# Patient Record
Sex: FEMALE | Race: WHITE | Employment: OTHER | URBAN - METROPOLITAN AREA
[De-identification: names, ages, dates, MRNs, and addresses within clinical notes are randomized per-mention and may not be internally consistent; named-entity substitution may affect disease eponyms.]

---

## 2017-03-25 ENCOUNTER — HOSPITAL ENCOUNTER (EMERGENCY)
Age: 77
Discharge: HOME | End: 2017-03-25
Attending: EMERGENCY MEDICINE
Payer: MEDICARE

## 2017-03-25 VITALS
HEART RATE: 101 BPM | RESPIRATION RATE: 19 BRPM | OXYGEN SATURATION: 95 % | DIASTOLIC BLOOD PRESSURE: 53 MMHG | TEMPERATURE: 98.1 F | HEIGHT: 57 IN | BODY MASS INDEX: 38.43 KG/M2 | WEIGHT: 178.13 LBS | SYSTOLIC BLOOD PRESSURE: 118 MMHG

## 2017-03-25 DIAGNOSIS — R53.83 FATIGUE, UNSPECIFIED TYPE: ICD-10-CM

## 2017-03-25 DIAGNOSIS — K64.8 INTERNAL HEMORRHOID: Primary | ICD-10-CM

## 2017-03-25 LAB
ABO + RH BLD: NORMAL
ALBUMIN SERPL BCP-MCNC: 3.5 G/DL (ref 3.5–5)
ALBUMIN/GLOB SERPL: 0.9 {RATIO} (ref 1.1–2.2)
ALP SERPL-CCNC: 93 U/L (ref 45–117)
ALT SERPL-CCNC: 51 U/L (ref 12–78)
ANION GAP BLD CALC-SCNC: 10 MMOL/L (ref 5–15)
ANTIGENS PRESENT BLD: NORMAL
APTT PPP: 22.8 SEC (ref 22.1–32.5)
AST SERPL W P-5'-P-CCNC: 37 U/L (ref 15–37)
BASOPHILS # BLD AUTO: 0.1 K/UL (ref 0–0.1)
BASOPHILS # BLD: 1 % (ref 0–1)
BILIRUB SERPL-MCNC: 0.2 MG/DL (ref 0.2–1)
BLOOD BANK CMNT PATIENT-IMP: NORMAL
BLOOD GROUP ANTIBODIES SERPL: NORMAL
BLOOD GROUP ANTIBODIES SERPL: NORMAL
BUN SERPL-MCNC: 20 MG/DL (ref 6–20)
BUN/CREAT SERPL: 24 (ref 12–20)
CALCIUM SERPL-MCNC: 9.6 MG/DL (ref 8.5–10.1)
CHLORIDE SERPL-SCNC: 107 MMOL/L (ref 97–108)
CO2 SERPL-SCNC: 26 MMOL/L (ref 21–32)
CREAT SERPL-MCNC: 0.85 MG/DL (ref 0.55–1.02)
EOSINOPHIL # BLD: 0.2 K/UL (ref 0–0.4)
EOSINOPHIL NFR BLD: 2 % (ref 0–7)
ERYTHROCYTE [DISTWIDTH] IN BLOOD BY AUTOMATED COUNT: 14.3 % (ref 11.5–14.5)
GLOBULIN SER CALC-MCNC: 3.7 G/DL (ref 2–4)
GLUCOSE SERPL-MCNC: 131 MG/DL (ref 65–100)
HCT VFR BLD AUTO: 36.7 % (ref 35–47)
HEMOCCULT STL QL: NEGATIVE
HGB BLD-MCNC: 11.6 G/DL (ref 11.5–16)
INR PPP: 1 (ref 0.9–1.1)
LYMPHOCYTES # BLD AUTO: 24 % (ref 12–49)
LYMPHOCYTES # BLD: 2.6 K/UL (ref 0.8–3.5)
MCH RBC QN AUTO: 28.9 PG (ref 26–34)
MCHC RBC AUTO-ENTMCNC: 31.6 G/DL (ref 30–36.5)
MCV RBC AUTO: 91.5 FL (ref 80–99)
MONOCYTES # BLD: 0.9 K/UL (ref 0–1)
MONOCYTES NFR BLD AUTO: 9 % (ref 5–13)
NEUTS SEG # BLD: 6.9 K/UL (ref 1.8–8)
NEUTS SEG NFR BLD AUTO: 64 % (ref 32–75)
PLATELET # BLD AUTO: 312 K/UL (ref 150–400)
POTASSIUM SERPL-SCNC: 4.4 MMOL/L (ref 3.5–5.1)
PROT SERPL-MCNC: 7.2 G/DL (ref 6.4–8.2)
PROTHROMBIN TIME: 10 SEC (ref 9–11.1)
RBC # BLD AUTO: 4.01 M/UL (ref 3.8–5.2)
SODIUM SERPL-SCNC: 143 MMOL/L (ref 136–145)
SPECIMEN EXP DATE BLD: NORMAL
THERAPEUTIC RANGE,PTTT: NORMAL SECS (ref 58–77)
WBC # BLD AUTO: 10.6 K/UL (ref 3.6–11)

## 2017-03-25 PROCEDURE — 82272 OCCULT BLD FECES 1-3 TESTS: CPT | Performed by: EMERGENCY MEDICINE

## 2017-03-25 PROCEDURE — 99284 EMERGENCY DEPT VISIT MOD MDM: CPT

## 2017-03-25 PROCEDURE — 80053 COMPREHEN METABOLIC PANEL: CPT | Performed by: PHYSICIAN ASSISTANT

## 2017-03-25 PROCEDURE — 85730 THROMBOPLASTIN TIME PARTIAL: CPT | Performed by: PHYSICIAN ASSISTANT

## 2017-03-25 PROCEDURE — 86900 BLOOD TYPING SEROLOGIC ABO: CPT | Performed by: PHYSICIAN ASSISTANT

## 2017-03-25 PROCEDURE — 36415 COLL VENOUS BLD VENIPUNCTURE: CPT | Performed by: PHYSICIAN ASSISTANT

## 2017-03-25 PROCEDURE — 85025 COMPLETE CBC W/AUTO DIFF WBC: CPT | Performed by: PHYSICIAN ASSISTANT

## 2017-03-25 PROCEDURE — 85610 PROTHROMBIN TIME: CPT | Performed by: PHYSICIAN ASSISTANT

## 2017-03-25 PROCEDURE — 86905 BLOOD TYPING RBC ANTIGENS: CPT | Performed by: PHYSICIAN ASSISTANT

## 2017-03-25 PROCEDURE — 86870 RBC ANTIBODY IDENTIFICATION: CPT | Performed by: PHYSICIAN ASSISTANT

## 2017-03-25 NOTE — DISCHARGE INSTRUCTIONS
We hope that we have addressed all of your medical concerns. The examination and treatment you received in the Emergency Department were for an emergent problem and were not intended as complete care. It is important that you follow up with your healthcare provider(s) for ongoing care. If your symptoms worsen or do not improve as expected, and you are unable to reach your usual health care provider(s), you should return to the Emergency Department. Today's healthcare is undergoing tremendous change, and patient satisfaction surveys are one of the many tools to assess the quality of medical care. You may receive a survey from the [employer] regarding your experience in the Emergency Department. I hope that your experience has been completely positive, particularly the medical care that I provided. As such, please participate in the survey; anything less than excellent does not meet my expectations or intentions. [facility] and [address] [place] [address] participate in nationally recognized quality of care measures. If your blood pressure is greater than 120/80, as reported below, we urge that you seek medical care to address the potential of high blood pressure, commonly known as hypertension. Hypertension can be hereditary or can be caused by certain medical conditions, pain, stress, or \"white coat syndrome. \"       Please make an appointment with your health care provider(s) for follow up of your Emergency Department visit. VITALS:   Patient Vitals for the past 8 hrs:   Temp Pulse Resp BP SpO2   03/25/17 1839 - - - 118/53 95 %   03/25/17 1810 98.1 °F (36.7 °C) (!) 101 19 136/65 95 %          Thank you for allowing us to provide you with medical care today. We realize that you have many choices for your emergency care needs. Please choose us in the future for any continued health care needs. Inna Roman, POOJA Mastersyecenia [facility]: [phone]            Recent Results (from the past 24 hour(s))   OCCULT BLOOD, STOOL    Collection Time: 03/25/17  6:46 PM   Result Value Ref Range    Occult blood, stool NEGATIVE  NEG     CBC WITH AUTOMATED DIFF    Collection Time: 03/25/17  6:59 PM   Result Value Ref Range    WBC 10.6 3.6 - 11.0 K/uL    RBC 4.01 3.80 - 5.20 M/uL    HGB 11.6 11.5 - 16.0 g/dL    HCT 36.7 35.0 - 47.0 %    MCV 91.5 80.0 - 99.0 FL    MCH 28.9 26.0 - 34.0 PG    MCHC 31.6 30.0 - 36.5 g/dL    RDW 14.3 11.5 - 14.5 %    PLATELET 885 358 - 678 K/uL    NEUTROPHILS 64 32 - 75 %    LYMPHOCYTES 24 12 - 49 %    MONOCYTES 9 5 - 13 %    EOSINOPHILS 2 0 - 7 %    BASOPHILS 1 0 - 1 %    ABS. NEUTROPHILS 6.9 1.8 - 8.0 K/UL    ABS. LYMPHOCYTES 2.6 0.8 - 3.5 K/UL    ABS. MONOCYTES 0.9 0.0 - 1.0 K/UL    ABS. EOSINOPHILS 0.2 0.0 - 0.4 K/UL    ABS. BASOPHILS 0.1 0.0 - 0.1 K/UL       No results found. Hemorrhoids: Care Instructions  Your Care Instructions    Hemorrhoids are enlarged veins that develop in the anal canal. Bleeding during bowel movements, itching, swelling, and rectal pain are the most common symptoms. They can be uncomfortable at times, but hemorrhoids rarely are a serious problem. You can treat most hemorrhoids with simple changes to your diet and bowel habits. These changes include eating more fiber and not straining to pass stools. Most hemorrhoids do not need surgery or other treatment unless they are very large and painful or bleed a lot. Follow-up care is a key part of your treatment and safety. Be sure to make and go to all appointments, and call your doctor if you are having problems. Its also a good idea to know your test results and keep a list of the medicines you take. How can you care for yourself at home? · Sit in a few inches of warm water (sitz bath) 3 times a day and after bowel movements. The warm water helps with pain and itching.   · Put ice on your anal area several times a day for 10 minutes at a time. Put a thin cloth between the ice and your skin. Follow this by placing a warm, wet towel on the area for another 10 to 20 minutes. · Take pain medicines exactly as directed. ¨ If the doctor gave you a prescription medicine for pain, take it as prescribed. ¨ If you are not taking a prescription pain medicine, ask your doctor if you can take an over-the-counter medicine. · Keep the anal area clean, but be gentle. Use water and a fragrance-free soap, such as [place], or use baby wipes or medicated pads, such as Tucks. · Wear cotton underwear and loose clothing to decrease moisture in the anal area. · Eat more fiber. Include foods such as whole-grain breads and cereals, raw vegetables, raw and dried fruits, and beans. · Drink plenty of fluids, enough so that your urine is light yellow or clear like water. If you have kidney, heart, or liver disease and have to limit fluids, talk with your doctor before you increase the amount of fluids you drink. · Use a stool softener that contains bran or psyllium. You can save money by buying bran or psyllium (available in bulk at most health food stores) and sprinkling it on foods or stirring it into fruit juice. Or you can use a product such as Metamucil or Hydrocil. · Practice healthy bowel habits. ¨ Go to the bathroom as soon as you have the urge. ¨ Avoid straining to pass stools. Relax and give yourself time to let things happen naturally. ¨ Do not hold your breath while passing stools. ¨ Do not read while sitting on the toilet. Get off the toilet as soon as you have finished. · Take your medicines exactly as prescribed. Call your doctor if you think you are having a problem with your medicine. When should you call for help? Call 911 anytime you think you may need emergency care. For example, call if:  · You pass maroon or very bloody stools.   Call your doctor now or seek immediate medical care if:  · You have increased pain. · You have increased bleeding. Watch closely for changes in your health, and be sure to contact your doctor if:  · Your symptoms have not improved after 3 or 4 days. Where can you learn more? Go to http://anabell-blake.info/. Enter F228 in the search box to learn more about \"Hemorrhoids: Care Instructions. \"  Current as of: August 9, 2016  Content Version: 11.1  © 0855-1556 [employer]. Care instructions adapted under license by [employer] (which disclaims liability or warranty for this information). If you have questions about a medical condition or this instruction, always ask your healthcare professional. [address] any warranty or liability for your use of this information. Fatigue: Care Instructions  Your Care Instructions  Fatigue is a feeling of tiredness, exhaustion, or lack of energy. You may feel fatigue because of too much or not enough activity. It can also come from stress, lack of sleep, boredom, and poor diet. Many medical problems, such as viral infections, can cause fatigue. Emotional problems, especially depression, are often the cause of fatigue. Fatigue is most often a symptom of another problem. Treatment for fatigue depends on the cause. For example, if you have fatigue because you have a certain health problem, treating this problem also treats your fatigue. If depression or anxiety is the cause, treatment may help. Follow-up care is a key part of your treatment and safety. Be sure to make and go to all appointments, and call your doctor if you are having problems. It's also a good idea to know your test results and keep a list of the medicines you take. How can you care for yourself at home? · Get regular exercise. But don't overdo it. Go back and forth between rest and exercise. · Get plenty of rest.  · Eat a healthy diet.  Do not skip meals, especially breakfast.  · Reduce your use of caffeine, tobacco, and alcohol. Caffeine is most often found in coffee, tea, cola drinks, and chocolate. · Limit medicines that can cause fatigue. This includes tranquilizers and cold and allergy medicines. When should you call for help? Watch closely for changes in your health, and be sure to contact your doctor if:  · You have new symptoms such as fever or a rash. · Your fatigue gets worse. · You have been feeling down, depressed, or hopeless. Or you may have lost interest in things that you usually enjoy. · You are not getting better as expected. Where can you learn more? Go to http://anabell-blake.info/. Enter R752 in the search box to learn more about \"Fatigue: Care Instructions. \"  Current as of: May 27, 2016  Content Version: 11.1  © 2429-4182 [employer]. Care instructions adapted under license by [employer] (which disclaims liability or warranty for this information). If you have questions about a medical condition or this instruction, always ask your healthcare professional. [place] [address] any warranty or liability for your use of this information.

## 2017-03-25 NOTE — ED NOTES
Bedside and Verbal shift change report given to april (oncoming nurse) by Ameena Kraft (offgoing nurse). Report included the following information SBAR.

## 2017-03-25 NOTE — ED PROVIDER NOTES
HPI Comments: Estrada Williamson is a 77 y.o. female with hx significant for internal hemorrhoids who presents ambulatory to ER with c/o general fatigue/malaise and rectal bleeding x one week. Pt states she has been having intermittent rectal bleeding x admitted in August for bleeding internal hemorrhoids and anemia/need for blood transfusion. States followed up with GI and \"they keep saying to use this cream which I am but it still bleeds\". States increased rectal bleeding x one week and states since increased bleeding started also having generalized fatigue and malaise johana thas been progressivel worsening. No SOB, syncope, abd pain, CP, and any other complaints. States \"I feel like I need a transfusion\". No other complaints. She specifically denies any fevers, chills, nausea, vomiting, chest pain, shortness of breath, headache, rash, diarrhea, abdominal pain, urinary changes, sweating or weight loss. PCP: Sascha Estes MD   PMHx significant for: Past Medical History:  No date: Hypercholesterolemia  2016: Hyperglycemia due to type 2 diabetes mellitus *  No date: Ill-defined condition      Comment: cellulitis  No date: Major depression      Comment: depression  No date: Pneumonia      Comment:  or so  No date: Transient ischemic attack    PSHx significant for: Past Surgical History:  2016: COLONOSCOPY      Comment:    2016: COLONOSCOPY N/A      Comment: COLONOSCOPY performed by Nicky Garcia MD                at [address] [place] [address] 10  2016: FLEXIBLE SIGMOIDOSCOPY N/A      Comment: SIGMOIDOSCOPY FLEXIBLE performed by Bradley Cisneros MD at [address] [place] [address] 10  No date: HX APPENDECTOMY  2016: UPPER GI ENDOSCOPY,DIAGNOSIS      Comment:       No Known Allergies    There are no other complaints, changes or physical findings at this time. The history is provided by the patient and a relative.         Past Medical History:   Diagnosis Date    Hypercholesterolemia     Hyperglycemia due to type 2 diabetes mellitus ([facility] [address] 8/4/2016    Ill-defined condition     cellulitis    Major depression     depression    Pneumonia     2011 or so    Transient ischemic attack        Past Surgical History:   Procedure Laterality Date    COLONOSCOPY  6/24/2016         COLONOSCOPY N/A 6/24/2016    COLONOSCOPY performed by Xioamra Koroma MD at [address] N/A 8/26/2016    SIGMOIDOSCOPY FLEXIBLE performed by Xiomara Koroma MD at [facility] HX APPENDECTOMY      UPPER GI ENDOSCOPY,DIAGNOSIS  8/26/2016              History reviewed. No pertinent family history. Social History     Social History    Marital status:      Spouse name: N/A    Number of children: N/A    Years of education: N/A     Occupational History    Not on file. Social History Main Topics    Smoking status: Former Smoker    Smokeless tobacco: Not on file    Alcohol use No    Drug use: No    Sexual activity: Not on file     Other Topics Concern    Not on file     Social History Narrative         ALLERGIES: Review of patient's allergies indicates no known allergies. Review of Systems   Constitutional: Positive for fatigue. Negative for appetite change, chills and fever. HENT: Negative. Negative for congestion and sore throat. Eyes: Negative. Negative for visual disturbance. Respiratory: Negative. Negative for cough and shortness of breath. Cardiovascular: Negative. Negative for chest pain, palpitations and leg swelling. Gastrointestinal: Positive for anal bleeding. Negative for abdominal pain, constipation, diarrhea, nausea and vomiting. Genitourinary: Negative. Negative for dysuria, flank pain and hematuria. Musculoskeletal: Negative. Negative for back pain and neck pain. Skin: Negative. Negative for rash. Neurological: Negative. Negative for dizziness, syncope, weakness, numbness and headaches. Hematological: Negative. Psychiatric/Behavioral: Negative. All other systems reviewed and are negative. Vitals:    03/25/17 1810 03/25/17 1839   BP: 136/65 118/53   Pulse: (!) 101    Resp: 19    Temp: 98.1 °F (36.7 °C)    SpO2: 95% 95%   Weight: 80.8 kg (178 lb 2.1 oz)    Height: 4' 9\" (1.448 m)             Physical Exam   Constitutional: She is oriented to person, place, and time. She appears well-developed and well-nourished. No distress. HENT:   Head: Normocephalic and atraumatic. Mouth/Throat: Oropharynx is clear and moist.   Eyes: Conjunctivae and EOM are normal. Pupils are equal, round, and reactive to light. Neck: Normal range of motion. Neck supple. Cardiovascular: Normal rate, S1 normal, S2 normal, normal heart sounds, intact distal pulses and normal pulses. Exam reveals no gallop and no friction rub. No murmur heard. Pulses:       Dorsalis pedis pulses are 2+ on the right side, and 2+ on the left side. Pulmonary/Chest: Effort normal and breath sounds normal. No accessory muscle usage. No respiratory distress. She has no decreased breath sounds. She has no wheezes. She has no rhonchi. She has no rales. Abdominal: Soft. Normal appearance and bowel sounds are normal. She exhibits no distension. There is no hepatosplenomegaly. There is no tenderness. There is no rebound, no guarding and no CVA tenderness. Genitourinary: Rectal exam shows guaiac negative stool (light brown stool; guaiac negative, contorl appropriate). Musculoskeletal: Normal range of motion. She exhibits no edema or tenderness. Neurological: She is alert and oriented to person, place, and time. She has normal strength. No sensory deficit. Skin: Skin is warm and dry. No rash noted. She is not diaphoretic. No erythema. No pallor. Psychiatric: She has a normal mood and affect. Her behavior is normal.   Nursing note and vitals reviewed. MDM  Number of Diagnoses or Management Options  Fatigue, unspecified type:    Internal hemorrhoid:   Diagnosis management comments: DDx: rectal bleeding, GI bleed, anemia       Amount and/or Complexity of Data Reviewed  Clinical lab tests: ordered and reviewed  Obtain history from someone other than the patient: yes (Daughter )  Review and summarize past medical records: yes  Discuss the patient with other providers: yes (Dr. Clyde Bach)    Patient Progress  Patient progress: stable    ED Course       Procedures        Procedure Note - Rectal Exam:   6:49 PM  Performed by: Cleo Cruz PA-C   Chaperoned by: Gokul Benitez RN  Rectal exam performed. Light brown stool was collected. Stool was Hemoccult tested, and found to be heme Negative. The procedure took 1-15 minutes, and pt tolerated well. 6:49 PM   Cleo Cruz PA-C discussed patient with Dannielle Childress MD who is in agreement with care plan as outlined. Will be in to see the patient. No further recommendations. Cleo Cruz PA-C          7:30 PM  Pt has been reevaluated. There are no new complaints, changes, or physical findings at this time. Medications have been reviewed w/ pt and/or family. Pt and/or family's questions have been answered. Pt and/or family expressed good understanding of the dx/tx/rx and is in agreement with plan of care. Pt instructed and agreed to f/u w/ PCP, GI and to return to ED upon further deterioration. Pt is ready for discharge.     LABORATORY TESTS:  Recent Results (from the past 12 hour(s))   OCCULT BLOOD, STOOL    Collection Time: 03/25/17  6:46 PM   Result Value Ref Range    Occult blood, stool NEGATIVE  NEG     CBC WITH AUTOMATED DIFF    Collection Time: 03/25/17  6:59 PM   Result Value Ref Range    WBC 10.6 3.6 - 11.0 K/uL    RBC 4.01 3.80 - 5.20 M/uL    HGB 11.6 11.5 - 16.0 g/dL    HCT 36.7 35.0 - 47.0 %    MCV 91.5 80.0 - 99.0 FL    MCH 28.9 26.0 - 34.0 PG    MCHC 31.6 30.0 - 36.5 g/dL    RDW 14.3 11.5 - 14.5 %    PLATELET 789 387 - 503 K/uL    NEUTROPHILS 64 32 - 75 %    LYMPHOCYTES 24 12 - 49 %    MONOCYTES 9 5 - 13 %    EOSINOPHILS 2 0 - 7 %    BASOPHILS 1 0 - 1 %    ABS. NEUTROPHILS 6.9 1.8 - 8.0 K/UL    ABS. LYMPHOCYTES 2.6 0.8 - 3.5 K/UL    ABS. MONOCYTES 0.9 0.0 - 1.0 K/UL    ABS. EOSINOPHILS 0.2 0.0 - 0.4 K/UL    ABS. BASOPHILS 0.1 0.0 - 0.1 K/UL       IMAGING RESULTS:  No orders to display     No results found. MEDICATIONS GIVEN:  Medications - No data to display    IMPRESSION:  1. Internal hemorrhoid    2. Fatigue, unspecified type        PLAN:  1. Current Discharge Medication List      CONTINUE these medications which have NOT CHANGED    Details   hydrocortisone (ANUSOL-HC) 2.5 % rectal cream Insert  into rectum four (4) times daily. Qty: 30 g, Refills: 0      fluticasone-vilanterol (BREO ELLIPTA) 100-25 mcg/dose inhaler Take 1 Puff by inhalation daily. benzonatate (TESSALON) 100 mg capsule Take 100 mg by mouth two (2) times daily as needed for Cough. ferrous sulfate 325 mg (65 mg iron) tablet Take 1 Tab by mouth daily (with breakfast). Qty: 30 Tab, Refills: 1      omega-3 acid ethyl esters (LOVAZA) 1 gram capsule Take 4 g by mouth daily (with breakfast). aspirin delayed-release 81 mg tablet Take 81 mg by mouth daily. atorvastatin (LIPITOR) 40 mg tablet Take 40 mg by mouth daily. escitalopram oxalate (LEXAPRO) 20 mg tablet Take 20 mg by mouth daily. metFORMIN (GLUCOPHAGE) 500 mg tablet Take 500 mg by mouth two (2) times daily (with meals). losartan (COZAAR) 25 mg tablet Take 25 mg by mouth daily. cholecalciferol (VITAMIN D3) 400 unit tab tablet Take 400 Units by mouth two (2) times a day. calcium carbonate (OS-DEMARCUS) 500 mg calcium (1,250 mg) tablet Take 2 Tabs by mouth daily.            2.   Follow-up Information     Follow up With Details Comments Contact Info    Lilia Nova MD Schedule an appointment as soon as possible for a visit in 3 days  [address]      Chaparro Streeter MD Schedule an appointment as soon as possible for a visit in 3 days  [facility] [id number]  [phone]      [address] [facility] DEPT  If symptoms worsen [facility]  [phone]            Return to ED if worse

## 2017-11-27 ENCOUNTER — APPOINTMENT (OUTPATIENT)
Dept: GENERAL RADIOLOGY | Age: 77
DRG: 549 | End: 2017-11-27
Attending: EMERGENCY MEDICINE
Payer: MEDICARE

## 2017-11-27 ENCOUNTER — APPOINTMENT (OUTPATIENT)
Dept: CT IMAGING | Age: 77
DRG: 549 | End: 2017-11-27
Attending: EMERGENCY MEDICINE
Payer: MEDICARE

## 2017-11-27 ENCOUNTER — ANESTHESIA (OUTPATIENT)
Dept: SURGERY | Age: 77
DRG: 549 | End: 2017-11-27
Payer: MEDICARE

## 2017-11-27 ENCOUNTER — HOSPITAL ENCOUNTER (INPATIENT)
Age: 77
LOS: 4 days | Discharge: HOME HEALTH | DRG: 549 | End: 2017-12-01
Attending: EMERGENCY MEDICINE | Admitting: INTERNAL MEDICINE
Payer: MEDICARE

## 2017-11-27 ENCOUNTER — ANESTHESIA EVENT (OUTPATIENT)
Dept: SURGERY | Age: 77
DRG: 549 | End: 2017-11-27
Payer: MEDICARE

## 2017-11-27 DIAGNOSIS — A41.9 SEPSIS, DUE TO UNSPECIFIED ORGANISM: Primary | ICD-10-CM

## 2017-11-27 DIAGNOSIS — D72.9 NEUTROPHILIA: ICD-10-CM

## 2017-11-27 PROBLEM — E66.01 OBESITY, MORBID (HCC): Status: ACTIVE | Noted: 2017-11-27

## 2017-11-27 PROBLEM — E87.20 LACTIC ACIDOSIS: Status: ACTIVE | Noted: 2017-11-27

## 2017-11-27 LAB
ALBUMIN SERPL-MCNC: 3 G/DL (ref 3.5–5)
ALBUMIN/GLOB SERPL: 0.7 {RATIO} (ref 1.1–2.2)
ALP SERPL-CCNC: 103 U/L (ref 45–117)
ALT SERPL-CCNC: 39 U/L (ref 12–78)
ANION GAP SERPL CALC-SCNC: 11 MMOL/L (ref 5–15)
APPEARANCE FLD: ABNORMAL
APPEARANCE UR: CLEAR
AST SERPL-CCNC: 26 U/L (ref 15–37)
ATRIAL RATE: 97 BPM
BACTERIA URNS QL MICRO: NEGATIVE /HPF
BASOPHILS # BLD: 0 K/UL (ref 0–0.1)
BASOPHILS NFR BLD: 0 % (ref 0–1)
BILIRUB SERPL-MCNC: 0.5 MG/DL (ref 0.2–1)
BILIRUB UR QL: NEGATIVE
BNP SERPL-MCNC: 257 PG/ML (ref 0–450)
BODY FLD TYPE: NORMAL
BUN SERPL-MCNC: 13 MG/DL (ref 6–20)
BUN/CREAT SERPL: 15 (ref 12–20)
CALCIUM SERPL-MCNC: 9 MG/DL (ref 8.5–10.1)
CALCULATED R AXIS, ECG10: -158 DEGREES
CALCULATED T AXIS, ECG11: 134 DEGREES
CHLORIDE SERPL-SCNC: 101 MMOL/L (ref 97–108)
CO2 SERPL-SCNC: 26 MMOL/L (ref 21–32)
COLOR FLD: ABNORMAL
COLOR UR: ABNORMAL
CREAT SERPL-MCNC: 0.87 MG/DL (ref 0.55–1.02)
CRP SERPL-MCNC: 15.05 MG/DL
CRYSTALS FLD MICRO: NORMAL
DIAGNOSIS, 93000: NORMAL
DIFFERENTIAL METHOD BLD: ABNORMAL
EOSINOPHIL # BLD: 0 K/UL (ref 0–0.4)
EOSINOPHIL NFR BLD: 0 % (ref 0–7)
EPITH CASTS URNS QL MICRO: ABNORMAL /LPF
ERYTHROCYTE [DISTWIDTH] IN BLOOD BY AUTOMATED COUNT: 16.6 % (ref 11.5–14.5)
ERYTHROCYTE [SEDIMENTATION RATE] IN BLOOD: 68 MM/HR (ref 0–30)
EST. AVERAGE GLUCOSE BLD GHB EST-MCNC: 180 MG/DL
GLOBULIN SER CALC-MCNC: 4.4 G/DL (ref 2–4)
GLUCOSE BLD STRIP.AUTO-MCNC: 126 MG/DL (ref 65–100)
GLUCOSE BLD STRIP.AUTO-MCNC: 129 MG/DL (ref 65–100)
GLUCOSE BLD STRIP.AUTO-MCNC: 193 MG/DL (ref 65–100)
GLUCOSE BLD STRIP.AUTO-MCNC: 225 MG/DL (ref 65–100)
GLUCOSE SERPL-MCNC: 221 MG/DL (ref 65–100)
GLUCOSE UR STRIP.AUTO-MCNC: NEGATIVE MG/DL
GRAN CASTS URNS QL MICRO: ABNORMAL /LPF
HBA1C MFR BLD: 7.9 % (ref 4.2–6.3)
HCT VFR BLD AUTO: 34.6 % (ref 35–47)
HGB BLD-MCNC: 10.8 G/DL (ref 11.5–16)
HGB UR QL STRIP: NEGATIVE
KETONES UR QL STRIP.AUTO: NEGATIVE MG/DL
LACTATE SERPL-SCNC: 1.7 MMOL/L (ref 0.4–2)
LACTATE SERPL-SCNC: 2.7 MMOL/L (ref 0.4–2)
LEUKOCYTE ESTERASE UR QL STRIP.AUTO: NEGATIVE
LYMPHOCYTES # BLD: 1.6 K/UL (ref 0.8–3.5)
LYMPHOCYTES NFR BLD: 6 % (ref 12–49)
LYMPHOCYTES NFR FLD: 2 %
MCH RBC QN AUTO: 28.1 PG (ref 26–34)
MCHC RBC AUTO-ENTMCNC: 31.2 G/DL (ref 30–36.5)
MCV RBC AUTO: 90.1 FL (ref 80–99)
MONOCYTES # BLD: 1.9 K/UL (ref 0–1)
MONOCYTES NFR BLD: 7 % (ref 5–13)
MONOS+MACROS NFR FLD: 4 %
NEUTROPHILS NFR FLD: 94 %
NEUTS SEG # BLD: 23.4 K/UL (ref 1.8–8)
NEUTS SEG NFR BLD: 87 % (ref 32–75)
NITRITE UR QL STRIP.AUTO: NEGATIVE
NUC CELL # FLD: ABNORMAL /CU MM (ref 0–5)
P-R INTERVAL, ECG05: 166 MS
PERIPHERAL SMEAR,PSM: NORMAL
PH UR STRIP: 5.5 [PH] (ref 5–8)
PLATELET # BLD AUTO: 333 K/UL (ref 150–400)
POTASSIUM SERPL-SCNC: 3.9 MMOL/L (ref 3.5–5.1)
PROT SERPL-MCNC: 7.4 G/DL (ref 6.4–8.2)
PROT UR STRIP-MCNC: NEGATIVE MG/DL
Q-T INTERVAL, ECG07: 350 MS
QRS DURATION, ECG06: 76 MS
QTC CALCULATION (BEZET), ECG08: 444 MS
RBC # BLD AUTO: 3.84 M/UL (ref 3.8–5.2)
RBC # FLD: >100 /CU MM
RBC #/AREA URNS HPF: ABNORMAL /HPF (ref 0–5)
RBC MORPH BLD: ABNORMAL
SERVICE CMNT-IMP: ABNORMAL
SODIUM SERPL-SCNC: 138 MMOL/L (ref 136–145)
SP GR UR REFRACTOMETRY: 1.01 (ref 1–1.03)
SPECIMEN SOURCE FLD: ABNORMAL
TROPONIN I SERPL-MCNC: <0.04 NG/ML
UA: UC IF INDICATED,UAUC: ABNORMAL
UROBILINOGEN UR QL STRIP.AUTO: 1 EU/DL (ref 0.2–1)
VENTRICULAR RATE, ECG03: 97 BPM
WBC # BLD AUTO: 26.9 K/UL (ref 3.6–11)
WBC URNS QL MICRO: ABNORMAL /HPF (ref 0–4)

## 2017-11-27 PROCEDURE — 70491 CT SOFT TISSUE NECK W/DYE: CPT

## 2017-11-27 PROCEDURE — 89060 EXAM SYNOVIAL FLUID CRYSTALS: CPT | Performed by: NURSE PRACTITIONER

## 2017-11-27 PROCEDURE — 74011250636 HC RX REV CODE- 250/636

## 2017-11-27 PROCEDURE — 77030010816: Performed by: ORTHOPAEDIC SURGERY

## 2017-11-27 PROCEDURE — 74011250636 HC RX REV CODE- 250/636: Performed by: EMERGENCY MEDICINE

## 2017-11-27 PROCEDURE — 74011000250 HC RX REV CODE- 250: Performed by: ANESTHESIOLOGY

## 2017-11-27 PROCEDURE — 74011250637 HC RX REV CODE- 250/637: Performed by: INTERNAL MEDICINE

## 2017-11-27 PROCEDURE — 76010000153 HC OR TIME 1.5 TO 2 HR: Performed by: ORTHOPAEDIC SURGERY

## 2017-11-27 PROCEDURE — 96375 TX/PRO/DX INJ NEW DRUG ADDON: CPT

## 2017-11-27 PROCEDURE — 77030026438 HC STYL ET INTUB CARD -A: Performed by: ANESTHESIOLOGY

## 2017-11-27 PROCEDURE — 83605 ASSAY OF LACTIC ACID: CPT | Performed by: EMERGENCY MEDICINE

## 2017-11-27 PROCEDURE — 74011000250 HC RX REV CODE- 250

## 2017-11-27 PROCEDURE — 74011000258 HC RX REV CODE- 258: Performed by: EMERGENCY MEDICINE

## 2017-11-27 PROCEDURE — 71260 CT THORAX DX C+: CPT

## 2017-11-27 PROCEDURE — 83036 HEMOGLOBIN GLYCOSYLATED A1C: CPT | Performed by: INTERNAL MEDICINE

## 2017-11-27 PROCEDURE — 65270000029 HC RM PRIVATE

## 2017-11-27 PROCEDURE — 77030013234 HC TRACT SHLDR KT BIOM -B: Performed by: ORTHOPAEDIC SURGERY

## 2017-11-27 PROCEDURE — 99285 EMERGENCY DEPT VISIT HI MDM: CPT

## 2017-11-27 PROCEDURE — 81001 URINALYSIS AUTO W/SCOPE: CPT | Performed by: EMERGENCY MEDICINE

## 2017-11-27 PROCEDURE — 77030011930 HC WND ARTHRO ABLT S&N -C: Performed by: ORTHOPAEDIC SURGERY

## 2017-11-27 PROCEDURE — 74011250637 HC RX REV CODE- 250/637

## 2017-11-27 PROCEDURE — 93005 ELECTROCARDIOGRAM TRACING: CPT

## 2017-11-27 PROCEDURE — 74011636320 HC RX REV CODE- 636/320: Performed by: RADIOLOGY

## 2017-11-27 PROCEDURE — 73030 X-RAY EXAM OF SHOULDER: CPT

## 2017-11-27 PROCEDURE — 76060000034 HC ANESTHESIA 1.5 TO 2 HR: Performed by: ORTHOPAEDIC SURGERY

## 2017-11-27 PROCEDURE — 77030018835 HC SOL IRR LR ICUM -A: Performed by: ORTHOPAEDIC SURGERY

## 2017-11-27 PROCEDURE — 77030008496 HC TBNG ARTHSC IRR S&N -B: Performed by: ORTHOPAEDIC SURGERY

## 2017-11-27 PROCEDURE — 96365 THER/PROPH/DIAG IV INF INIT: CPT

## 2017-11-27 PROCEDURE — 77030002916 HC SUT ETHLN J&J -A: Performed by: ORTHOPAEDIC SURGERY

## 2017-11-27 PROCEDURE — 87040 BLOOD CULTURE FOR BACTERIA: CPT | Performed by: EMERGENCY MEDICINE

## 2017-11-27 PROCEDURE — 36415 COLL VENOUS BLD VENIPUNCTURE: CPT | Performed by: EMERGENCY MEDICINE

## 2017-11-27 PROCEDURE — 74011250636 HC RX REV CODE- 250/636: Performed by: INTERNAL MEDICINE

## 2017-11-27 PROCEDURE — 86140 C-REACTIVE PROTEIN: CPT | Performed by: INTERNAL MEDICINE

## 2017-11-27 PROCEDURE — 89050 BODY FLUID CELL COUNT: CPT | Performed by: NURSE PRACTITIONER

## 2017-11-27 PROCEDURE — 80053 COMPREHEN METABOLIC PANEL: CPT | Performed by: EMERGENCY MEDICINE

## 2017-11-27 PROCEDURE — 82962 GLUCOSE BLOOD TEST: CPT

## 2017-11-27 PROCEDURE — 74011000272 HC RX REV CODE- 272: Performed by: ORTHOPAEDIC SURGERY

## 2017-11-27 PROCEDURE — 74011000250 HC RX REV CODE- 250: Performed by: ORTHOPAEDIC SURGERY

## 2017-11-27 PROCEDURE — 83880 ASSAY OF NATRIURETIC PEPTIDE: CPT | Performed by: INTERNAL MEDICINE

## 2017-11-27 PROCEDURE — 74011636637 HC RX REV CODE- 636/637: Performed by: INTERNAL MEDICINE

## 2017-11-27 PROCEDURE — 76210000017 HC OR PH I REC 1.5 TO 2 HR: Performed by: ORTHOPAEDIC SURGERY

## 2017-11-27 PROCEDURE — 84484 ASSAY OF TROPONIN QUANT: CPT | Performed by: EMERGENCY MEDICINE

## 2017-11-27 PROCEDURE — 85025 COMPLETE CBC W/AUTO DIFF WBC: CPT | Performed by: EMERGENCY MEDICINE

## 2017-11-27 PROCEDURE — 77030020346 HC PRB ARTHSC CHSL S&N -C: Performed by: ORTHOPAEDIC SURGERY

## 2017-11-27 PROCEDURE — 0R9K4ZZ DRAINAGE OF LEFT SHOULDER JOINT, PERCUTANEOUS ENDOSCOPIC APPROACH: ICD-10-PCS | Performed by: ORTHOPAEDIC SURGERY

## 2017-11-27 PROCEDURE — 77030008684 HC TU ET CUF COVD -B: Performed by: ANESTHESIOLOGY

## 2017-11-27 PROCEDURE — 85652 RBC SED RATE AUTOMATED: CPT | Performed by: INTERNAL MEDICINE

## 2017-11-27 PROCEDURE — 77030019974 HC FRCP GRSP SUT J&J -C: Performed by: ORTHOPAEDIC SURGERY

## 2017-11-27 PROCEDURE — 87070 CULTURE OTHR SPECIMN AEROBIC: CPT | Performed by: NURSE PRACTITIONER

## 2017-11-27 RX ORDER — KETOROLAC TROMETHAMINE 30 MG/ML
15 INJECTION, SOLUTION INTRAMUSCULAR; INTRAVENOUS
Status: COMPLETED | OUTPATIENT
Start: 2017-11-27 | End: 2017-11-27

## 2017-11-27 RX ORDER — GUAIFENESIN 600 MG/1
600 TABLET, EXTENDED RELEASE ORAL EVERY 12 HOURS
Status: DISCONTINUED | OUTPATIENT
Start: 2017-11-27 | End: 2017-12-01 | Stop reason: HOSPADM

## 2017-11-27 RX ORDER — SODIUM CHLORIDE 0.9 % (FLUSH) 0.9 %
5-10 SYRINGE (ML) INJECTION AS NEEDED
Status: DISCONTINUED | OUTPATIENT
Start: 2017-11-27 | End: 2017-11-28 | Stop reason: HOSPADM

## 2017-11-27 RX ORDER — LEVOFLOXACIN 5 MG/ML
750 INJECTION, SOLUTION INTRAVENOUS
Status: COMPLETED | OUTPATIENT
Start: 2017-11-27 | End: 2017-11-27

## 2017-11-27 RX ORDER — ENOXAPARIN SODIUM 100 MG/ML
40 INJECTION SUBCUTANEOUS EVERY 12 HOURS
Status: DISCONTINUED | OUTPATIENT
Start: 2017-11-27 | End: 2017-12-01 | Stop reason: HOSPADM

## 2017-11-27 RX ORDER — SODIUM CHLORIDE 0.9 % (FLUSH) 0.9 %
5-10 SYRINGE (ML) INJECTION EVERY 8 HOURS
Status: DISCONTINUED | OUTPATIENT
Start: 2017-11-27 | End: 2017-12-01 | Stop reason: HOSPADM

## 2017-11-27 RX ORDER — NEOSTIGMINE METHYLSULFATE 1 MG/ML
INJECTION INTRAVENOUS AS NEEDED
Status: DISCONTINUED | OUTPATIENT
Start: 2017-11-27 | End: 2017-11-27 | Stop reason: HOSPADM

## 2017-11-27 RX ORDER — DEXTROSE 50 % IN WATER (D50W) INTRAVENOUS SYRINGE
12.5-25 AS NEEDED
Status: DISCONTINUED | OUTPATIENT
Start: 2017-11-27 | End: 2017-12-01 | Stop reason: HOSPADM

## 2017-11-27 RX ORDER — ONDANSETRON 2 MG/ML
4 INJECTION INTRAMUSCULAR; INTRAVENOUS AS NEEDED
Status: DISCONTINUED | OUTPATIENT
Start: 2017-11-27 | End: 2017-11-28 | Stop reason: HOSPADM

## 2017-11-27 RX ORDER — SODIUM CHLORIDE, SODIUM LACTATE, POTASSIUM CHLORIDE, CALCIUM CHLORIDE 600; 310; 30; 20 MG/100ML; MG/100ML; MG/100ML; MG/100ML
125 INJECTION, SOLUTION INTRAVENOUS CONTINUOUS
Status: DISCONTINUED | OUTPATIENT
Start: 2017-11-28 | End: 2017-11-28 | Stop reason: HOSPADM

## 2017-11-27 RX ORDER — SODIUM CHLORIDE 9 MG/ML
100 INJECTION, SOLUTION INTRAVENOUS CONTINUOUS
Status: DISPENSED | OUTPATIENT
Start: 2017-11-27 | End: 2017-11-28

## 2017-11-27 RX ORDER — ESMOLOL HYDROCHLORIDE 10 MG/ML
INJECTION INTRAVENOUS AS NEEDED
Status: DISCONTINUED | OUTPATIENT
Start: 2017-11-27 | End: 2017-11-27 | Stop reason: HOSPADM

## 2017-11-27 RX ORDER — HYDROMORPHONE HYDROCHLORIDE 1 MG/ML
.25-1 INJECTION, SOLUTION INTRAMUSCULAR; INTRAVENOUS; SUBCUTANEOUS
Status: DISCONTINUED | OUTPATIENT
Start: 2017-11-27 | End: 2017-11-28 | Stop reason: HOSPADM

## 2017-11-27 RX ORDER — FENTANYL CITRATE 50 UG/ML
INJECTION, SOLUTION INTRAMUSCULAR; INTRAVENOUS AS NEEDED
Status: DISCONTINUED | OUTPATIENT
Start: 2017-11-27 | End: 2017-11-27 | Stop reason: HOSPADM

## 2017-11-27 RX ORDER — MAGNESIUM SULFATE 100 %
4 CRYSTALS MISCELLANEOUS AS NEEDED
Status: DISCONTINUED | OUTPATIENT
Start: 2017-11-27 | End: 2017-12-01 | Stop reason: HOSPADM

## 2017-11-27 RX ORDER — ONDANSETRON 2 MG/ML
4 INJECTION INTRAMUSCULAR; INTRAVENOUS
Status: DISCONTINUED | OUTPATIENT
Start: 2017-11-27 | End: 2017-12-01 | Stop reason: HOSPADM

## 2017-11-27 RX ORDER — EPINEPHRINE 1 MG/ML
INJECTION INTRAMUSCULAR; INTRAVENOUS; SUBCUTANEOUS AS NEEDED
Status: DISCONTINUED | OUTPATIENT
Start: 2017-11-27 | End: 2017-11-27 | Stop reason: HOSPADM

## 2017-11-27 RX ORDER — LIDOCAINE HYDROCHLORIDE 20 MG/ML
INJECTION, SOLUTION EPIDURAL; INFILTRATION; INTRACAUDAL; PERINEURAL AS NEEDED
Status: DISCONTINUED | OUTPATIENT
Start: 2017-11-27 | End: 2017-11-27 | Stop reason: HOSPADM

## 2017-11-27 RX ORDER — ATORVASTATIN CALCIUM 10 MG/1
40 TABLET, FILM COATED ORAL DAILY
Status: DISCONTINUED | OUTPATIENT
Start: 2017-11-28 | End: 2017-12-01 | Stop reason: HOSPADM

## 2017-11-27 RX ORDER — ESCITALOPRAM OXALATE 10 MG/1
20 TABLET ORAL DAILY
Status: DISCONTINUED | OUTPATIENT
Start: 2017-11-28 | End: 2017-12-01 | Stop reason: HOSPADM

## 2017-11-27 RX ORDER — SODIUM CHLORIDE 0.9 % (FLUSH) 0.9 %
5-10 SYRINGE (ML) INJECTION EVERY 8 HOURS
Status: CANCELLED | OUTPATIENT
Start: 2017-11-28

## 2017-11-27 RX ORDER — GLYCOPYRROLATE 0.2 MG/ML
INJECTION INTRAMUSCULAR; INTRAVENOUS AS NEEDED
Status: DISCONTINUED | OUTPATIENT
Start: 2017-11-27 | End: 2017-11-27 | Stop reason: HOSPADM

## 2017-11-27 RX ORDER — OMEGA-3-ACID ETHYL ESTERS 1 G/1
4 CAPSULE, LIQUID FILLED ORAL
COMMUNITY

## 2017-11-27 RX ORDER — LANOLIN ALCOHOL/MO/W.PET/CERES
325 CREAM (GRAM) TOPICAL
Status: DISCONTINUED | OUTPATIENT
Start: 2017-11-28 | End: 2017-12-01 | Stop reason: HOSPADM

## 2017-11-27 RX ORDER — MIDAZOLAM HYDROCHLORIDE 1 MG/ML
INJECTION, SOLUTION INTRAMUSCULAR; INTRAVENOUS AS NEEDED
Status: DISCONTINUED | OUTPATIENT
Start: 2017-11-27 | End: 2017-11-27 | Stop reason: HOSPADM

## 2017-11-27 RX ORDER — SUCCINYLCHOLINE CHLORIDE 20 MG/ML
INJECTION INTRAMUSCULAR; INTRAVENOUS AS NEEDED
Status: DISCONTINUED | OUTPATIENT
Start: 2017-11-27 | End: 2017-11-27 | Stop reason: HOSPADM

## 2017-11-27 RX ORDER — OMEGA-3-ACID ETHYL ESTERS 1 G/1
4 CAPSULE, LIQUID FILLED ORAL
Status: DISCONTINUED | OUTPATIENT
Start: 2017-11-28 | End: 2017-12-01 | Stop reason: HOSPADM

## 2017-11-27 RX ORDER — ROCURONIUM BROMIDE 10 MG/ML
INJECTION, SOLUTION INTRAVENOUS AS NEEDED
Status: DISCONTINUED | OUTPATIENT
Start: 2017-11-27 | End: 2017-11-27 | Stop reason: HOSPADM

## 2017-11-27 RX ORDER — ALBUTEROL SULFATE 90 UG/1
AEROSOL, METERED RESPIRATORY (INHALATION) AS NEEDED
Status: DISCONTINUED | OUTPATIENT
Start: 2017-11-27 | End: 2017-11-27 | Stop reason: HOSPADM

## 2017-11-27 RX ORDER — ACETAMINOPHEN 325 MG/1
650 TABLET ORAL
Status: DISCONTINUED | OUTPATIENT
Start: 2017-11-27 | End: 2017-12-01 | Stop reason: HOSPADM

## 2017-11-27 RX ORDER — SODIUM CHLORIDE 0.9 % (FLUSH) 0.9 %
5-10 SYRINGE (ML) INJECTION AS NEEDED
Status: DISCONTINUED | OUTPATIENT
Start: 2017-11-27 | End: 2017-12-01 | Stop reason: HOSPADM

## 2017-11-27 RX ORDER — DIPHENHYDRAMINE HYDROCHLORIDE 50 MG/ML
12.5 INJECTION, SOLUTION INTRAMUSCULAR; INTRAVENOUS AS NEEDED
Status: ACTIVE | OUTPATIENT
Start: 2017-11-27 | End: 2017-11-27

## 2017-11-27 RX ORDER — SODIUM CHLORIDE, SODIUM LACTATE, POTASSIUM CHLORIDE, CALCIUM CHLORIDE 600; 310; 30; 20 MG/100ML; MG/100ML; MG/100ML; MG/100ML
INJECTION, SOLUTION INTRAVENOUS
Status: DISCONTINUED | OUTPATIENT
Start: 2017-11-27 | End: 2017-11-27 | Stop reason: HOSPADM

## 2017-11-27 RX ORDER — PROPOFOL 10 MG/ML
INJECTION, EMULSION INTRAVENOUS AS NEEDED
Status: DISCONTINUED | OUTPATIENT
Start: 2017-11-27 | End: 2017-11-27 | Stop reason: HOSPADM

## 2017-11-27 RX ORDER — SODIUM CHLORIDE 0.9 % (FLUSH) 0.9 %
5-10 SYRINGE (ML) INJECTION AS NEEDED
Status: CANCELLED | OUTPATIENT
Start: 2017-11-27

## 2017-11-27 RX ORDER — GLUCOSAM/CHONDRO/HERB 149/HYAL 750-100 MG
4 TABLET ORAL DAILY
COMMUNITY
End: 2017-11-27

## 2017-11-27 RX ORDER — LIDOCAINE HYDROCHLORIDE 10 MG/ML
0.1 INJECTION, SOLUTION EPIDURAL; INFILTRATION; INTRACAUDAL; PERINEURAL AS NEEDED
Status: CANCELLED | OUTPATIENT
Start: 2017-11-27

## 2017-11-27 RX ORDER — ALBUTEROL SULFATE 0.83 MG/ML
2.5 SOLUTION RESPIRATORY (INHALATION) ONCE
Status: COMPLETED | OUTPATIENT
Start: 2017-11-27 | End: 2017-11-27

## 2017-11-27 RX ORDER — ASPIRIN 81 MG/1
81 TABLET ORAL DAILY
Status: DISCONTINUED | OUTPATIENT
Start: 2017-11-28 | End: 2017-12-01 | Stop reason: HOSPADM

## 2017-11-27 RX ORDER — INSULIN LISPRO 100 [IU]/ML
INJECTION, SOLUTION INTRAVENOUS; SUBCUTANEOUS
Status: DISCONTINUED | OUTPATIENT
Start: 2017-11-27 | End: 2017-12-01 | Stop reason: HOSPADM

## 2017-11-27 RX ORDER — NALOXONE HYDROCHLORIDE 0.4 MG/ML
0.4 INJECTION, SOLUTION INTRAMUSCULAR; INTRAVENOUS; SUBCUTANEOUS AS NEEDED
Status: DISCONTINUED | OUTPATIENT
Start: 2017-11-27 | End: 2017-12-01 | Stop reason: HOSPADM

## 2017-11-27 RX ADMIN — ONDANSETRON 4 MG: 2 INJECTION INTRAMUSCULAR; INTRAVENOUS at 20:13

## 2017-11-27 RX ADMIN — ALBUTEROL SULFATE 3 PUFF: 90 AEROSOL, METERED RESPIRATORY (INHALATION) at 23:10

## 2017-11-27 RX ADMIN — SODIUM CHLORIDE 100 ML/HR: 900 INJECTION, SOLUTION INTRAVENOUS at 15:30

## 2017-11-27 RX ADMIN — ESMOLOL HYDROCHLORIDE 15 MG: 10 INJECTION INTRAVENOUS at 23:20

## 2017-11-27 RX ADMIN — LEVOFLOXACIN 750 MG: 5 INJECTION, SOLUTION INTRAVENOUS at 12:32

## 2017-11-27 RX ADMIN — FENTANYL CITRATE 50 MCG: 50 INJECTION, SOLUTION INTRAMUSCULAR; INTRAVENOUS at 22:04

## 2017-11-27 RX ADMIN — SODIUM CHLORIDE, SODIUM LACTATE, POTASSIUM CHLORIDE, CALCIUM CHLORIDE: 600; 310; 30; 20 INJECTION, SOLUTION INTRAVENOUS at 23:00

## 2017-11-27 RX ADMIN — Medication 10 ML: at 15:30

## 2017-11-27 RX ADMIN — EPINEPHRINE 0.3 MG: 1 INJECTION INTRAMUSCULAR; INTRAVENOUS; SUBCUTANEOUS at 23:20

## 2017-11-27 RX ADMIN — ROCURONIUM BROMIDE 5 MG: 10 INJECTION, SOLUTION INTRAVENOUS at 22:26

## 2017-11-27 RX ADMIN — ESMOLOL HYDROCHLORIDE 20 MG: 10 INJECTION INTRAVENOUS at 23:40

## 2017-11-27 RX ADMIN — SODIUM CHLORIDE, SODIUM LACTATE, POTASSIUM CHLORIDE, CALCIUM CHLORIDE: 600; 310; 30; 20 INJECTION, SOLUTION INTRAVENOUS at 21:57

## 2017-11-27 RX ADMIN — GUAIFENESIN 600 MG: 600 TABLET, EXTENDED RELEASE ORAL at 15:32

## 2017-11-27 RX ADMIN — ROCURONIUM BROMIDE 5 MG: 10 INJECTION, SOLUTION INTRAVENOUS at 22:05

## 2017-11-27 RX ADMIN — FENTANYL CITRATE 25 MCG: 50 INJECTION, SOLUTION INTRAMUSCULAR; INTRAVENOUS at 22:47

## 2017-11-27 RX ADMIN — SODIUM CHLORIDE 2448 ML: 900 INJECTION, SOLUTION INTRAVENOUS at 11:38

## 2017-11-27 RX ADMIN — ALBUTEROL SULFATE 2.5 MG: 2.5 SOLUTION RESPIRATORY (INHALATION) at 23:48

## 2017-11-27 RX ADMIN — MIDAZOLAM HYDROCHLORIDE 1 MG: 1 INJECTION, SOLUTION INTRAMUSCULAR; INTRAVENOUS at 21:57

## 2017-11-27 RX ADMIN — LIDOCAINE HYDROCHLORIDE 60 MG: 20 INJECTION, SOLUTION EPIDURAL; INFILTRATION; INTRACAUDAL; PERINEURAL at 22:05

## 2017-11-27 RX ADMIN — NEOSTIGMINE METHYLSULFATE 3 MG: 1 INJECTION INTRAVENOUS at 23:08

## 2017-11-27 RX ADMIN — ROCURONIUM BROMIDE 10 MG: 10 INJECTION, SOLUTION INTRAVENOUS at 22:16

## 2017-11-27 RX ADMIN — INSULIN LISPRO 2 UNITS: 100 INJECTION, SOLUTION INTRAVENOUS; SUBCUTANEOUS at 17:26

## 2017-11-27 RX ADMIN — ENOXAPARIN SODIUM 40 MG: 40 INJECTION SUBCUTANEOUS at 12:24

## 2017-11-27 RX ADMIN — GLYCOPYRROLATE 0.5 MG: 0.2 INJECTION INTRAMUSCULAR; INTRAVENOUS at 23:08

## 2017-11-27 RX ADMIN — SUCCINYLCHOLINE CHLORIDE 160 MG: 20 INJECTION INTRAMUSCULAR; INTRAVENOUS at 22:05

## 2017-11-27 RX ADMIN — CEFEPIME HYDROCHLORIDE 2 G: 2 INJECTION, POWDER, FOR SOLUTION INTRAVENOUS at 11:54

## 2017-11-27 RX ADMIN — KETOROLAC TROMETHAMINE 15 MG: 30 INJECTION, SOLUTION INTRAMUSCULAR at 08:55

## 2017-11-27 RX ADMIN — IOPAMIDOL 95 ML: 612 INJECTION, SOLUTION INTRAVENOUS at 11:13

## 2017-11-27 RX ADMIN — PROPOFOL 100 MG: 10 INJECTION, EMULSION INTRAVENOUS at 22:05

## 2017-11-27 NOTE — IP AVS SNAPSHOT
[facility] 
 
 
 [address] [facility] 
[phone] Patient: Brent Almonte MRN: [id number] GTS:5/0/1322 About your hospitalization You were admitted on:  November 27, 2017 You last received care in the:  [facility] 4M POST SURG ORT 1 You were discharged on:  December 1, 2017 Why you were hospitalized Your primary diagnosis was:  Sepsis (Hcc) Your diagnoses also included:  Lactic Acidosis, Hyperlipidemia, Depression, Hyperglycemia Due To Type 2 Diabetes Mellitus (Hcc), Septic Arthritis (Hcc) Things You Need To Do (next 8 weeks) Schedule an appointment with Jose Villafana MD as soon as possible for a visit today Phone:  [phone] Where:  [address], [address] [id number] Follow up with [facility] [phone] Follow up with Caitlin Orona Phone:  [phone] Where:  [address] [id number] Discharge Orders None A check terry indicates which time of day the medication should be taken. My Medications TAKE these medications as instructed Instructions Each Dose to Equal  
 Morning Noon Evening Bedtime  
 aspirin delayed-release 81 mg tablet Your last dose was: Your next dose is: Take 81 mg by mouth daily. 81 mg  
    
   
   
   
  
 atorvastatin 40 mg tablet Commonly known as:  LIPITOR Your last dose was: Your next dose is: Take 40 mg by mouth daily. 40 mg  
    
   
   
   
  
 cefTRIAXone 2 gram 2 g, ADDaptor 1 Device IVPB Start taking on:  12/2/2017 Your last dose was: Your next dose is:    
   
   
 2 g by IntraVENous route every twenty-four (24) hours for 25 days. 2 g  
    
   
   
   
  
 escitalopram oxalate 20 mg tablet Commonly known as:  Angella Hubre Your last dose was: Your next dose is: Take 20 mg by mouth daily. 20 mg  
    
   
   
   
  
 ferrous sulfate 325 mg (65 mg iron) tablet Your last dose was: Your next dose is: Take 1 Tab by mouth daily (with breakfast). 325 mg  
    
   
   
   
  
 losartan 25 mg tablet Commonly known as:  COZAAR Your last dose was: Your next dose is: Take 25 mg by mouth daily. 25 mg  
    
   
   
   
  
 LOVAZA 1 gram capsule Generic drug:  omega-3 acid ethyl esters Your last dose was: Your next dose is: Take 4 g by mouth daily (with breakfast). 4 g  
    
   
   
   
  
 metFORMIN 500 mg tablet Commonly known as:  GLUCOPHAGE Your last dose was: Your next dose is: Take 1,000 mg by mouth daily (with breakfast). 1000 mg  
    
   
   
   
  
 vancomycin 10 gram 1,250 mg IVPB Your last dose was: Your next dose is:    
   
   
 1,250 mg by IntraVENous route every eight (8) hours for 26 days. 1250 mg Where to Get Your Medications Information on where to get these meds will be given to you by the nurse or doctor. ! Ask your nurse or doctor about these medications  
  cefTRIAXone 2 gram 2 g, ADDaptor 1 Device IVPB  
 vancomycin 10 gram 1,250 mg IVPB Discharge Instructions ORTHO: 
 
Left shoulder Physical Therapy with pendulum motion. Antibiotics as prescribed. Follow up with Dr. Kristian Fierro in 10 days. Call [phone] for appointment. [employer] Announcement We are excited to announce that we are making your provider's discharge notes available to you in [employer]. You will see these notes when they are completed and signed by the physician that discharged you from your recent hospital stay.   If you have any questions or concerns about any information you see in [employer], please call the [place] Department where you were seen or reach out to your Primary Care Provider for more information about your plan of care. Introducing [facility] HE[facility]! [facility] introduces [employer] patient portal. Now you can access parts of your medical record, email your doctor's office, and request medication refills online. 1. In your internet browser, go to [email or web address] [email or web address] 2. Click on the First Time User? Click Here link in the Sign In box. You will see the New Member Sign Up page. 3. Enter your [employer] Access Code exactly as it appears below. You will not need to use this code after youve completed the sign-up process. If you do not sign up before the expiration date, you must request a new code. · [employer] Access Code: [email or web address] Expires: 3/1/2018  2:08 PM 
 
4. Enter the last four digits of your Social Security Number (xxxx) and Date of Birth (mm/dd/yyyy) as indicated and click Submit. You will be taken to the next sign-up page. 5. Create a [employer] ID. This will be your [employer] login ID and cannot be changed, so think of one that is secure and easy to remember. 6. Create a [employer] password. You can change your password at any time. 7. Enter your Password Reset Question and Answer. This can be used at a later time if you forget your password. 8. Enter your e-mail address. You will receive e-mail notification when new information is available in [address] 9. Click Sign Up. You can now view and download portions of your medical record. 10. Click the Download Summary menu link to download a portable copy of your medical information. If you have questions, please visit the Frequently Asked Questions section of the [employer] website. Remember, [employer] is NOT to be used for urgent needs. For medical emergencies, dial 911. Now available from your iPhone and Android! Unresulted Labs-Please follow up with your PCP about these lab tests Order Current Status XR US GUIDED VASCULAR ACCESS In process CULTURE, BLOOD Preliminary result CULTURE, BODY FLUID W GRAM STAIN Preliminary result MISC. LAB TEST Preliminary result Providers Seen During Your Hospitalization Provider Specialty Primary office phone Lashon Jordan MD Emergency Medicine [phone] Na Bullard MD Internal Medicine [phone] Torito Baker MD Hospitalist [phone] Your Primary Care Physician (PCP) Primary Care Physician Office Phone Office Fax Meron Holt [phone] You are allergic to the following No active allergies Recent Documentation Height Weight Breastfeeding? BMI OB Status Smoking Status 1.422 m 92.7 kg No 45.83 kg/m2 Postmenopausal Former Smoker Emergency Contacts Name Discharge Info Relation Home Work Mobile [address] [facility] CAREGIVER [3] Spouse [3] [phone] [address] [facility] CAREGIVER [3] Daughter [21] [phone] Marisa Chu  Daughter [21] [phone] Patient Belongings The following personal items are in your possession at time of discharge: 
  Dental Appliances: None  Visual Aid: None      Home Medications: None   Jewelry: Other (comment)  Clothing: Other (comment) (pt belongings in pt's room)    Other Valuables: Other (comment)  Personal Items Sent to Safe: n/a Please provide this summary of care documentation to your next provider. Signatures-by signing, you are acknowledging that this After Visit Summary has been reviewed with you and you have received a copy. Patient Signature:  ____________________________________________________________ Date:  ____________________________________________________________  
  
Silvia Javier Provider Signature:  ____________________________________________________________ Date:  ____________________________________________________________

## 2017-11-27 NOTE — PROGRESS NOTES
1750  TRANSFER - IN REPORT:    Verbal report received from [facility] (name) on Kevin Hoang  being received from ED (unit) for routine progression of care      Report consisted of patients Situation, Background, Assessment and   Recommendations(SBAR). Information from the following report(s) SBAR, ED Summary, Intake/Output, MAR, Recent Results and Cardiac Rhythm NSR/ST was reviewed with the receiving nurse. Opportunity for questions and clarification was provided. Assessment completed upon patients arrival to unit and care assumed. 1845  Pt arrived on floor from the ED. Resting comfortably in bed. Daughter at the bedside. Call bell within reach. 1900  Bedside and Verbal shift change report given to Nicki Jauregui RN (oncoming nurse) by Chiki Day RN (offgoing nurse). Report included the following information SBAR, Kardex, Intake/Output, MAR, Recent Results and Cardiac Rhythm NSR/ST.

## 2017-11-27 NOTE — PROGRESS NOTES
[facility] Dosing Services: 11/27/17    Consult for Enoxaparin by Dr. Ute Dinero made for this 69 y.o. female, for prophylaxis of  DVT. Wt Readings from Last 1 Encounters:   11/27/17 81.6 kg (180 lb)       Ht Readings from Last 1 Encounters:   11/27/17 142.2 cm (56\")           Previous Dose Enoxaparin 40mg daily   Creatinine Clearance Estimated Creatinine Clearance: 51.2 mL/min (based on Cr of 0.87). Creatinine Lab Results   Component Value Date/Time    Creatinine 0.87 11/27/2017 08:50 AM       Platelet Lab Results   Component Value Date/Time    PLATELET 332 49/65/5420 08:50 AM      H/H Lab Results   Component Value Date/Time    HGB 10.8 11/27/2017 08:50 AM        Other anticoagulants: None  Relevant drug interactions: None    Pharmacist made change to enoxaparin therapy based on:  [ X ] BMI: dose changed to: 40mg every 12 Hours    - Pharmacy to automatically make dose adjustment for renal dysfunction (creatinine clearance less than 30 mL/min)  - Pharmacy to automatically make dose adjustment for obesity (BMI greater than 40)  - Pharmacy to make dose rounding adjustments per [facility] dose adjustment scale. Pharmacy to monitor patients progress. Will make dose adjustment as needed per changing renal function. Will communicate further recommendations regarding patients anticoagulation therapy with prescriber.     Laz Beaulieu, PharmD, BCPS  Contact information: [phone]

## 2017-11-27 NOTE — ED NOTES
Dr. Jose Blakely at bedside. Multiple family in room. Pt was assisted to position of comfort. Finished dinner tray. Will give insulin when pt is available.

## 2017-11-27 NOTE — ED PROVIDER NOTES
HPI Comments: 69 y.o. female with past medical history significant for transient ischemic attack, appendectomy, depression, pneumonia, cellulitis, and diabetes who presents from home with chief complaint of arm pain. Patient states onset of left upper arm pain and swelling over the past several days that is aggravated with range of motion and upon palpation. Patient mentions associated left neck swelling and a sore throat though she is unsure if this is from the neck swelling. Patient admits she has been having trouble swallowing the last 2-3 days though she is eating and drinking an adequate amount. Patient does mention a loss of appetite. Patient also complains of feeling fatigued and having thick congestion. Patient denies any known trauma or any hx of the present sx. Patient does not feel like she is having cold sx. Patient denies any other sx including neck pain, new cough, vomiting, diarrhea, new shortness of breath, numbness, and tingling. There are no other acute medical concerns at this time. Old Chart Review: Per note, patient was evaluated here on 03/25/17 for internal hemorrhoids and generalized weakness with rectal bleeding for 1 week. Patient was admitted here from 08/25/16 through the 27th for GI bleed and anemia. Patient had flex sigmoidoscopy and EGD that revealed polyp and sliding hiatal hernia. Patient's bleeding was likely from hemorrhoids. Social hx: Tobacco Use: No (former smoker), Alcohol Use: No, Drug Use: No    PCP: Sherice Lomax MD    Note written by Manuelito Hilton, as dictated by Ga Contreras MD 8:37 AM          The history is provided by the patient and medical records.         Past Medical History:   Diagnosis Date    Hypercholesterolemia     Hyperglycemia due to type 2 diabetes mellitus ([facility] [address] 8/4/2016    Ill-defined condition     cellulitis    Major depression     depression    Pneumonia     2011 or so    Transient ischemic attack        Past Surgical History:   Procedure Laterality Date    COLONOSCOPY  6/24/2016         COLONOSCOPY N/A 6/24/2016    COLONOSCOPY performed by Jn Pereira MD at [address] N/A 8/26/2016    SIGMOIDOSCOPY FLEXIBLE performed by Jn Pereira MD at [address] [facility] HX APPENDECTOMY      UPPER GI ENDOSCOPY,DIAGNOSIS  8/26/2016              No family history on file. Social History     Social History    Marital status:      Spouse name: N/A    Number of children: N/A    Years of education: N/A     Occupational History    Not on file. Social History Main Topics    Smoking status: Former Smoker    Smokeless tobacco: Not on file    Alcohol use No    Drug use: No    Sexual activity: Not on file     Other Topics Concern    Not on file     Social History Narrative         ALLERGIES: Review of patient's allergies indicates no known allergies. Review of Systems   Constitutional: Positive for fatigue. HENT: Positive for congestion, sore throat and trouble swallowing. Respiratory: Positive for cough (chronic) and shortness of breath (chronic). Gastrointestinal: Negative for diarrhea, nausea and vomiting. Musculoskeletal: Positive for arthralgias (left shoulder), joint swelling and myalgias (left upper arm). Negative for neck pain. Neurological: Positive for weakness. Negative for numbness. All other systems reviewed and are negative. Vitals:    11/27/17 0813   BP: 134/66   Pulse: (!) 103   Resp: 20   Temp: 99.3 °F (37.4 °C)   SpO2: 92%   Weight: 81.6 kg (180 lb)   Height: 4' 8\" (1.422 m)            Physical Exam   Constitutional: She is oriented to person, place, and time. She appears well-developed and well-nourished. Patient is chronically ill-appearing   Overweight   HENT:   Head: Normocephalic and atraumatic. Mouth/Throat: Posterior oropharyngeal erythema present.    Posterior oropharynx is erythematous    Eyes: Conjunctivae are normal. No scleral icterus. Neck: Neck supple. No tracheal deviation present. Patient's entire neck appears large; suspect obesity. No apparent swelling of the left neck    Cardiovascular: Normal rate, regular rhythm, normal heart sounds and intact distal pulses. Exam reveals no gallop and no friction rub. No murmur heard. 2+ radial pulses bilaterally   Pulmonary/Chest: Effort normal and breath sounds normal. She has no wheezes. She has no rales. Abdominal: Soft. She exhibits no distension. There is no tenderness. There is no rebound and no guarding. Musculoskeletal: She exhibits tenderness. She exhibits no edema. Patient has point tenderness over the left anterior shoulder with decrease range of motion    Neurological: She is alert and oriented to person, place, and time. Normal sensation of the upper extemities   Skin: Skin is warm and dry. No rash noted. Psychiatric: She has a normal mood and affect. Nursing note and vitals reviewed. Note written by Manuelito Membreno, as dictated by Flakito Fernandez MD 8:37 AM    [facility]  ED Course       Procedures    ED EKG interpretation:  Rhythm: normal sinus rhythm; and regular . Rate (approx.): 97; Axis: normal; ST/T wave: normal; No acute ST changes. Note written by Manuelito Membreno, as dictated by Flakito Fernandez MD 8:48 AM    CONSULT NOTE:  11:54 Morse Baumgarten, MD spoke with Dr. Arias Cox, Consult for Hospitalist.  Discussed available diagnostic tests and clinical findings. Provider is in agreement with care plans as outlined. Provider will evaluate the patient for admission to the hospital.    Total critical care time spent exclusive of procedures:  35 min.   Flakito Fernandez MD  12:07 PM    A/P: meets sepsis criteria with tachycardia, WBC - 27, lactate > 2; suspected but unknown source of infection; reassuring exam; BP stable; remainder of labs ok; CT neck and thorax ok; UA pending; getting 30 cc/kg NS and broad-spectrum AB's; admit for further management.   Sergio Beaver MD  12:09 PM

## 2017-11-27 NOTE — H&P
[facility]  [address] [facility], [facility] [address]  [phone]    Admission History and Physical      NAME:  Divya Slaughter   :   1940   MRN:  [id number]     PCP:  Mita Lewis MD     Date/Time:  2017         Subjective:     CHIEF COMPLAINT: \"I don't know\"     HISTORY OF PRESENT ILLNESS:     Ms. Sarika Nava is a 69 y.o.  female with PMH of DM, XOL, depression admitted for SIRS criteria. Per pt, presented to the ED with c/o cough, arm pain and multiple other complaints. Denies fevers/chills. No N/V/D or ab pain. No skin breakdown. No sick contacts. Pt was noted to have a leukocytosis and elevated lactic acid. CT chest and neck were unremarkable. Past Medical History:   Diagnosis Date    Hypercholesterolemia     Hyperglycemia due to type 2 diabetes mellitus ([facility] [address] 2016    Ill-defined condition     cellulitis    Major depression     depression    Pneumonia     2011 or so    Transient ischemic attack         Past Surgical History:   Procedure Laterality Date    COLONOSCOPY  2016         COLONOSCOPY N/A 2016    COLONOSCOPY performed by Jose Desai MD at [address] [place] [address] N/A 2016    SIGMOIDOSCOPY FLEXIBLE performed by Jose Desai MD at [address] APPENDECTOMY      UPPER GI ENDOSCOPY,DIAGNOSIS  2016            Social History   Substance Use Topics    Smoking status: Former Smoker    Smokeless tobacco: Not on file    Alcohol use No      FH:   HTN     No Known Allergies     Prior to Admission medications    Medication Sig Start Date End Date Taking? Authorizing Provider   omega-3 acid ethyl esters (LOVAZA) 1 gram capsule Take 4 g by mouth daily (with breakfast). Yes Historical Provider   ferrous sulfate 325 mg (65 mg iron) tablet Take 1 Tab by mouth daily (with breakfast). 8/10/16  Yes Jimena Wright MD   aspirin delayed-release 81 mg tablet Take 81 mg by mouth daily.    Yes Historical Provider   atorvastatin (LIPITOR) 40 mg tablet Take 40 mg by mouth daily. Yes Historical Provider   escitalopram oxalate (LEXAPRO) 20 mg tablet Take 20 mg by mouth daily. Yes Historical Provider   metFORMIN (GLUCOPHAGE) 500 mg tablet Take 1,000 mg by mouth daily (with breakfast). Yes Historical Provider   losartan (COZAAR) 25 mg tablet Take 25 mg by mouth daily. Yes Historical Provider         Review of Systems:  (bold if positive, if negative)    Gen:  Eyes:  ENT:  CVS:  Pulm:  GI:    :    MS:  Skin:  Psych:  Endo:    Hem:  Renal:    Neuro:     Cough, sputum        Objective:      VITALS:    Vital signs reviewed; most recent are:    Visit Vitals    /54    Pulse 87    Temp 98.2 °F (36.8 °C)    Resp 25    Ht 4' 8\" (1.422 m)    Wt 81.6 kg (180 lb)    SpO2 91%    BMI 40.36 kg/m2     SpO2 Readings from Last 6 Encounters:   11/27/17 91%   03/25/17 95%   08/27/16 94%   08/10/16 94%   06/24/16 96%   06/10/16 96%        No intake or output data in the 24 hours ending 11/27/17 1529         Exam:     Physical Exam:    Gen:  Well-developed, well-nourished, in no acute distress  HEENT:  Pink conjunctivae, PERRL, hearing intact to voice, moist mucous membranes  Neck:  Supple, without masses, thyroid non-tender  Resp:  No accessory muscle use, clear breath sounds without wheezes rales or rhonchi  Card:  No murmurs, normal S1, S2 without thrills, bruits or peripheral edema  Abd: Obese ab. Non-tender. Distended   Lymph:  No cervical adenopathy  Musc:  No cyanosis or clubbing  Skin:  No rashes or ulcers, skin turgor is good  Neuro:  Cranial nerves 3-12 are grossly intact,  strength is 5/5 bilaterally, dorsi / plantarflexion strength is 5/5 bilaterally, follows commands appropriately  Psych:  Alert with good insight.   Oriented to person, place, and time       Labs:    Recent Labs      11/27/17   0850   WBC  26.9*   HGB  10.8*   HCT  34.6*   PLT  333     Recent Labs      11/27/17   0850   NA  138 K  3.9   CL  101   CO2  26   GLU  221*   BUN  13   CREA  0.87   CA  9.0   ALB  3.0*   SGOT  26   ALT  39     No components found for: GLPOC  No results for input(s): PH, PCO2, PO2, HCO3, FIO2 in the last 72 hours. No results for input(s): INR in the last 72 hours. No lab exists for component: INREXT    CT chest =>   Hepatic steatosis. No acute process     CT neck =>   1. No mass lesion is demonstrated. 2. Retropharyngeal course of the internal carotid arteries on the right and on  the left with tortuosity more so on the right. Assessment/Plan:    SIRS - no clear source of infection. Pt with elevated HR and leukocytosis which appears to be chronic   -blood cultures in process   -UA without infection   -neck and chest CT negative   -check CT ab/pelvis   -no other focal source of infection; pt does have bronchitis but doubt this is causing such an elevated WBC count => start levo    Leukocytosis - ?reactive to another process as no clear source of infection. As above, checking blood cultures and CT ab/pelvis. Has not been on steroids  -check peripheral smear  -will ask hematology to assist       Hyperlipidemia (12/20/2013)  -continue statin       Depression (12/21/2013)  -continue Lexapro       Hyperglycemia due to type 2 diabetes mellitus ([address] (8/4/2016)  -ISS   -BG checks AC TID and qHS   -hold metformin due to LA   -check A1c      Obesity, morbid ([address] (11/27/2017)  -counseled on weight loss       Lactic acidosis (11/27/2017) - ?mild IVVD ? metformin   -IVF's     Surrogate decision maker: , Daughter    Total time spent with patient: [phone] [phone] discussed with: Patient and Family    Discussed:  Code Status, Care Plan and D/C Planning    Prophylaxis:  Lovenox    Probable Disposition:  Home w/Family           ___________________________________________________    Attending Physician: Kalyan Solis MD

## 2017-11-27 NOTE — PROGRESS NOTES
Case Management Note: CM is following t in ER for initial discharge planning. EMR reviewed. CM met with pt and her spouse Gene [address], [phone]. and daughter Dwain Oneil [phone]  at bedside to obtain hx for this needs assessment. Pt also has another daughter Stormy Lawrence [phone]. Pt resides with her spouse are retired and reside in a Senior apartment with an elevator. [address], [place], [address] PTA, Pt was ambulatory, needs assist with meals and Medication. pt and spouse do not drive. A Neighbor and family members  provide transportation. Pt has perscription coverage and uses [facility] and are dispensed in bubble packs. The pt's spouse assist with medication management. Pt uses [address]  PCP is Janice King  Plan to discharge home. PT/OT eval order to determine any discharge planning needs  Arley Angelucci RN   Care Management Interventions  PCP Verified by CM:  Yes (Dr Ivy Santillan)  Mode of Transport at Discharge: Self  Transition of Care Consult (CM Consult): Discharge Planning  Discharge Durable Medical Equipment: No  Physical Therapy Consult: Yes  Occupational Therapy Consult: Yes  Speech Therapy Consult: No  Current Support Network: Lives with Spouse  Confirm Follow Up Transport: Family  Plan discussed with Pt/Family/Caregiver: Yes  Discharge Location  Discharge Placement: Home

## 2017-11-27 NOTE — CONSULTS
[facility] [place] at [facility]  [address] [facility], [address] [facility] [address] [facility] [place]: [phone]  F: [phone]      Reason for Visit:   Bonnie Hunter is a 69 y.o. female who is seen in consultation at the request of Dr. Amol Larson for evaluation of leukocytosis. History of Present Illness:   Per pt, presented to the ED with c/o cough, arm pain and multiple other complaints. Denies fevers/chills. No N/V/D or ab pain. No skin breakdown. No sick contacts. Pt was noted to have a leukocytosis and elevated lactic acid. CT chest and neck were unremarkable. In review of CC, leukocytosis goes back 3 years. Daughter states that she is gaining weight and abdominal girth. Past Medical History:   Diagnosis Date    Hypercholesterolemia     Hyperglycemia due to type 2 diabetes mellitus ([facility] [address] 8/4/2016    Ill-defined condition     cellulitis    Major depression     depression    Pneumonia     2011 or so    Transient ischemic attack       Past Surgical History:   Procedure Laterality Date    COLONOSCOPY  6/24/2016         COLONOSCOPY N/A 6/24/2016    COLONOSCOPY performed by Loren Martell MD at [address] N/A 8/26/2016    SIGMOIDOSCOPY FLEXIBLE performed by Loren Martell MD at [address] [facility] APPENDECTOMY      UPPER GI ENDOSCOPY,DIAGNOSIS  8/26/2016           Social History   Substance Use Topics    Smoking status: Former Smoker    Smokeless tobacco: Not on file    Alcohol use No      No family history on file.   Current Facility-Administered Medications   Medication Dose Route Frequency    sodium chloride 0.9 % bolus infusion 1,000 mL  1,000 mL IntraVENous ONCE    sodium chloride (NS) flush 5-10 mL  5-10 mL IntraVENous PRN    0.9% sodium chloride infusion  100 mL/hr IntraVENous CONTINUOUS    sodium chloride (NS) flush 5-10 mL  5-10 mL IntraVENous Q8H    sodium chloride (NS) flush 5-10 mL  5-10 mL IntraVENous PRN    acetaminophen (TYLENOL) tablet 650 mg  650 mg Oral Q4H PRN    naloxone (NARCAN) injection 0.4 mg  0.4 mg IntraVENous PRN    ondansetron (ZOFRAN) injection 4 mg  4 mg IntraVENous Q4H PRN    enoxaparin (LOVENOX) injection 40 mg  40 mg SubCUTAneous Q12H    insulin lispro (HUMALOG) injection   SubCUTAneous AC&HS    glucose chewable tablet 16 g  4 Tab Oral PRN    dextrose (D50W) injection syrg 12.5-25 g  12.5-25 g IntraVENous PRN    glucagon (GLUCAGEN) injection 1 mg  1 mg IntraMUSCular PRN    [START ON 11/28/2017] aspirin delayed-release tablet 81 mg  81 mg Oral DAILY    [START ON 11/28/2017] atorvastatin (LIPITOR) tablet 40 mg  40 mg Oral DAILY    [START ON 11/28/2017] escitalopram oxalate (LEXAPRO) tablet 20 mg  20 mg Oral DAILY    [START ON 11/28/2017] ferrous sulfate tablet 325 mg  325 mg Oral DAILY WITH BREAKFAST    [START ON 11/28/2017] omega-3 acid ethyl esters (LOVAZA) capsule 4,000 mg  4 g Oral DAILY WITH BREAKFAST    guaiFENesin ER (MUCINEX) tablet 600 mg  600 mg Oral Q12H    [START ON 11/28/2017] levoFLOXacin (LEVAQUIN) tablet 750 mg  750 mg Oral Q24H     Current Outpatient Prescriptions   Medication Sig    omega-3 acid ethyl esters (LOVAZA) 1 gram capsule Take 4 g by mouth daily (with breakfast).  ferrous sulfate 325 mg (65 mg iron) tablet Take 1 Tab by mouth daily (with breakfast).  aspirin delayed-release 81 mg tablet Take 81 mg by mouth daily.  atorvastatin (LIPITOR) 40 mg tablet Take 40 mg by mouth daily.  escitalopram oxalate (LEXAPRO) 20 mg tablet Take 20 mg by mouth daily.  metFORMIN (GLUCOPHAGE) 500 mg tablet Take 1,000 mg by mouth daily (with breakfast).  losartan (COZAAR) 25 mg tablet Take 25 mg by mouth daily. No Known Allergies     Review of Systems: A complete review of systems was obtained, negative except as described above.     Physical Exam:     Visit Vitals    /59    Pulse 89    Temp 98.2 °F (36.8 °C)    Resp 29    Ht 4' 8\" (1.422 m)    Wt 180 lb (81.6 kg)  SpO2 91%    BMI 40.36 kg/m2     ECOG PS: 1  General: No distress  Eyes: PERRLA, anicteric sclerae  HENT: Atraumatic, OP clear  Neck: Supple  Lymphatic: No cervical, supraclavicular, or inguinal adenopathy  Respiratory: CTAB, normal respiratory effort  CV: Normal rate, regular rhythm, no murmurs, no peripheral edema  GI: Soft, nontender, + distended, no masses, no hepatomegaly, no splenomegaly    Skin: No rashes, ecchymoses, or petechiae. Normal temperature, turgor, and texture. Psych: Alert, oriented, appropriate affect, normal judgment/insight    Results:     Lab Results   Component Value Date/Time    WBC 26.9 11/27/2017 08:50 AM    HGB 10.8 11/27/2017 08:50 AM    HCT 34.6 11/27/2017 08:50 AM    PLATELET 243 91/67/6635 08:50 AM    MCV 90.1 11/27/2017 08:50 AM    ABS. NEUTROPHILS 23.4 11/27/2017 08:50 AM     Lab Results   Component Value Date/Time    Sodium 138 11/27/2017 08:50 AM    Potassium 3.9 11/27/2017 08:50 AM    Chloride 101 11/27/2017 08:50 AM    CO2 26 11/27/2017 08:50 AM    Glucose 221 11/27/2017 08:50 AM    BUN 13 11/27/2017 08:50 AM    Creatinine 0.87 11/27/2017 08:50 AM    GFR est AA >60 11/27/2017 08:50 AM    GFR est non-AA >60 11/27/2017 08:50 AM    Calcium 9.0 11/27/2017 08:50 AM    Glucose (POC) 193 11/27/2017 05:02 PM     Lab Results   Component Value Date/Time    Bilirubin, total 0.5 11/27/2017 08:50 AM    ALT (SGPT) 39 11/27/2017 08:50 AM    AST (SGOT) 26 11/27/2017 08:50 AM    Alk. phosphatase 103 11/27/2017 08:50 AM    Protein, total 7.4 11/27/2017 08:50 AM    Albumin 3.0 11/27/2017 08:50 AM    Globulin 4.4 11/27/2017 08:50 AM     11/27/17 CT chest/neck  IMPRESSION:  Hepatic steatosis. No acute abnormality. IMPRESSION:  1. No mass lesion is demonstrated. 2. Retropharyngeal course of the internal carotid arteries on the right and on  the left with tortuosity more so on the right. Records reviewed and summarized above. Radiology report(s) reviewed above.       Assessment/plan: 1. Neutrophilia:  At this time, the etiology is uncertain and the differential diagnosis quite broad. Neutrophilia can be caused by active cigarette smoking, stress (anxiety, pain, exercise, etc), inflammatory conditions, infection, medications, asplenia, malignancy, and myeloproliferative disorders. Occasionally, patients have chronic idiopathic neutrophilia in which no cause is identified. I will order labwork today to help determine the underlying etiology. This will include ESR, CRP, BCR-ABL, JAK2.  abd CT ordered. I appreciate the opportunity to participate in Ms. Gina Rider's care.     Signed By: Kole Parsons MD

## 2017-11-27 NOTE — PROGRESS NOTES
[facility]: MED RECONCILIATION    Comments/Recommendations:   Patient is awake, alert, and knowledgeable about home medications   Provides detailed medication list  Blood pressure and blood sugar are not monitored at home    Medications added:     · None    Medications removed:    · Benzonatate  · Calcium carbonate  · Breo Ellipta  · Hydrocortisone    Medications adjusted:    · Metformin changed to 1000 mg daily    Allergies: Review of patient's allergies indicates no known allergies. Prior to Admission Medications:     Medication Documentation Review Audit       Reviewed by JAVIER CanoD (Pharmacist) on 11/27/17 at 1354         Medication Sig Documenting Provider Last Dose Status Taking?      aspirin delayed-release 81 mg tablet Take 81 mg by mouth daily. Historical Provider 11/27/2017 Unknown time Active Yes    atorvastatin (LIPITOR) 40 mg tablet Take 40 mg by mouth daily. Historical Provider 11/27/2017 Unknown time Active Yes    escitalopram oxalate (LEXAPRO) 20 mg tablet Take 20 mg by mouth daily. Historical Provider 11/27/2017 Unknown time Active Yes    ferrous sulfate 325 mg (65 mg iron) tablet Take 1 Tab by mouth daily (with breakfast). Padma Horta MD 11/27/2017 Unknown time Active Yes    losartan (COZAAR) 25 mg tablet Take 25 mg by mouth daily. Historical Provider 11/27/2017 Unknown time Active Yes    metFORMIN (GLUCOPHAGE) 500 mg tablet Take 1,000 mg by mouth daily (with breakfast). Historical Provider 11/27/2017 Unknown time Active Yes    omega-3 acid ethyl esters (LOVAZA) 1 gram capsule Take 4 g by mouth daily (with breakfast).  Historical Provider 11/27/2017 Unknown time Active Yes                  Thank you,    Agustin Montez, JavierD, BCPS

## 2017-11-27 NOTE — ED TRIAGE NOTES
Pt arrives via triage with c/o of left arm weakness for over a weak. Pt states arm is painful 8/10 with limited mobility. Pt also states has \"a lot of phlegm that I'm just can't clear\" with difficulty swallowing and swelling to left neck. Caregiver with patient feels patient is dehydrated as patient can't clear secretions well. Pt speaking in complete sentences, A&Ox4, with no weakness to legs or right arm.

## 2017-11-28 ENCOUNTER — APPOINTMENT (OUTPATIENT)
Dept: CT IMAGING | Age: 77
DRG: 549 | End: 2017-11-28
Attending: INTERNAL MEDICINE
Payer: MEDICARE

## 2017-11-28 ENCOUNTER — HOME CARE VISIT (OUTPATIENT)
Dept: HOME HEALTH SERVICES | Facility: HOME HEALTH | Age: 77
End: 2017-11-28

## 2017-11-28 LAB
ANION GAP SERPL CALC-SCNC: 10 MMOL/L (ref 5–15)
BASOPHILS # BLD: 0 K/UL (ref 0–0.1)
BASOPHILS NFR BLD: 0 % (ref 0–1)
BUN SERPL-MCNC: 8 MG/DL (ref 6–20)
BUN/CREAT SERPL: 11 (ref 12–20)
CALCIUM SERPL-MCNC: 7.9 MG/DL (ref 8.5–10.1)
CHLORIDE SERPL-SCNC: 106 MMOL/L (ref 97–108)
CO2 SERPL-SCNC: 24 MMOL/L (ref 21–32)
CREAT SERPL-MCNC: 0.74 MG/DL (ref 0.55–1.02)
CRP SERPL-MCNC: 12.29 MG/DL
EOSINOPHIL # BLD: 0 K/UL (ref 0–0.4)
EOSINOPHIL NFR BLD: 0 % (ref 0–7)
ERYTHROCYTE [DISTWIDTH] IN BLOOD BY AUTOMATED COUNT: 16.4 % (ref 11.5–14.5)
ERYTHROCYTE [SEDIMENTATION RATE] IN BLOOD: 77 MM/HR (ref 0–30)
GLUCOSE BLD STRIP.AUTO-MCNC: 132 MG/DL (ref 65–100)
GLUCOSE BLD STRIP.AUTO-MCNC: 136 MG/DL (ref 65–100)
GLUCOSE BLD STRIP.AUTO-MCNC: 141 MG/DL (ref 65–100)
GLUCOSE BLD STRIP.AUTO-MCNC: 157 MG/DL (ref 65–100)
GLUCOSE SERPL-MCNC: 187 MG/DL (ref 65–100)
HCT VFR BLD AUTO: 27.5 % (ref 35–47)
HGB BLD-MCNC: 8.8 G/DL (ref 11.5–16)
LYMPHOCYTES # BLD: 1.1 K/UL (ref 0.8–3.5)
LYMPHOCYTES NFR BLD: 6 % (ref 12–49)
MAGNESIUM SERPL-MCNC: 1.5 MG/DL (ref 1.6–2.4)
MCH RBC QN AUTO: 28.6 PG (ref 26–34)
MCHC RBC AUTO-ENTMCNC: 32 G/DL (ref 30–36.5)
MCV RBC AUTO: 89.3 FL (ref 80–99)
MONOCYTES # BLD: 1.8 K/UL (ref 0–1)
MONOCYTES NFR BLD: 9 % (ref 5–13)
NEUTS SEG # BLD: 16.6 K/UL (ref 1.8–8)
NEUTS SEG NFR BLD: 85 % (ref 32–75)
PLATELET # BLD AUTO: 271 K/UL (ref 150–400)
POTASSIUM SERPL-SCNC: 3.9 MMOL/L (ref 3.5–5.1)
RBC # BLD AUTO: 3.08 M/UL (ref 3.8–5.2)
SERVICE CMNT-IMP: ABNORMAL
SODIUM SERPL-SCNC: 140 MMOL/L (ref 136–145)
WBC # BLD AUTO: 19.5 K/UL (ref 3.6–11)

## 2017-11-28 PROCEDURE — 74011636320 HC RX REV CODE- 636/320: Performed by: RADIOLOGY

## 2017-11-28 PROCEDURE — 74011000250 HC RX REV CODE- 250: Performed by: ANESTHESIOLOGY

## 2017-11-28 PROCEDURE — 83735 ASSAY OF MAGNESIUM: CPT | Performed by: INTERNAL MEDICINE

## 2017-11-28 PROCEDURE — 65270000029 HC RM PRIVATE

## 2017-11-28 PROCEDURE — 74011636637 HC RX REV CODE- 636/637: Performed by: INTERNAL MEDICINE

## 2017-11-28 PROCEDURE — 80048 BASIC METABOLIC PNL TOTAL CA: CPT | Performed by: INTERNAL MEDICINE

## 2017-11-28 PROCEDURE — 74011250636 HC RX REV CODE- 250/636: Performed by: INTERNAL MEDICINE

## 2017-11-28 PROCEDURE — 74011250637 HC RX REV CODE- 250/637: Performed by: INTERNAL MEDICINE

## 2017-11-28 PROCEDURE — 77030013140 HC MSK NEB VYRM -A

## 2017-11-28 PROCEDURE — 74177 CT ABD & PELVIS W/CONTRAST: CPT

## 2017-11-28 PROCEDURE — 85025 COMPLETE CBC W/AUTO DIFF WBC: CPT | Performed by: INTERNAL MEDICINE

## 2017-11-28 PROCEDURE — 85652 RBC SED RATE AUTOMATED: CPT | Performed by: INTERNAL MEDICINE

## 2017-11-28 PROCEDURE — 82962 GLUCOSE BLOOD TEST: CPT

## 2017-11-28 PROCEDURE — 36415 COLL VENOUS BLD VENIPUNCTURE: CPT | Performed by: INTERNAL MEDICINE

## 2017-11-28 PROCEDURE — 86140 C-REACTIVE PROTEIN: CPT | Performed by: INTERNAL MEDICINE

## 2017-11-28 PROCEDURE — 81270 JAK2 GENE: CPT | Performed by: INTERNAL MEDICINE

## 2017-11-28 PROCEDURE — 77010033678 HC OXYGEN DAILY

## 2017-11-28 RX ORDER — SODIUM CHLORIDE 0.9 % (FLUSH) 0.9 %
5-10 SYRINGE (ML) INJECTION AS NEEDED
Status: CANCELLED | OUTPATIENT
Start: 2017-11-28

## 2017-11-28 RX ORDER — NALOXONE HYDROCHLORIDE 0.4 MG/ML
0.4 INJECTION, SOLUTION INTRAMUSCULAR; INTRAVENOUS; SUBCUTANEOUS AS NEEDED
Status: CANCELLED | OUTPATIENT
Start: 2017-11-28

## 2017-11-28 RX ORDER — DILTIAZEM HYDROCHLORIDE 5 MG/ML
5 INJECTION INTRAVENOUS ONCE
Status: COMPLETED | OUTPATIENT
Start: 2017-11-28 | End: 2017-11-28

## 2017-11-28 RX ORDER — POLYETHYLENE GLYCOL 3350 17 G/17G
17 POWDER, FOR SOLUTION ORAL DAILY
Status: CANCELLED | OUTPATIENT
Start: 2017-11-28

## 2017-11-28 RX ORDER — FERROUS SULFATE, DRIED 160(50) MG
1 TABLET, EXTENDED RELEASE ORAL
Status: CANCELLED | OUTPATIENT
Start: 2017-11-29

## 2017-11-28 RX ORDER — FACIAL-BODY WIPES
10 EACH TOPICAL DAILY PRN
Status: CANCELLED | OUTPATIENT
Start: 2017-11-30

## 2017-11-28 RX ORDER — AMOXICILLIN 250 MG
1 CAPSULE ORAL 2 TIMES DAILY
Status: CANCELLED | OUTPATIENT
Start: 2017-11-28

## 2017-11-28 RX ORDER — SODIUM CHLORIDE 9 MG/ML
125 INJECTION, SOLUTION INTRAVENOUS CONTINUOUS
Status: CANCELLED | OUTPATIENT
Start: 2017-11-28 | End: 2017-11-29

## 2017-11-28 RX ORDER — OXYCODONE AND ACETAMINOPHEN 5; 325 MG/1; MG/1
2 TABLET ORAL
Status: DISCONTINUED | OUTPATIENT
Start: 2017-11-28 | End: 2017-12-01 | Stop reason: HOSPADM

## 2017-11-28 RX ORDER — SODIUM CHLORIDE 0.9 % (FLUSH) 0.9 %
5-10 SYRINGE (ML) INJECTION EVERY 8 HOURS
Status: CANCELLED | OUTPATIENT
Start: 2017-11-29

## 2017-11-28 RX ORDER — MAGNESIUM SULFATE HEPTAHYDRATE 40 MG/ML
2 INJECTION, SOLUTION INTRAVENOUS ONCE
Status: COMPLETED | OUTPATIENT
Start: 2017-11-28 | End: 2017-11-30

## 2017-11-28 RX ADMIN — LEVOFLOXACIN 750 MG: 750 TABLET, FILM COATED ORAL at 12:21

## 2017-11-28 RX ADMIN — INSULIN LISPRO 2 UNITS: 100 INJECTION, SOLUTION INTRAVENOUS; SUBCUTANEOUS at 00:00

## 2017-11-28 RX ADMIN — MAGNESIUM SULFATE HEPTAHYDRATE 2 G: 40 INJECTION, SOLUTION INTRAVENOUS at 09:08

## 2017-11-28 RX ADMIN — IOPAMIDOL 85 ML: 755 INJECTION, SOLUTION INTRAVENOUS at 15:08

## 2017-11-28 RX ADMIN — ENOXAPARIN SODIUM 40 MG: 40 INJECTION SUBCUTANEOUS at 09:08

## 2017-11-28 RX ADMIN — Medication 10 ML: at 22:00

## 2017-11-28 RX ADMIN — VANCOMYCIN HYDROCHLORIDE 1250 MG: 10 INJECTION, POWDER, LYOPHILIZED, FOR SOLUTION INTRAVENOUS at 23:34

## 2017-11-28 RX ADMIN — GUAIFENESIN 600 MG: 600 TABLET, EXTENDED RELEASE ORAL at 09:09

## 2017-11-28 RX ADMIN — DIATRIZOATE MEGLUMINE AND DIATRIZOATE SODIUM 30 ML: 660; 100 LIQUID ORAL; RECTAL at 13:04

## 2017-11-28 RX ADMIN — INSULIN LISPRO 2 UNITS: 100 INJECTION, SOLUTION INTRAVENOUS; SUBCUTANEOUS at 09:10

## 2017-11-28 RX ADMIN — Medication 10 ML: at 06:00

## 2017-11-28 RX ADMIN — DILTIAZEM HYDROCHLORIDE 5 MG: 5 INJECTION INTRAVENOUS at 00:45

## 2017-11-28 RX ADMIN — FERROUS SULFATE TAB 325 MG (65 MG ELEMENTAL FE) 325 MG: 325 (65 FE) TAB at 09:09

## 2017-11-28 RX ADMIN — Medication 10 ML: at 14:01

## 2017-11-28 RX ADMIN — ESCITALOPRAM OXALATE 20 MG: 10 TABLET ORAL at 09:09

## 2017-11-28 RX ADMIN — ATORVASTATIN CALCIUM 40 MG: 10 TABLET, FILM COATED ORAL at 09:10

## 2017-11-28 RX ADMIN — ASPIRIN 81 MG: 81 TABLET, COATED ORAL at 09:09

## 2017-11-28 RX ADMIN — ENOXAPARIN SODIUM 40 MG: 40 INJECTION SUBCUTANEOUS at 22:07

## 2017-11-28 RX ADMIN — ACETAMINOPHEN 650 MG: 325 TABLET ORAL at 09:11

## 2017-11-28 RX ADMIN — OMEGA-3-ACID ETHYL ESTERS 4000 MG: 900 CAPSULE, LIQUID FILLED ORAL at 09:12

## 2017-11-28 RX ADMIN — VANCOMYCIN HYDROCHLORIDE 2000 MG: 10 INJECTION, POWDER, LYOPHILIZED, FOR SOLUTION INTRAVENOUS at 09:07

## 2017-11-28 RX ADMIN — GUAIFENESIN 600 MG: 600 TABLET, EXTENDED RELEASE ORAL at 22:05

## 2017-11-28 NOTE — PROGRESS NOTES
0715  Bedside and Verbal shift change report given to Noreen Flores (oncoming nurse) by Ayaz Granger (offgoing nurse). Report included the following information SBAR, Kardex, Procedure Summary, Intake/Output, Recent Results and Cardiac Rhythm NSR. Patient sleeping. Will continue to monitor. 36  Daughter leaving, another daughter and  arrived. Discussed SIRS criteria, and septic arthritis. Patient resting quietly. Tylenol given for pain 4/10. Updated patient communication board. 4421  Patient rounding. IV antibiotics almost done. 1140  Patient rounding. Daughter is concerned with mother's weight increase. She has gained \"10-15 pounds every month for a while but doesn't eat anything. She (patient) told me she has a tumor. What is being done about that? \"  Advised that I will inquire. 1205  Additional family present. Patient resting quietly. Placed call to Dr. Sharon Brown regarding patient diet order of NPO and abdominal concerns. Spoke with Dr. Sharon Brown. Continue NPO for additional testing. He will place orders. 1258  Received call from Jemima Horton re CT scan. Contrast is PO. Orders have been placed. Patient needs to drink one cup every 20 minutes. Call when medication administration is complete. Ex 3182.    1402  Administered third cup of PO contrast.  Placed call. Spoke with Torrie Mcallister. Advised that patient has completed PO contrast.  1521  Patient off floor for CT of abdomen and pelvis. 32 61 16  Patient returned to floor. Bedside and Verbal shift change report given to Sharon Orellana (oncoming nurse) by Noreen Flores (offgoing nurse). Report included the following information SBAR, Kardex, Intake/Output, Recent Results and Cardiac Rhythm NSR.

## 2017-11-28 NOTE — DIABETES MGMT
Progress Note     Chart reviewed for elevated blood glucose ( > 180 mg/dL x 2 in the past 24 hours) . Recommendations/ Comments: If glucose continues to be > 180 please consider adding Lantus 8 units (0.1 units/kg/day) to aid in managing fasting glucose while inpatient. Fasting glucose today: 157 mg/dL (per am POCT Glucose). Required 6 units of correction since correction scale was started 11/27 at 1630. Inpatient medications for glucose management:  1. Correction Scale: Lispro (Humalog) Normal Sensitivity scale to cover for glucose > 139 mg/dL before meals and for glucose >199 at bedtime      POC Glucose last 24hrs:   Lab Results   Component Value Date/Time     (H) 11/28/2017 04:15 AM    GLUCPOC 157 (H) 11/28/2017 07:42 AM    GLUCPOC 225 (H) 11/27/2017 11:54 PM    GLUCPOC 126 (H) 11/27/2017 08:53 PM        Estimated Creatinine Clearance: 60.4 mL/min (based on Cr of 0.74). Diet order:   Active Orders   Diet    DIET NPO      PO intake: Patient Vitals for the past 72 hrs:   % Diet Eaten   11/27/17 1850 0 %       History of Diabetes:   Divya Slaughter is a 69 y.o. female with a past medical history significant for DM per Dr. Maria Elena Espinoza MD's H&P dated 11/27/2017. Prior to admission medications for glucose management: per past medical records  -Metformin 1000mg with breakfast     A1C:   Lab Results   Component Value Date/Time    Hemoglobin A1c 7.9 11/27/2017 05:28 PM    Hemoglobin A1c 6.3 08/05/2016 06:41 AM       Reference range*:  Increased risk for diabetes: 5.7 - 6.4%  Diabetes: >6.4%  Glycemic control for adults with diabetes: <7.0 %    *YVONNE SHRESTHA (2014). Diagnosis and classification of diabetes mellitus. Diabetes care, 37, S81. Thank you. Joe Linn MPH, RN, BSN, [address]   [phone]    -For most hospitalized persons with hyperglycemia in the intensive care unit (ICU), a glucose range of 140 to 180 mg/dL is recommended, provided this target can be safely achieved. *  - For general medicine and surgery patients in non-ICU settings, a premeal glucose target <140 mg/dL and a random blood glucose <180 mg/dL are recommended. *    ELIZABETH Oleary Ra, Taco Camacho., Edward Auguste., ... & Jeri Cervantes (0298). AMERICAN ASSOCIATION OF CLINICAL ENDOCRINOLOGISTS AND AMERICAN COLLEGE OF ENDOCRINOLOGY-CLINICAL PRACTICE GUIDELINES FOR DEVELOPING A DIABETES MELLITUS COMPREHENSIVE CARE PLAN-2015-EXECUTIVE SUMMARY: Complete guidelines are available at https://www. aace. com/publications/guidelines. Endocrine Practice, 21(4), [id number].

## 2017-11-28 NOTE — CONSULTS
Orthopedic History and Physical     Patient: Luigi Scott MRN: [id number]  SSN: [id number]    YOB: 1940  Age: 69 y.o. Sex: female          Subjective:     Patient is a 69 y.o.  female with hx of type 2 DM, depression, XOL who complains of left shoulder pain x past 2-3 days. Pt denies trauma or injury. Pain with any  movement, complaints of swelling. Presented to ED with significant leukocytosis, elevated sed rate, CRP, febrile, meeting criteria for SIRS. Radiographs of left shoulder without fracture. Ortho consulted by hospitalist later this afternoon. Pt was given a dinner tray at 1600. Pt denies abdominal pain. Pt with productive cough x several years. Dx by hospitalist with bronchitis. UA negative. CT chest negative. CT abd/ pelvis ordered for tomorrow. Hematology evaluated patient, further labs ordered. No clear source as of now. Patient Active Problem List    Diagnosis Date Noted    Obesity, morbid ([address] 11/27/2017    Sepsis ([address] 11/27/2017    Lactic acidosis 11/27/2017    Hyperglycemia due to type 2 diabetes mellitus ([address] 08/04/2016    Depression 12/21/2013    Hyperlipidemia 12/20/2013     Past Medical History:   Diagnosis Date    Hypercholesterolemia     Hyperglycemia due to type 2 diabetes mellitus ([address] 8/4/2016    Ill-defined condition     cellulitis    Major depression     depression    Pneumonia     2011 or so    Transient ischemic attack       Past Surgical History:   Procedure Laterality Date    COLONOSCOPY  6/24/2016         COLONOSCOPY N/A 6/24/2016    COLONOSCOPY performed by Glenny Wells MD at [address] [place] [address] N/A 8/26/2016    SIGMOIDOSCOPY FLEXIBLE performed by Glenny Wells MD at [place] [address] [facility] APPENDECTOMY      UPPER GI ENDOSCOPY,DIAGNOSIS  8/26/2016           Prior to Admission medications    Medication Sig Start Date End Date Taking?  Authorizing Provider   omega-3 acid ethyl esters (LOVAZA) 1 gram capsule Take 4 g by mouth daily (with breakfast). Yes Historical Provider   ferrous sulfate 325 mg (65 mg iron) tablet Take 1 Tab by mouth daily (with breakfast). 8/10/16  Yes Nati Fierro MD   aspirin delayed-release 81 mg tablet Take 81 mg by mouth daily. Yes Historical Provider   atorvastatin (LIPITOR) 40 mg tablet Take 40 mg by mouth daily. Yes Historical Provider   escitalopram oxalate (LEXAPRO) 20 mg tablet Take 20 mg by mouth daily. Yes Historical Provider   metFORMIN (GLUCOPHAGE) 500 mg tablet Take 1,000 mg by mouth daily (with breakfast). Yes Historical Provider   losartan (COZAAR) 25 mg tablet Take 25 mg by mouth daily.    Yes Historical Provider     Current Facility-Administered Medications   Medication Dose Route Frequency    sodium chloride (NS) flush 5-10 mL  5-10 mL IntraVENous PRN    0.9% sodium chloride infusion  100 mL/hr IntraVENous CONTINUOUS    sodium chloride (NS) flush 5-10 mL  5-10 mL IntraVENous Q8H    sodium chloride (NS) flush 5-10 mL  5-10 mL IntraVENous PRN    acetaminophen (TYLENOL) tablet 650 mg  650 mg Oral Q4H PRN    naloxone (NARCAN) injection 0.4 mg  0.4 mg IntraVENous PRN    ondansetron (ZOFRAN) injection 4 mg  4 mg IntraVENous Q4H PRN    enoxaparin (LOVENOX) injection 40 mg  40 mg SubCUTAneous Q12H    insulin lispro (HUMALOG) injection   SubCUTAneous AC&HS    glucose chewable tablet 16 g  4 Tab Oral PRN    dextrose (D50W) injection syrg 12.5-25 g  12.5-25 g IntraVENous PRN    glucagon (GLUCAGEN) injection 1 mg  1 mg IntraMUSCular PRN    [START ON 11/28/2017] aspirin delayed-release tablet 81 mg  81 mg Oral DAILY    [START ON 11/28/2017] atorvastatin (LIPITOR) tablet 40 mg  40 mg Oral DAILY    [START ON 11/28/2017] escitalopram oxalate (LEXAPRO) tablet 20 mg  20 mg Oral DAILY    [START ON 11/28/2017] ferrous sulfate tablet 325 mg  325 mg Oral DAILY WITH BREAKFAST    [START ON 11/28/2017] omega-3 acid ethyl esters (LOVAZA) capsule 4,000 mg  4 g Oral DAILY WITH BREAKFAST    guaiFENesin ER (MUCINEX) tablet 600 mg  600 mg Oral Q12H    [START ON 2017] levoFLOXacin (LEVAQUIN) tablet 750 mg  750 mg Oral Q24H      No Known Allergies   Social History   Substance Use Topics    Smoking status: Former Smoker    Smokeless tobacco: Not on file    Alcohol use No      No family history on file. Review of Systems  A comprehensive review of systems was negative except for that written in the HPI. Objective:     No data found. Temp (24hrs), Av.6 °F (37 °C), Min:98.2 °F (36.8 °C), Max:99.3 °F (37.4 °C)      EXAM:   Physical Exam:   Patient appears generally well, mood and affect are appropriate and pleasant. HEENT: Normocephalic, atraumatic. Neck Exam: Supple, No JVD or carotid bruits. Lung Exam: Clear to auscultation, even breath sounds. Productive cough. Extremities: Left shoulder with erythema. Severe pain with minimal ROM. Pain with palpation around left shoulder anterior/ lateral. Unable to assess strength due lack of ROM and pain with movement. Vascular: 2+ dorsalis pedis pulses bilaterally. Imaging Review    Imaging Review: INDICATION: Left shoulder pain.     Exam: Three views of the left shoulder.     FINDINGS: There is no acute fracture or dislocation. The osseous structures are  diffusely demineralized. There are several ossific densities lateral to the left  humeral head, likely representing loose bodies.     IMPRESSION  IMPRESSION: No acute fracture or dislocation.     Labs:   Recent Results (from the past 12 hour(s))   URINALYSIS W/ REFLEX CULTURE    Collection Time: 17 10:28 AM   Result Value Ref Range    Color YELLOW/STRAW      Appearance CLEAR CLEAR      Specific gravity 1.010 1.003 - 1.030      pH (UA) 5.5 5.0 - 8.0      Protein NEGATIVE  NEG mg/dL    Glucose NEGATIVE  NEG mg/dL    Ketone NEGATIVE  NEG mg/dL    Bilirubin NEGATIVE  NEG      Blood NEGATIVE  NEG      Urobilinogen 1.0 0.2 - 1.0 EU/dL    Nitrites NEGATIVE  NEG      Leukocyte Esterase NEGATIVE  NEG      WBC 0-4 0 - 4 /hpf    RBC 0-5 0 - 5 /hpf    Epithelial cells FEW FEW /lpf    Bacteria NEGATIVE  NEG /hpf    UA:UC IF INDICATED CULTURE NOT INDICATED BY UA RESULT CNI      Granular cast 0-2 (A) NEG /lpf   LACTIC ACID    Collection Time: 11/27/17 10:28 AM   Result Value Ref Range    Lactic acid 2.7 (HH) 0.4 - 2.0 MMOL/L   GLUCOSE, POC    Collection Time: 11/27/17  1:10 PM   Result Value Ref Range    Glucose (POC) 129 (H) 65 - 100 mg/dL    Performed by [employer] ACID    Collection Time: 11/27/17  1:46 PM   Result Value Ref Range    Lactic acid 1.7 0.4 - 2.0 MMOL/L   GLUCOSE, POC    Collection Time: 11/27/17  5:02 PM   Result Value Ref Range    Glucose (POC) 193 (H) 65 - 100 mg/dL    Performed by [employer]    SED RATE (ESR)    Collection Time: 11/27/17  5:28 PM   Result Value Ref Range    Sed rate, automated 68 (H) 0 - 30 mm/hr   HEMOGLOBIN A1C WITH EAG    Collection Time: 11/27/17  5:28 PM   Result Value Ref Range    Hemoglobin A1c 7.9 (H) 4.2 - 6.3 %    Est. average glucose 180 mg/dL   NT-PRO BNP    Collection Time: 11/27/17  5:28 PM   Result Value Ref Range    NT pro- 0 - 450 PG/ML   C REACTIVE PROTEIN, QT    Collection Time: 11/27/17  5:28 PM   Result Value Ref Range    C-Reactive protein 15.05 (H) <0.60 mg/dL       Assessment:     Patient Active Problem List    Diagnosis Date Noted    Obesity, morbid ([facility] [address] [address] 11/27/2017    Sepsis ([facility] 11/27/2017    Lactic acidosis 11/27/2017    Hyperglycemia due to type 2 diabetes mellitus ([facility] [address] 08/04/2016    Depression 12/21/2013    Hyperlipidemia 12/20/2013         Plan:   Acute onset left shoulder pain without injury/ trauma with elevated WBC, Sed rate, CRP, meeting SIRS criteria. Proceeded with left shoulder arthrocentesis   After verbal consent, proceeded with thorough cleansing of left anterior shoulder with betadine and alcohol. Injected 2 cc of Lidocaine 2% for local anesthetic.  With use of 18 gauge needle, aspirated 0.5 cc of thick yellow aspirate mixed with blood. Sent to lab for gram stain, cell count, crystals and cultures. Pt tolerated procedure well. Discussed case with Dr. Mirna Marcelo. Possible need for left shoulder I and D for septic arthritis. Await cell count for now. Remain NPO  Discussed in detail with patient and daughter about nature of condition and treatment options. Jah Pena NP  Orthopaedic Nurse Practitioner    [address] (P:  [phone])    Addendum:   WBC in aspirate >70 K. Dr. Mirna Marcelo to proceed to OR tonight for I and D left shoulder.

## 2017-11-28 NOTE — CONSULTS
[facility]   [address] [facility], [facility]   [facility]       Name:  Jyoti Moya   MR#:  [id number]   :  1940   Account #:  [de-identified]    Date of Consultation:  2017   Date of Adm:  2017       REASON FOR CONSULTATION: Left shoulder pain. CHIEF COMPLAINT: Left shoulder pain. CONSULTING PHYSICIAN: Dr. Astrid Meyer: This is a 71-year-old female   admitted to the [facility] for sepsis with an elevated white   blood cell count and lactic acidosis. She has a past medical history of   diabetes as well as depression, and she was admitted for SIRF criteria. The patient complained of a cough, shoulder pain and she denied   fever at home, but has had a white count elevated up to 26,000 here at   the hospital. She also has an elevated sedimentation rate and CRP. She says she has pain in her left shoulder and points of the anterior   aspect of her shoulder and has pain initiating movement and with any   passive range of motion. Aspiration of her LEFT  shoulder performed earlier   today by Kevin Sinclair was reported as bloody and purulent appearing. The cell count showed 70,000 WBC and the gram stain is pending. PAST MEDICAL HISTORY: Hypercholesterolemia, type 2 diabetes,   depression, history of pneumonia and a TIA. PAST SURGICAL HISTORY: Colonoscopy, appendectomy and GI   endoscopy. SOCIAL HISTORY: Former smoker, does not use alcohol. FAMILY HISTORY: Hypertension. ALLERGIES: NO KNOWN DRUG ALLERGIES. HOME MEDICATIONS:   1. Omega-3 acid. 2. Lovaza. 3. Ferrous sulfate. 4. Aspirin. 5. Lipitor. 6. Lexapro. 7. Metformin. 8. Cozaar. REVIEW OF SYSTEMS:   Subjective fevers, malaise. Negative for chest pain, shortness of   breath. She does report nausea and she vomited. She denies any   abdominal pain. She reports very bad left shoulder pain.      PHYSICAL EXAMINATION GENERAL: This is an elderly  female in no acute distress. She is alert, cooperative on examination. MUSCULOSKELETAL: Focused musculoskeletal examination of the   left upper extremity, she has exquisite pain and tenderness with any   type of range of motion of the shoulder in forward flexion, abduction,   internal and external rotation. She has tenderness to palpation at the   shoulder. She has positive gross motor and sensation in median,   radial, ulnar nerve distributions. Her arm compartments are soft and   easily compressible. X-RAY EVALUATION: Three views of the left shoulder are negative for   fracture or dislocation. There are no degenerative changes in the   glenohumeral joint or in the acromioclavicular joint. ASSESSMENT AND PLAN: A 80-year-old female with left shoulder   septic arthritis. Laboratory results show an elevated white blood cell count to 26,   a sedimentation rate of 68, and a C-reactive protein of 15. The cell count showed 70,000 . The aspirate from   her shoulder was purulent and bloody material.     Given the patient's septic criteria and also lack of evidence for any   other infectious process given a clean urinalysis and absence of   abdominal pain, and no evidence of pneumonia, her only other likely   nidus of infection is her shoulder. I discussed the risks and benefits of   continued conservative management versus surgical intervention with   the patient at length. I recommended arthroscopic irrigation and   debridement of her shoulder septic arthritis. The risks of surgery were    discussed at length, and all questions were answered to the best of my   ability. Surgery risks include, but are not limited to complications of   anesthesia including death, pain, bleeding, infection, damage to   surrounding structures, recurrence of her infection, and need for further   surgery.  The patient verbalized understanding and elected to proceed   with surgical intervention. She has already been started on IV antibiotics with Levaquin and Cefepime.     Plan for OR tonight for LEFT shoulder arthroscopic Irrigation  NPO           MD MARGARITA Lyons / EASOTN   D:  11/27/2017   21:48   T:  11/27/2017   22:21   Job #:  [id number]

## 2017-11-28 NOTE — PROGRESS NOTES
In morning rounds with attending,it was discussed that pt will likely be stable for discharge tomorrow. Once PT & OT have evaluated pt,I will discuss any identified  needs with pt and attending. Thank you.   Washington Mckeon  Team B  [phone]

## 2017-11-28 NOTE — BRIEF OP NOTE
BRIEF OPERATIVE NOTE    Date of Procedure: 11/27/2017   Preoperative Diagnosis: SEPTIC SHOULDER  Postoperative Diagnosis: SEPTIC SHOULDER    Procedure(s):  SHOULDER ARTHROSCOPIC WASHOUT  Surgeon(s) and Role:     Betsey Ortiz MD - Primary         Assistant Staff:       Surgical Staff:  Circ-1: Mesha Cervantes RN  Circ-2: Sybil Berg RN  Scrub Tech-1: [facility] Staff: Ilana Wilson RN  Event Time In   Incision Start 2232   Incision Close 2317     Anesthesia: General   Estimated Blood Loss: 5cc  Specimens: * No specimens in log *   Findings: purulent fluid in LEFT shoulder joint   Complications: none  Implants: * No implants in log *

## 2017-11-28 NOTE — ANESTHESIA POSTPROCEDURE EVALUATION
Post-Anesthesia Evaluation and Assessment    Patient: Trevor Cortes MRN: [id number]  SSN: [id number]    YOB: 1940  Age: 69 y.o. Sex: female       Cardiovascular Function/Vital Signs  Visit Vitals    /62    Pulse (!) 110    Temp 37 °C (98.6 °F)    Resp 27    Ht 4' 8\" (1.422 m)    Wt 82.1 kg (181 lb)    SpO2 91%    BMI 40.58 kg/m2       Patient is status post general anesthesia for Procedure(s):  SHOULDER ARTHROSCOPIC WASHOUT. Nausea/Vomiting: None    Postoperative hydration reviewed and adequate. Pain:  Pain Scale 1: Numeric (0 - 10) (11/27/17 2352)  Pain Intensity 1: 0 (11/27/17 2352)   Managed    Neurological Status:   Neuro (WDL): Exceptions to WDL (11/27/17 2352)  Neuro  Neurologic State: Drowsy (11/27/17 2352)  Orientation Level: Appropriate for age (11/27/17 2352)  Cognition: Appropriate for age attention/concentration; Follows commands (11/27/17 2352)  Speech: Clear (11/27/17 2352)  LUE Motor Response: Purposeful (11/27/17 2352)  LLE Motor Response: Purposeful (11/27/17 2352)  RUE Motor Response: Purposeful (11/27/17 2352)  RLE Motor Response: Purposeful (11/27/17 2352)   At baseline    Mental Status and Level of Consciousness: Arousable    Pulmonary Status:   O2 Device: Oxygen mask (11/27/17 2352)   Adequate oxygenation and airway patent    Complications related to anesthesia: None    Post-anesthesia assessment completed.  No concerns    Signed By: Chichi Meza MD     November 28, 2017

## 2017-11-28 NOTE — ANESTHESIA PREPROCEDURE EVALUATION
Anesthetic History   No history of anesthetic complications            Review of Systems / Medical History  Patient summary reviewed, nursing notes reviewed and pertinent labs reviewed    Pulmonary  Within defined limits              Comments: Chronic bronchitis, on home O2 while sleeping   Neuro/Psych         TIA and psychiatric history     Cardiovascular              Hyperlipidemia    Exercise tolerance: >4 METS     GI/Hepatic/Renal  Within defined limits              Endo/Other    Diabetes: type 2    Obesity     Other Findings              Physical Exam    Airway  Mallampati: IV  TM Distance: 4 - 6 cm  Neck ROM: normal range of motion   Mouth opening: Normal     Cardiovascular  Regular rate and rhythm,  S1 and S2 normal,  no murmur, click, rub, or gallop             Dental    Dentition: Lower dentition intact and Upper dentition intact     Pulmonary  Breath sounds clear to auscultation               Abdominal         Other Findings            Anesthetic Plan    ASA: 3  Anesthesia type: general          Induction: Intravenous  Anesthetic plan and risks discussed with: Patient

## 2017-11-28 NOTE — ROUTINE PROCESS
Bedside and Verbal shift change report given to Luiza Zendejas (oncoming nurse) by Olya Salcedo RN (offgoing nurse). Report included the following information SBAR, Kardex, Intake/Output, MAR, Accordion and Recent Results. 2209- Pt presents with one episode of emesis. 36- Notified per PACU nurse that pt will be seen by Ortho (MD Jay Berg) for a shoulder procedure. Told by Magnus Harris NP (ortho) that pt will remain NPO at this time. Primary Nurse Roberto Luciano and Gene Flores RN performed a dual skin assessment on this patient No impairment noted  Bay score is 20    2111- Pt has been transported down to PACU at this time. 0130- Pt arrives back to unit from PACU at this time    Bedside and Verbal shift change report given to Janneth Pascual RN (oncoming nurse) by Anthony Mckeon RN (offgoing nurse). Report included the following information SBAR, Kardex, Intake/Output, MAR, Accordion and Recent Results.

## 2017-11-28 NOTE — PROGRESS NOTES
Subjective:    Yani Garrett is a 69 y.o. female who is POD#1 s/p LEFT shoulder arthroscopic irrigation for septic arthritis    Major Events:. Pain controlled    Objective:    Vital signs in last 24 hours:    Temp:  [98.2 °F (36.8 °C)-99.3 °F (37.4 °C)]   Pulse (Heart Rate):  []   BP: (111-147)/(51-99)   Resp Rate:  [18-29]   O2 Sat (%):  [90 %-95 %]   Weight:  [81.6 kg (180 lb)-82.1 kg (181 lb)]     Temp (24hrs), Av.5 °F (36.9 °C), Min:98.2 °F (36.8 °C), Max:99.3 °F (37.4 °C)      Labs:    Lab Results   Component Value Date/Time    WBC 19.5 2017 04:15 AM    HGB 8.8 2017 04:15 AM    HCT 27.5 2017 04:15 AM    PLATELET 021  04:15 AM       Lab Results   Component Value Date/Time    Sodium 140 2017 04:15 AM    Potassium 3.9 2017 04:15 AM    Chloride 106 2017 04:15 AM    CO2 24 2017 04:15 AM    BUN 8 2017 04:15 AM       Physical Exam:    General: alert, cooperative, in NAD  Nonlabored resp    LEFT upper extremity    Dressing: mild drainage      Motor: + gross motor m/r/u    Sensory: SILT in fingers    Vascular: Brisk capillary refill in fingers    Assessment/Plan:    · Pain management: as written    · DVT Prophylaxis: per primary team    · PT/OT: pendulum exercises to tolerance with PT/OT  Cont IV abx, culture results pending    · Dispo: pending PT evaluation    F/u: [facility]ia [facility] Office 2-3 weeks Roger 7.  [facility], MD

## 2017-11-28 NOTE — OP NOTES
[address], [address]   OP NOTE       Name:  Nissa Flores   MR#:  [id number]   :  1940   Account #:  [de-identified]    Surgery Date:  2017   Date of Adm:  2017       PREOPERATIVE DIAGNOSIS: Left shoulder septic arthritis. POSTOPERATIVE DIAGNOSIS: Left shoulder septic arthritis. PROCEDURE PERFORMED: Left shoulder arthroscopic irrigation of   left septic arthritis of the shoulder. ANESTHESIA: General.     ESTIMATED BLOOD LOSS: 5 mL. SURGEON: Lorin Sánchez. MD Alden     ASSISTANTS: None. FINDINGS: Purulent material in the left shoulder joint. SPECIMENS REMOVED: None. IMPLANTS: None. INDICATIONS FOR PROCEDURE: This is a 31-year-old female who   was admitted to the hospital for sepsis criteria. She had a high white   blood cell count of 26 and sedimentation rate of 69 and ESR of 15. Aspiration of her left shoulder revealed 70,000 white blood cells. She   had no other nidus of infection that was obtained on extensive workup   and she met criteria for left shoulder septic arthritis. I discussed the   risks and benefits of surgical versus conservative treatment at length   with the patient and recommended arthroscopic irrigation and   continued IV antibiotics. The patient verbalized understanding and   elected to proceed with surgical intervention. DESCRIPTION OF PROCEDURE: The patient was taken back to the   operating room and placed successfully under general anesthesia and   then placed in the beach chair position. The left upper extremity was   prepped and draped in the usual sterile fashion and a preoperative   timeout was called, again the correct the patient, extremity, and   operation were all identified. All parties were in agreement and we   proceed with the operation. A spinal needle was used from the posterior position, aiming towards   the coracoid and advanced into the shoulder joint. The joint was then   injected with 30 mL of normal saline and there was return of fluid. A   small stab incision was made in the posterior aspect of the shoulder   and the trocar was introduced into the shoulder joint. The trocar was   then removed and the camera was advanced in the joint and   confirmation of the camera and the joint was identified and an   arthroscopic picture was taken showing the glenoid and the humeral   head. There was purulent material in the joint and approximately 6   liters of normal saline was irrigated throughout the joint. After irrigation,   the camera was removed, and the shoulder and arm compartments   were soft and easily compressible. A stab incision was closed with a   3.0 Vicryl suture and a sterile dressing was applied. The patient was   awakened from general anesthesia and taken to the recovery room in   hemodynamically stable condition. There were no complications   immediately known after surgery. All lap and sharp counts were correct at the end of the case. POSTOPERATIVE CARE: The patient will be admitted back to the   floor. I will continue with IV antibiotics. She can start physical therapy   and occupational therapy for range of motion exercises to tolerance   with no restrictions in the left upper extremity. She will continue on IV   antibiotics and the culture and Gram stain from her shoulder aspiration   are pending.     FOLLOWUP: The patient will follow up with me in my office at the   [address] [place] [address] in 2-3 weeks after discharge   from the hospital.        MD MARGARITA Spence / ISAURO   D:  11/27/2017   23:24   T:  11/28/2017   10:44   Job #:  [id number]

## 2017-11-28 NOTE — PROGRESS NOTES
[facility] 79  [address] [facility], [address] [place] [address]  [phone]      Medical Progress Note      NAME: Cass Villanueva   :  1940  MRM:  [id number]    Date/Time: 2017  7:24 AM       Assessment and Plan:   1. Septic arthritis. S/p I&D on . Broaden ABx and follow cultures. Leukocytosis has improved. Orthopedics evaluation appreciated      2. Leukocytosis - likely secondary to above. Improving. Continue ABx. Evaluated by hematology -check peripheral smear     3. Hyperlipidemia (2013). On statin      4. Depression (2013)-continue Lexapro     5. Hyperglycemia due to type 2 diabetes mellitus ([facility] (2016). A1c is 7.9. Cover with SSI. Metformin is on hold. 6.  Obesity, morbid ([facility] (2017)-counseled on weight loss on admission    7. Lactic acidosis (2017) - likely secondary to #1, resolved. Subjective:     Chief Complaint:  Follow up pt who was admitted with SIRS/ septic arthritis. Feels better     ROS:  (bold if positive, if negative)      Tolerating PT  Tolerating Diet        Objective:     Last 24hrs VS reviewed since prior progress note.  Most recent are:    Visit Vitals    BP (P) 113/69 (BP 1 Location: Right arm, BP Patient Position: At rest)    Pulse (!) (P) 105    Temp (P) 98.4 °F (36.9 °C)    Resp (P) 22    Ht 4' 8\" (1.422 m)    Wt 82.1 kg (181 lb)    SpO2 (P) 93%    BMI 40.58 kg/m2     SpO2 Readings from Last 6 Encounters:   17 (P) 93%   17 95%   16 94%   08/10/16 94%   16 96%   06/10/16 96%    O2 Flow Rate (L/min): (P) 3 l/min     Intake/Output Summary (Last 24 hours) at 17 0724  Last data filed at 17 0139   Gross per 24 hour   Intake             2500 ml   Output               25 ml   Net             2475 ml        Physical Exam:    Gen:  obese, in no acute distress  HEENT:  Pink conjunctivae, PERRL, hearing intact to voice, moist mucous membranes  Neck:  Supple, without masses, thyroid non-tender  Resp:  No accessory muscle use, clear breath sounds without wheezes rales or rhonchi  Card:  No murmurs, normal S1, S2 without thrills, bruits or peripheral edema  Abd:  Soft, non-tender, non-distended, normoactive bowel sounds are present, no palpable organomegaly and no detectable hernias  Lymph:  No cervical or inguinal adenopathy  Musc:  No cyanosis or clubbing  Skin:  No rashes or ulcers, skin turgor is good  Neuro:  Cranial nerves are grossly intact, no focal motor weakness, follows commands appropriately  Psych:  Good insight, oriented to person, place and time, alert  __________________________________________________________________  Medications Reviewed: (see below)  Medications:     Current Facility-Administered Medications   Medication Dose Route Frequency    magnesium sulfate 2 g/50 ml IVPB (premix or compounded)  2 g IntraVENous ONCE    sodium chloride (NS) flush 5-10 mL  5-10 mL IntraVENous PRN    0.9% sodium chloride infusion  100 mL/hr IntraVENous CONTINUOUS    sodium chloride (NS) flush 5-10 mL  5-10 mL IntraVENous Q8H    sodium chloride (NS) flush 5-10 mL  5-10 mL IntraVENous PRN    acetaminophen (TYLENOL) tablet 650 mg  650 mg Oral Q4H PRN    naloxone (NARCAN) injection 0.4 mg  0.4 mg IntraVENous PRN    ondansetron (ZOFRAN) injection 4 mg  4 mg IntraVENous Q4H PRN    enoxaparin (LOVENOX) injection 40 mg  40 mg SubCUTAneous Q12H    insulin lispro (HUMALOG) injection   SubCUTAneous AC&HS    glucose chewable tablet 16 g  4 Tab Oral PRN    dextrose (D50W) injection syrg 12.5-25 g  12.5-25 g IntraVENous PRN    glucagon (GLUCAGEN) injection 1 mg  1 mg IntraMUSCular PRN    aspirin delayed-release tablet 81 mg  81 mg Oral DAILY    atorvastatin (LIPITOR) tablet 40 mg  40 mg Oral DAILY    escitalopram oxalate (LEXAPRO) tablet 20 mg  20 mg Oral DAILY    ferrous sulfate tablet 325 mg  325 mg Oral DAILY WITH BREAKFAST    omega-3 acid ethyl esters (LOVAZA) capsule 4,000 mg  4 g Oral DAILY WITH BREAKFAST    guaiFENesin ER (MUCINEX) tablet 600 mg  600 mg Oral Q12H    levoFLOXacin (LEVAQUIN) tablet 750 mg  750 mg Oral Q24H        Lab Data Reviewed: (see below)  Lab Review:     Recent Labs      11/28/17 0415 11/27/17   0850   WBC  19.5*  26.9*   HGB  8.8*  10.8*   HCT  27.5*  34.6*   PLT  271  333     Recent Labs      11/28/17 0415  11/27/17   0850   NA  140  138   K  3.9  3.9   CL  106  101   CO2  24  26   GLU  187*  221*   BUN  8  13   CREA  0.74  0.87   CA  7.9*  9.0   MG  1.5*   --    ALB   --   3.0*   TBILI   --   0.5   SGOT   --   26   ALT   --   39     Lab Results   Component Value Date/Time    Glucose (POC) 225 11/27/2017 11:54 PM    Glucose (POC) 126 11/27/2017 08:53 PM    Glucose (POC) 193 11/27/2017 05:02 PM    Glucose (POC) 129 11/27/2017 01:10 PM    Glucose (POC) 137 08/27/2016 11:22 AM     No results for input(s): PH, PCO2, PO2, HCO3, FIO2 in the last 72 hours. No results for input(s): INR in the last 72 hours. No lab exists for component: INREXT  All Micro Results     Procedure Component Value Units Date/Time    CULTURE, BODY FLUID Angeline Large STAIN [[id number]] Collected:  11/27/17 1919    Order Status:  Completed Specimen:  Joint Fluid Updated:  11/27/17 2245     Special Requests: NO SPECIAL REQUESTS        GRAM STAIN RARE WBCS SEEN         NO ORGANISMS SEEN        Culture result: PENDING    CULTURE, RESPIRATORY/SPUTUM/BRONCH Angeline Large STAIN [[id number]]     Order Status:  Sent Specimen:  Sputum from Sputum     CULTURE, BLOOD [[id number]] Collected:  11/27/17 1143    Order Status:  Completed Specimen:  Blood from Blood Updated:  11/27/17 1226          I have reviewed notes of prior 24hr. Other pertinent lab:       Total time spent with patient: [address] discussed with: Patient, Nursing Staff and >50% of time spent in counseling and coordination of care    Discussed:  Care Plan    Prophylaxis:  Lovenox    Disposition:  Home w/Family           ___________________________________________________    Attending Physician: Sean Russell MD

## 2017-11-28 NOTE — PROGRESS NOTES
Occupational Therapy Note:  Orders acknowledged, chart reviewed, and spoke with nursing. Patient is currently off the floor for CT and unavailable. Will continue to follow.   Nilay Raymond, OTR/L

## 2017-11-28 NOTE — PROGRESS NOTES
[facility] at [address], [address] [place] Darryle Chard: [phone]  F: [phone]      Reason for Visit:   Richard Muro is a 69 y.o. female who is seen in consultation at the request of Dr. Alfredo Croft for evaluation of leukocytosis. History of Present Illness:   Per pt, presented to the ED with c/o cough, arm pain and multiple other complaints. Denies fevers/chills. No N/V/D or ab pain. No skin breakdown. No sick contacts. Pt was noted to have a leukocytosis and elevated lactic acid. CT chest and neck were unremarkable. In review of CC, leukocytosis goes back 3 years. Daughter states that she is gaining weight and abdominal girth. Interval History:     States has some pain to left shoulder area. Denies any abd pain; denies feeling of bloating. Unaware of any hx of abnormal blood work. Hungry; waiting for dinner.  at bedside. Past Medical History:   Diagnosis Date    Hypercholesterolemia     Hyperglycemia due to type 2 diabetes mellitus ([facility] [address] 8/4/2016    Ill-defined condition     cellulitis    Major depression     depression    Pneumonia     2011 or so    Transient ischemic attack       Past Surgical History:   Procedure Laterality Date    COLONOSCOPY  6/24/2016         COLONOSCOPY N/A 6/24/2016    COLONOSCOPY performed by Vijay Arechiga MD at [address] N/A 8/26/2016    SIGMOIDOSCOPY FLEXIBLE performed by Vijay Arechiga MD at [facility] HX APPENDECTOMY      UPPER GI ENDOSCOPY,DIAGNOSIS  8/26/2016           Social History   Substance Use Topics    Smoking status: Former Smoker    Smokeless tobacco: Not on file    Alcohol use No      History reviewed. No pertinent family history.   Current Facility-Administered Medications   Medication Dose Route Frequency    oxyCODONE-acetaminophen (PERCOCET) 5-325 mg per tablet 2 Tab  2 Tab Oral Q6H PRN    vancomycin (VANCOCIN) 1,250 mg in 0.9% sodium chloride 250 mL IVPB  1,250 mg IntraVENous Q12H    iopamidol (ISOVUE-370) 76 % injection 100 mL  100 mL IntraVENous RAD ONCE    sodium chloride (NS) flush 5-10 mL  5-10 mL IntraVENous PRN    sodium chloride (NS) flush 5-10 mL  5-10 mL IntraVENous Q8H    sodium chloride (NS) flush 5-10 mL  5-10 mL IntraVENous PRN    acetaminophen (TYLENOL) tablet 650 mg  650 mg Oral Q4H PRN    naloxone (NARCAN) injection 0.4 mg  0.4 mg IntraVENous PRN    ondansetron (ZOFRAN) injection 4 mg  4 mg IntraVENous Q4H PRN    enoxaparin (LOVENOX) injection 40 mg  40 mg SubCUTAneous Q12H    insulin lispro (HUMALOG) injection   SubCUTAneous AC&HS    glucose chewable tablet 16 g  4 Tab Oral PRN    dextrose (D50W) injection syrg 12.5-25 g  12.5-25 g IntraVENous PRN    glucagon (GLUCAGEN) injection 1 mg  1 mg IntraMUSCular PRN    aspirin delayed-release tablet 81 mg  81 mg Oral DAILY    atorvastatin (LIPITOR) tablet 40 mg  40 mg Oral DAILY    escitalopram oxalate (LEXAPRO) tablet 20 mg  20 mg Oral DAILY    ferrous sulfate tablet 325 mg  325 mg Oral DAILY WITH BREAKFAST    omega-3 acid ethyl esters (LOVAZA) capsule 4,000 mg  4 g Oral DAILY WITH BREAKFAST    guaiFENesin ER (MUCINEX) tablet 600 mg  600 mg Oral Q12H    levoFLOXacin (LEVAQUIN) tablet 750 mg  750 mg Oral Q24H      No Known Allergies     Review of Systems: A complete review of systems was obtained, negative except as described above.     Physical Exam:     Visit Vitals    /69 (BP 1 Location: Right arm, BP Patient Position: Supine)    Pulse 90    Temp 98.5 °F (36.9 °C)    Resp 20    Ht 4' 8\" (1.422 m)    Wt 82.1 kg (181 lb)    SpO2 96%    Breastfeeding No    BMI 40.58 kg/m2     ECOG PS: 1  General: No distress  Eyes: PERRLA, anicteric sclerae  HENT: Atraumatic, OP clear  Neck: Supple  Lymphatic: No cervical, supraclavicular, or inguinal adenopathy  Respiratory: CTAB, normal respiratory effort  CV: Normal rate, regular rhythm, no murmurs, no peripheral edema  GI: Soft, nontender, + distended, no masses, no hepatomegaly, no splenomegaly    Skin: No rashes, ecchymoses, or petechiae. Normal temperature, turgor, and texture. Psych: Alert, oriented, appropriate affect, normal judgment/insight    Results:     Lab Results   Component Value Date/Time    WBC 19.5 11/28/2017 04:15 AM    HGB 8.8 11/28/2017 04:15 AM    HCT 27.5 11/28/2017 04:15 AM    PLATELET 002 88/15/4943 04:15 AM    MCV 89.3 11/28/2017 04:15 AM    ABS. NEUTROPHILS 16.6 11/28/2017 04:15 AM     Lab Results   Component Value Date/Time    Sodium 140 11/28/2017 04:15 AM    Potassium 3.9 11/28/2017 04:15 AM    Chloride 106 11/28/2017 04:15 AM    CO2 24 11/28/2017 04:15 AM    Glucose 187 11/28/2017 04:15 AM    BUN 8 11/28/2017 04:15 AM    Creatinine 0.74 11/28/2017 04:15 AM    GFR est AA >60 11/28/2017 04:15 AM    GFR est non-AA >60 11/28/2017 04:15 AM    Calcium 7.9 11/28/2017 04:15 AM    Glucose (POC) 132 11/28/2017 11:42 AM     Lab Results   Component Value Date/Time    Bilirubin, total 0.5 11/27/2017 08:50 AM    ALT (SGPT) 39 11/27/2017 08:50 AM    AST (SGOT) 26 11/27/2017 08:50 AM    Alk. phosphatase 103 11/27/2017 08:50 AM    Protein, total 7.4 11/27/2017 08:50 AM    Albumin 3.0 11/27/2017 08:50 AM    Globulin 4.4 11/27/2017 08:50 AM       11/27/2017 Peripheral smear  Mild normocytic normochromic anemia with mild anisocytosis. Leukocytosis with neutrophilia and toxic granulation. Few large   platelets noted. Reviewed by Dr. Lance Hendricks, 11/27/16.      11/27/17 CT chest/neck  IMPRESSION:  Hepatic steatosis. No acute abnormality. IMPRESSION:  1. No mass lesion is demonstrated. 2. Retropharyngeal course of the internal carotid arteries on the right and on  the left with tortuosity more so on the right. Records reviewed and summarized above. Radiology report(s) reviewed above. 11/28/2017 CT ABD PELV  CONT  pending      Assessment/plan:   1.  Neutrophilia:    etiology is uncertain ; possible secondary to septic arthritis   Improving this am     JAK2 pending  CT ABD/PELV pending  BCR-ABL pending      2. Septic arthritis  S/p 11/27/17  left shoulder washout (Dr Sol Hurtado)  Ortho following  abx coverage    3. Anemia, normocytic  Unclear etiology  eval with anemia labs in am      4.  Pain  Encouraged to ask for pain medication    Plan reviewed with Dr Abelino Salas    Signed By: Andrés Cano NP

## 2017-11-28 NOTE — PERIOP NOTES
TRANSFER - OUT REPORT:    Verbal report given to Brigitte Murillo RN (name) on Zay Fraser  being transferred to [facility] (unit) for routine post - op       Report consisted of patients Situation, Background, Assessment and   Recommendations(SBAR). Information from the following report(s) SBAR, Kardex, [facility] and Cardiac Rhythm ST was reviewed with the receiving nurse. Lines:   Peripheral IV 11/27/17 Right Antecubital (Active)   Site Assessment Intact 11/28/2017 12:00 AM   Phlebitis Assessment 0 11/28/2017 12:00 AM   Infiltration Assessment 0 11/28/2017 12:00 AM   Dressing Status Intact 11/28/2017 12:00 AM   Dressing Type Transparent;Tape 11/28/2017 12:00 AM   Hub Color/Line Status Pink 11/28/2017 12:00 AM   Alcohol Cap Used Yes 11/28/2017 12:00 AM       Peripheral IV 11/27/17 Left Hand (Active)   Site Assessment Clean, dry, & intact 11/28/2017 12:00 AM   Phlebitis Assessment 0 11/28/2017 12:00 AM   Infiltration Assessment 0 11/28/2017 12:00 AM   Dressing Status Clean, dry, & intact 11/28/2017 12:00 AM   Dressing Type Transparent;Tape 11/28/2017 12:00 AM   Hub Color/Line Status Pink 11/28/2017 12:00 AM   Action Taken Blood drawn 11/27/2017 11:44 AM   Alcohol Cap Used Yes 11/28/2017 12:00 AM       Peripheral IV 11/27/17 Right Hand (Active)   Site Assessment Clean, dry, & intact 11/28/2017 12:00 AM   Phlebitis Assessment 0 11/28/2017 12:00 AM   Infiltration Assessment 0 11/28/2017 12:00 AM   Dressing Status Clean, dry, & intact 11/28/2017 12:00 AM   Dressing Type Transparent;Tape 11/28/2017 12:00 AM   Hub Color/Line Status Pink 11/28/2017 12:00 AM   Alcohol Cap Used Yes 11/28/2017 12:00 AM        Opportunity for questions and clarification was provided.       Patient transported with:   Monitor  O2 @ 4 liters  Registered Nurse

## 2017-11-29 LAB
BASOPHILS # BLD: 0 K/UL (ref 0–0.1)
BASOPHILS NFR BLD: 0 % (ref 0–1)
CREAT SERPL-MCNC: 0.72 MG/DL (ref 0.55–1.02)
EOSINOPHIL # BLD: 0.2 K/UL (ref 0–0.4)
EOSINOPHIL NFR BLD: 1 % (ref 0–7)
ERYTHROCYTE [DISTWIDTH] IN BLOOD BY AUTOMATED COUNT: 16.1 % (ref 11.5–14.5)
FERRITIN SERPL-MCNC: 67 NG/ML (ref 8–252)
FOLATE SERPL-MCNC: 17.4 NG/ML (ref 5–21)
GLUCOSE BLD STRIP.AUTO-MCNC: 150 MG/DL (ref 65–100)
GLUCOSE BLD STRIP.AUTO-MCNC: 153 MG/DL (ref 65–100)
GLUCOSE BLD STRIP.AUTO-MCNC: 178 MG/DL (ref 65–100)
GLUCOSE BLD STRIP.AUTO-MCNC: 187 MG/DL (ref 65–100)
HCT VFR BLD AUTO: 28.1 % (ref 35–47)
HGB BLD-MCNC: 8.9 G/DL (ref 11.5–16)
IRON SATN MFR SERPL: 3 % (ref 20–50)
IRON SERPL-MCNC: 11 UG/DL (ref 35–150)
LYMPHOCYTES # BLD: 2.1 K/UL (ref 0.8–3.5)
LYMPHOCYTES NFR BLD: 13 % (ref 12–49)
MCH RBC QN AUTO: 28.3 PG (ref 26–34)
MCHC RBC AUTO-ENTMCNC: 31.7 G/DL (ref 30–36.5)
MCV RBC AUTO: 89.2 FL (ref 80–99)
MONOCYTES # BLD: 1.4 K/UL (ref 0–1)
MONOCYTES NFR BLD: 9 % (ref 5–13)
NEUTS SEG # BLD: 12.8 K/UL (ref 1.8–8)
NEUTS SEG NFR BLD: 77 % (ref 32–75)
PLATELET # BLD AUTO: 302 K/UL (ref 150–400)
RBC # BLD AUTO: 3.15 M/UL (ref 3.8–5.2)
RETICS/RBC NFR AUTO: 3.3 % (ref 0.7–2.1)
SERVICE CMNT-IMP: ABNORMAL
TIBC SERPL-MCNC: 352 UG/DL (ref 250–450)
VIT B12 SERPL-MCNC: 553 PG/ML (ref 211–911)
WBC # BLD AUTO: 16.5 K/UL (ref 3.6–11)

## 2017-11-29 PROCEDURE — 74011250636 HC RX REV CODE- 250/636: Performed by: INTERNAL MEDICINE

## 2017-11-29 PROCEDURE — 97116 GAIT TRAINING THERAPY: CPT

## 2017-11-29 PROCEDURE — 83540 ASSAY OF IRON: CPT | Performed by: NURSE PRACTITIONER

## 2017-11-29 PROCEDURE — 97162 PT EVAL MOD COMPLEX 30 MIN: CPT

## 2017-11-29 PROCEDURE — 74011636637 HC RX REV CODE- 636/637: Performed by: INTERNAL MEDICINE

## 2017-11-29 PROCEDURE — 82962 GLUCOSE BLOOD TEST: CPT

## 2017-11-29 PROCEDURE — 82607 VITAMIN B-12: CPT | Performed by: NURSE PRACTITIONER

## 2017-11-29 PROCEDURE — 65660000000 HC RM CCU STEPDOWN

## 2017-11-29 PROCEDURE — 74011250637 HC RX REV CODE- 250/637: Performed by: INTERNAL MEDICINE

## 2017-11-29 PROCEDURE — 74011250637 HC RX REV CODE- 250/637: Performed by: NURSE PRACTITIONER

## 2017-11-29 PROCEDURE — 36415 COLL VENOUS BLD VENIPUNCTURE: CPT | Performed by: INTERNAL MEDICINE

## 2017-11-29 PROCEDURE — 97530 THERAPEUTIC ACTIVITIES: CPT

## 2017-11-29 PROCEDURE — 85045 AUTOMATED RETICULOCYTE COUNT: CPT | Performed by: NURSE PRACTITIONER

## 2017-11-29 PROCEDURE — 85025 COMPLETE CBC W/AUTO DIFF WBC: CPT | Performed by: INTERNAL MEDICINE

## 2017-11-29 PROCEDURE — 82728 ASSAY OF FERRITIN: CPT | Performed by: NURSE PRACTITIONER

## 2017-11-29 PROCEDURE — 65270000029 HC RM PRIVATE

## 2017-11-29 PROCEDURE — 82746 ASSAY OF FOLIC ACID SERUM: CPT | Performed by: NURSE PRACTITIONER

## 2017-11-29 PROCEDURE — 77010033678 HC OXYGEN DAILY

## 2017-11-29 PROCEDURE — 82565 ASSAY OF CREATININE: CPT | Performed by: INTERNAL MEDICINE

## 2017-11-29 RX ORDER — POLYETHYLENE GLYCOL 3350 17 G/17G
17 POWDER, FOR SOLUTION ORAL
Status: DISCONTINUED | OUTPATIENT
Start: 2017-11-29 | End: 2017-12-01 | Stop reason: HOSPADM

## 2017-11-29 RX ORDER — NYSTATIN 100000 [USP'U]/ML
500000 SUSPENSION ORAL 4 TIMES DAILY
Status: DISCONTINUED | OUTPATIENT
Start: 2017-11-29 | End: 2017-12-01 | Stop reason: HOSPADM

## 2017-11-29 RX ORDER — DIPHENHYDRAMINE HCL 25 MG
25 CAPSULE ORAL ONCE
Status: DISPENSED | OUTPATIENT
Start: 2017-11-29 | End: 2017-11-30

## 2017-11-29 RX ORDER — AMOXICILLIN 250 MG
1 CAPSULE ORAL DAILY
Status: DISCONTINUED | OUTPATIENT
Start: 2017-11-29 | End: 2017-12-01 | Stop reason: HOSPADM

## 2017-11-29 RX ADMIN — ASPIRIN 81 MG: 81 TABLET, COATED ORAL at 08:21

## 2017-11-29 RX ADMIN — VANCOMYCIN HYDROCHLORIDE 1250 MG: 10 INJECTION, POWDER, LYOPHILIZED, FOR SOLUTION INTRAVENOUS at 11:09

## 2017-11-29 RX ADMIN — FERROUS SULFATE TAB 325 MG (65 MG ELEMENTAL FE) 325 MG: 325 (65 FE) TAB at 08:21

## 2017-11-29 RX ADMIN — DOCUSATE SODIUM AND SENNOSIDES 1 TABLET: 8.6; 5 TABLET, FILM COATED ORAL at 11:45

## 2017-11-29 RX ADMIN — LEVOFLOXACIN 750 MG: 750 TABLET, FILM COATED ORAL at 11:45

## 2017-11-29 RX ADMIN — NYSTATIN 500000 UNITS: 100000 SUSPENSION ORAL at 17:18

## 2017-11-29 RX ADMIN — ENOXAPARIN SODIUM 40 MG: 40 INJECTION SUBCUTANEOUS at 08:20

## 2017-11-29 RX ADMIN — ENOXAPARIN SODIUM 40 MG: 40 INJECTION SUBCUTANEOUS at 22:14

## 2017-11-29 RX ADMIN — INSULIN LISPRO 2 UNITS: 100 INJECTION, SOLUTION INTRAVENOUS; SUBCUTANEOUS at 11:44

## 2017-11-29 RX ADMIN — INSULIN LISPRO 2 UNITS: 100 INJECTION, SOLUTION INTRAVENOUS; SUBCUTANEOUS at 17:18

## 2017-11-29 RX ADMIN — VANCOMYCIN HYDROCHLORIDE 1250 MG: 10 INJECTION, POWDER, LYOPHILIZED, FOR SOLUTION INTRAVENOUS at 23:23

## 2017-11-29 RX ADMIN — INSULIN LISPRO 2 UNITS: 100 INJECTION, SOLUTION INTRAVENOUS; SUBCUTANEOUS at 08:20

## 2017-11-29 RX ADMIN — Medication 10 ML: at 22:16

## 2017-11-29 RX ADMIN — ESCITALOPRAM OXALATE 20 MG: 10 TABLET ORAL at 08:21

## 2017-11-29 RX ADMIN — ONDANSETRON 4 MG: 2 INJECTION INTRAMUSCULAR; INTRAVENOUS at 06:03

## 2017-11-29 RX ADMIN — OMEGA-3-ACID ETHYL ESTERS 4000 MG: 900 CAPSULE, LIQUID FILLED ORAL at 10:19

## 2017-11-29 RX ADMIN — NYSTATIN 500000 UNITS: 100000 SUSPENSION ORAL at 12:30

## 2017-11-29 RX ADMIN — NYSTATIN 500000 UNITS: 100000 SUSPENSION ORAL at 08:22

## 2017-11-29 RX ADMIN — GUAIFENESIN 600 MG: 600 TABLET, EXTENDED RELEASE ORAL at 08:21

## 2017-11-29 RX ADMIN — ATORVASTATIN CALCIUM 40 MG: 10 TABLET, FILM COATED ORAL at 08:21

## 2017-11-29 RX ADMIN — NYSTATIN 500000 UNITS: 100000 SUSPENSION ORAL at 22:14

## 2017-11-29 RX ADMIN — GUAIFENESIN 600 MG: 600 TABLET, EXTENDED RELEASE ORAL at 22:14

## 2017-11-29 NOTE — PROGRESS NOTES
Occupational Therapy Note:  Chart reviewed. Attempted to see patient however she was transferring off the floor and unavailable. Will continue to follow.   Jennifer Richard OTR/L

## 2017-11-29 NOTE — PROGRESS NOTES
Problem: Mobility Impaired (Adult and Pediatric)  Goal: *Acute Goals and Plan of Care (Insert Text)  Physical Therapy Goals  Initiated 11/29/2017  1. Patient will move from supine to sit and sit to supine  in bed with  independence within 7 day(s). 2.  Patient will transfer from bed to chair and chair to bed with independence using the least restrictive device within 7 day(s). 3.  Patient will perform sit to stand with independence within 7 day(s). 4.  Patient will ambulate with modified independence for 150 feet with the least restrictive device within 7 day(s). physical Therapy EVALUATION  Patient: Brent Almonte (3 y.o. female)  Date: 11/29/2017  Primary Diagnosis: Sepsis ([address]  SEPTIC SHOULDER  Procedure(s) (LRB):  SHOULDER ARTHROSCOPIC WASHOUT (Left) 2 Days Post-Op   Precautions:   Fall (L sh limb alert; s/p arthroscoopic draining)    ASSESSMENT :  Based on the objective data described below, the patient presents with admission due to Sepsis and need for L shoulder arthroscopic flushing with septic arthritis. Received standing and bending over in room next to bed. Dtr present. Pt instructed to sit back down on EOB and to not get up without staff assist.  Pt on 3LO2 and stating uses 2LO2 at night at home. Independent ambulator PTA. L shoulder with very limited ROM post procedure. OT to provide pendulum ex instruction. Sit to stand with SBA. Gait of 40' in room without need of asst device. Desaturating on room air at rest and needing 3L with gait. See below chart. Pt will benefit from continued PT tx to progress gait, activity tolerance and , strength. Placed in chair with call bell and tray. RN notified of pt position.   Documentation for home O2:     ROOM AIR    AT REST   O2 SATS  82 HR  96   ROOM AIR WITH ACTIVITY 02 SATS  88 HR  96   (3    ) LITERS OF O2 WITH ACTIVITY O2 SATS  90 HR  107   (3    )LITERS OF 02 PATIENT LEFT COMFORTABLY  SITTING/SUPINE 02 SATS  92 HR  96     Patient will benefit from skilled intervention to address the above impairments. Patients rehabilitation potential is considered to be Good  Factors which may influence rehabilitation potential include:   [x]         None noted  []         Mental ability/status  []         Medical condition  []         Home/family situation and support systems  []         Safety awareness  []         Pain tolerance/management  []         Other:      PLAN :  Recommendations and Planned Interventions:  [x]           Bed Mobility Training             []    Neuromuscular Re-Education  [x]           Transfer Training                   []    Orthotic/Prosthetic Training  [x]           Gait Training                         []    Modalities  [x]           Therapeutic Exercises           []    Edema Management/Control  [x]           Therapeutic Activities            [x]    Patient and Family Training/Education  []           Other (comment):    Frequency/Duration: Patient will be followed by physical therapy  5 times a week to address goals. Discharge Recommendations: Home Health  Further Equipment Recommendations for Discharge: tbd;      SUBJECTIVE:   Patient stated I can walk.     OBJECTIVE DATA SUMMARY:   HISTORY:    Past Medical History:   Diagnosis Date    Hypercholesterolemia     Hyperglycemia due to type 2 diabetes mellitus ([facility] [address] 8/4/2016    Ill-defined condition     cellulitis    Major depression     depression    Pneumonia     2011 or so    Transient ischemic attack      Past Surgical History:   Procedure Laterality Date    COLONOSCOPY  6/24/2016         COLONOSCOPY N/A 6/24/2016    COLONOSCOPY performed by Jackie Chavez MD at [address] N/A 8/26/2016    SIGMOIDOSCOPY FLEXIBLE performed by Jackie Chavez MD at [facility] HX APPENDECTOMY      UPPER GI ENDOSCOPY,DIAGNOSIS  8/26/2016          Prior Level of Function/Home Situation: Independent; Sr. Apt with   Personal factors and/or comorbidities impacting plan of care: Sepsis    Home Situation  Home Environment: [place] [id number] Name:  ([address] at [facility])  [address] [place] [address] St: One story (3rd floor  elevator access)  Living Alone: No (lives with )  Support Systems: Spouse/Significant Other/Partner  Patient Expects to be Discharged to[de-identified] Assisted living  Current DME Used/Available at Home: Grab bars  Tub or Shower Type: Shower    EXAMINATION/PRESENTATION/DECISION MAKING:   Critical Behavior:  Neurologic State: Alert  Orientation Level: Oriented X4  Cognition: Follows commands  Safety/Judgement: Fall prevention, Insight into deficits  Hearing:   Auditory  Auditory Impairment: None  Skin:  IV  Edema: LUE  Range Of Motion:  AROM: Within functional limits (excluding L shoulder)                       Strength:    Strength: Generally decreased, functional                    Tone & Sensation:   Tone: Normal                              Coordination:  Coordination: Generally decreased, functional  Vision:      Functional Mobility:  Bed Mobility:  Rolling: Modified independent  Supine to Sit: Modified independent  Sit to Supine: Modified independent  Scooting: Modified independent  Transfers:  Sit to Stand: Supervision  Stand to Sit: Supervision                       Balance:   Sitting: Intact  Standing: Intact  Ambulation/Gait Training:  Distance (ft): 40 Feet (ft)  Assistive Device: Gait belt                    Base of Support: Narrowed     Speed/[place]helsea: Slow;Pace decreased (<100 feet/min)  Step Length: Right shortened;Left shortened                    Functional Measure:  Barthel Index:    Bathin  Bladder: 10  Bowels: 10  Groomin  Dressin  Feeding: 10  Mobility: 0  Stairs: 0  Toilet Use: 5  Transfer (Bed to Chair and Back): 10  Total: 60       Barthel and G-code impairment scale:  Percentage of impairment CH  0% CI  1-19% CJ  20-39% CK  40-59% CL  60-79% CM  80-99% CN  100%   Barthel Score 0-100 100 99-80 79-60 59-40 20-39 1-19   0   Barthel Score 0-20 20 17-19 13-16 9-12 5-8 1-4 0      The Barthel ADL Index: Guidelines  1. The index should be used as a record of what a patient does, not as a record of what a patient could do. 2. The main aim is to establish degree of independence from any help, physical or verbal, however minor and for whatever reason. 3. The need for supervision renders the patient not independent. 4. A patient's performance should be established using the best available evidence. Asking the patient, friends/relatives and nurses are the usual sources, but direct observation and common sense are also important. However direct testing is not needed. 5. Usually the patient's performance over the preceding 24-48 hours is important, but occasionally longer periods will be relevant. 6. Middle categories imply that the patient supplies over 50 per cent of the effort. 7. Use of aids to be independent is allowed. Lisa Peterson., Barthel, D.W. (3886). Functional evaluation: the Barthel Index. [address] [facility] (14)2. Nette Gallardo adams JEROME Wadsworth, Jana Goodpasture., Derian Pepper., [place], [address] (1999). Measuring the change indisability after inpatient rehabilitation; comparison of the responsiveness of the Barthel Index and Functional [place] Measure. Journal of Neurology, Neurosurgery, and Psychiatry, 66(4), 702-686. Eli Ren N.AUSTIN.GABBIE, Farideh Silva  W.J.M, & Brad Irizarry, M.GABBIE. (2004.) Assessment of post-stroke quality of life in cost-effectiveness studies: The usefulness of the Barthel Index and the EuroQoL-5D. Quality of Life Research, 13, 010-84       G codes: In compliance with CMSs Claims Based Outcome Reporting, the following G-code set was chosen for this patient based on their primary functional limitation being treated:     The outcome measure chosen to determine the severity of the functional limitation was the barthel with a score of 60/100 which was correlated with the impairment scale.    ? Mobility - Walking and Moving Around:     - CURRENT STATUS: CJ - 20%-39% impaired, limited or restricted    - GOAL STATUS: CI - 1%-19% impaired, limited or restricted    - D/C STATUS:  ---------------To be determined---------------      Physical Therapy Evaluation Charge Determination   History Examination Presentation Decision-Making   HIGH Complexity :3+ comorbidities / personal factors will impact the outcome/ POC  MEDIUM Complexity : 3 Standardized tests and measures addressing body structure, function, activity limitation and / or participation in recreation  MEDIUM Complexity : Evolving with changing characteristics  Other outcome measures barthel  MEDIUM      Based on the above components, the patient evaluation is determined to be of the following complexity level: MEDIUM    Pain:  Pain Scale 1: Numeric (0 - 10)  Pain Intensity 1: 0              Activity Tolerance:   fair  Please refer to the flowsheet for vital signs taken during this treatment. After treatment:   [x]         Patient left in no apparent distress sitting up in chair  []         Patient left in no apparent distress in bed  [x]         Call bell left within reach  [x]         Nursing notified  [x]         Caregiver present  []         Bed alarm activated    COMMUNICATION/EDUCATION:   The patients plan of care was discussed with: Registered Nurse. [x]         Fall prevention education was provided and the patient/caregiver indicated understanding. [x]         Patient/family have participated as able in goal setting and plan of care. [x]         Patient/family agree to work toward stated goals and plan of care. []         Patient understands intent and goals of therapy, but is neutral about his/her participation. []         Patient is unable to participate in goal setting and plan of care.     Thank you for this referral.  Sandro Alves, PT   Time Calculation: 30 mins

## 2017-11-29 NOTE — ADT AUTH CERT NOTES
Utilization Review           Systemic or Infectious Condition GR[facility] - Care Day 3 (11/29/2017) by Aracelis Espinosa RN        Review Status Review Entered       Completed 11/29/2017       Details              Care Day: 3 Care Date: 11/29/2017 Level of Care: Inpatient Floor       Guideline Day 2        Level Of Care       (X) Floor       11/29/2017 1:32 PM EST by Heena Harper         ortho bed                     Clinical Status       (X) * No ICU or intermediate care needs              Interventions       (X) Inpatient interventions continue       11/29/2017 1:32 PM EST by Heena Harper         Vamco IV q12, Lovenox sc q12h, SSI, Zofran IV x1              ( ) Transition to oral routes       11/29/2017 1:32 PM EST by Heena Harper         Levaquin po q24h, Mucinex q12, Fe So4 qd                                          * Milestone              Additional Notes       99.6-91-20, 133/61, 93% on 3L              Wbc 16.5, H/H 8.9/28.1, Retic count 3.3              Iron 11, Iron % sat 3              Blood cx no growth in 2 days                     Diabetic diet, OT/PT, Cardiac monitor, Ambulate w/assist                     Tongue pain and nausea               Assessment and Plan:       1.  Septic arthritis. S/p I&D on 11/27. Broaden ABx and follow cultures. Leukocytosis has improved. Orthopedics evaluation appreciated                 2.  Leukocytosis - likely secondary to above. Improving. Continue ABx. Evaluated by hematology                 3.  Hyperlipidemia (12/20/2013). On statin                 4.  Depression (12/21/2013)-continue Lexapro                5.  Hyperglycemia due to type 2 diabetes mellitus ([facility] [address] (8/4/2016). A1c is 7.9. Cover with SSI. Metformin is on hold.                 6.  Obesity, morbid (HCC) (11/27/2017)-counseled on weight loss on admission               7.  Lactic acidosis (11/27/2017) - likely secondary to #1, resolved.                8.  White tongue lesion, painful.  Easily pealed part of it and sent to the lab for pathology analysis. Doubt thrush                     HEMATOLOGY              Assessment/plan:       1. Neutrophilia:         etiology is uncertain ; likely secondary to septic arthritis        Improving this am        JAK2 pending       BCR-ABL pending                       2. Septic arthritis       S/p 11/27/17  left shoulder washout (Dr Malvin Hendricks)       Ortho following       abx coverage               3. Anemia, normocytic       Unclear etiology       Recommend continue with daily oral Fe               4. Tongue lesion       Underside of tongue       Sample sent to lab for path by hospitalist       Consider ENT consult; discussed with Dr Lesli Pimentel             5. Pain       Controlled with current medication               6. Constipation       Bowel regimen initiated        ---Please update admit date to 11/27/2017.  Thank You!---

## 2017-11-29 NOTE — PROGRESS NOTES
[facility]  [facility] [facility], [address] [place] [address]  [phone]      Medical Progress Note      NAME: Lon Davey   :  1940  MRM:  [id number]    Date/Time: 2017  7:24 AM       Assessment and Plan:   1. Septic arthritis. S/p I&D on . Broaden ABx and follow cultures. Leukocytosis has improved. Orthopedics evaluation appreciated      2. Leukocytosis - likely secondary to above. Improving. Continue ABx. Evaluated by hematology      3. Hyperlipidemia (2013). On statin      4. Depression (2013)-continue Lexapro     5. Hyperglycemia due to type 2 diabetes mellitus ([facility] (2016). A1c is 7.9. Cover with SSI. Metformin is on hold. 6.  Obesity, morbid ([facility] (2017)-counseled on weight loss on admission    7. Lactic acidosis (2017) - likely secondary to #1, resolved. 8.  White tongue lesion, painful. Easily pealed part of it and sent to the lab for pathology analysis. Doubt thrush               Subjective:     Chief Complaint:  Follow up pt who was admitted with SIRS/ septic arthritis. Tongue pain and nausea    ROS:  (bold if positive, if negative)      Tolerating PT  Tolerating Diet        Objective:     Last 24hrs VS reviewed since prior progress note.  Most recent are:    Visit Vitals    /72 (BP 1 Location: Right arm, BP Patient Position: At rest)    Pulse 96    Temp 99 °F (37.2 °C)    Resp 19    Ht 4' 8\" (1.422 m)    Wt 88 kg (194 lb 0.1 oz)    SpO2 92%    Breastfeeding No    BMI 43.5 kg/m2     SpO2 Readings from Last 6 Encounters:   17 92%   17 95%   16 94%   08/10/16 94%   16 96%   06/10/16 96%    O2 Flow Rate (L/min): 3 l/min   No intake or output data in the 24 hours ending 17 0724     Physical Exam:    Gen:  obese, in no acute distress  HEENT:  Pink conjunctivae, PERRL, hearing intact to voice, moist mucous membranes  Neck:  Supple, without masses, thyroid non-tender  Resp:  No accessory muscle use, clear breath sounds without wheezes rales or rhonchi  Card:  No murmurs, normal S1, S2 without thrills, bruits or peripheral edema  Abd:  Soft, non-tender, non-distended, normoactive bowel sounds are present, no palpable organomegaly and no detectable hernias  Lymph:  No cervical or inguinal adenopathy  Musc:  No cyanosis or clubbing  Skin:  No rashes or ulcers, skin turgor is good  Neuro:  Cranial nerves are grossly intact, no focal motor weakness, follows commands appropriately  Psych:  Good insight, oriented to person, place and time, alert  __________________________________________________________________  Medications Reviewed: (see below)  Medications:     Current Facility-Administered Medications   Medication Dose Route Frequency    oxyCODONE-acetaminophen (PERCOCET) 5-325 mg per tablet 2 Tab  2 Tab Oral Q6H PRN    vancomycin (VANCOCIN) 1,250 mg in 0.9% sodium chloride 250 mL IVPB  1,250 mg IntraVENous Q12H    sodium chloride (NS) flush 5-10 mL  5-10 mL IntraVENous PRN    sodium chloride (NS) flush 5-10 mL  5-10 mL IntraVENous Q8H    sodium chloride (NS) flush 5-10 mL  5-10 mL IntraVENous PRN    acetaminophen (TYLENOL) tablet 650 mg  650 mg Oral Q4H PRN    naloxone (NARCAN) injection 0.4 mg  0.4 mg IntraVENous PRN    ondansetron (ZOFRAN) injection 4 mg  4 mg IntraVENous Q4H PRN    enoxaparin (LOVENOX) injection 40 mg  40 mg SubCUTAneous Q12H    insulin lispro (HUMALOG) injection   SubCUTAneous AC&HS    glucose chewable tablet 16 g  4 Tab Oral PRN    dextrose (D50W) injection syrg 12.5-25 g  12.5-25 g IntraVENous PRN    glucagon (GLUCAGEN) injection 1 mg  1 mg IntraMUSCular PRN    aspirin delayed-release tablet 81 mg  81 mg Oral DAILY    atorvastatin (LIPITOR) tablet 40 mg  40 mg Oral DAILY    escitalopram oxalate (LEXAPRO) tablet 20 mg  20 mg Oral DAILY    ferrous sulfate tablet 325 mg  325 mg Oral DAILY WITH BREAKFAST    omega-3 acid ethyl esters (LOVAZA) capsule 4,000 mg  4 g Oral DAILY WITH BREAKFAST    guaiFENesin ER (MUCINEX) tablet 600 mg  600 mg Oral Q12H    levoFLOXacin (LEVAQUIN) tablet 750 mg  750 mg Oral Q24H        Lab Data Reviewed: (see below)  Lab Review:     Recent Labs      11/29/17 0100 11/28/17 0415 11/27/17   0850   WBC  16.5*  19.5*  26.9*   HGB  8.9*  8.8*  10.8*   HCT  28.1*  27.5*  34.6*   PLT  302  271  333     Recent Labs      11/29/17 0100 11/28/17 0415 11/27/17   0850   NA   --   140  138   K   --   3.9  3.9   CL   --   106  101   CO2   --   24  26   GLU   --   187*  221*   BUN   --   8  13   CREA  0.72  0.74  0.87   CA   --   7.9*  9.0   MG   --   1.5*   --    ALB   --    --   3.0*   TBILI   --    --   0.5   SGOT   --    --   26   ALT   --    --   39     Lab Results   Component Value Date/Time    Glucose (POC) 187 11/29/2017 06:54 AM    Glucose (POC) 141 11/28/2017 08:47 PM    Glucose (POC) 136 11/28/2017 04:50 PM    Glucose (POC) 132 11/28/2017 11:42 AM    Glucose (POC) 157 11/28/2017 07:42 AM     No results for input(s): PH, PCO2, PO2, HCO3, FIO2 in the last 72 hours. No results for input(s): INR in the last 72 hours.     No lab exists for component: INREXT, INREXT  All Micro Results     Procedure Component Value Units Date/Time    CULTURE, BODY FLUID [employer] STAIN [[id number]] Collected:  11/27/17 1919    Order Status:  Completed Specimen:  Joint Fluid Updated:  11/28/17 0957     Special Requests: NO SPECIAL REQUESTS        GRAM STAIN RARE WBCS SEEN         NO ORGANISMS SEEN        Culture result:         Culture performed on Fluid swab specimen      NO GROWTH AFTER 11 HOURS       CULTURE, BLOOD [[id number]] Collected:  11/27/17 1143    Order Status:  Completed Specimen:  Blood from Blood Updated:  11/28/17 0732     Special Requests: NO SPECIAL REQUESTS        Culture result: NO GROWTH AFTER 19 HOURS       CULTURE, RESPIRATORY/SPUTUM/BRONCH Robert Benítez [[id number]]     Order Status:  Sent Specimen:  Sputum from Sputum           I have reviewed notes of prior 24hr. Other pertinent lab:       Total time spent with patient: [address] discussed with: Patient, Nursing Staff and >50% of time spent in counseling and coordination of care    Discussed:  Care Plan    Prophylaxis:  Lovenox    Disposition:  Home w/Family           ___________________________________________________    Attending Physician: Shayy Moon MD

## 2017-11-29 NOTE — PROGRESS NOTES
Charge nurse chart review. 1045: Added humidification to 4L NC administration. Pt has dry and bloody nose at this time.

## 2017-11-29 NOTE — PROGRESS NOTES
Primary Nurse Doretha Bourgeois RN and Laurie Joy RN performed a dual skin assessment on this patient No impairment noted other than surgical site. Bay score is 19.

## 2017-11-29 NOTE — PROGRESS NOTES
[facility] [facility] [place] [address]  [address] [facility], [address] [facility] [address] [facility]  Valerie Berg: [phone]  F: [phone]      Reason for Visit:   Dagoberto Connors is a 69 y.o. female who is seen in consultation at the request of Dr. Negrito Moreira for evaluation of leukocytosis. History of Present Illness:   Per pt, presented to the ED with c/o cough, arm pain and multiple other complaints. Denies fevers/chills. No N/V/D or ab pain. No skin breakdown. No sick contacts. Pt was noted to have a leukocytosis and elevated lactic acid. CT chest and neck were unremarkable. In review of CC, leukocytosis goes back 3 years. Daughter states that she is gaining weight and abdominal girth. Interval History:     States has tongue sore on underside of tongue; started this am; restless during the night; having difficulty breathing; denies pain. Has not had a BM since prior to coming into the hospital. Normally has regular BM.  and granddaughter at bedside. History reviewed. No pertinent family history.   Current Facility-Administered Medications   Medication Dose Route Frequency    nystatin (MYCOSTATIN) 100,000 unit/mL oral suspension 500,000 Units  500,000 Units Oral QID    senna-docusate (PERICOLACE) 8.6-50 mg per tablet 1 Tab  1 Tab Oral DAILY    polyethylene glycol (MIRALAX) packet 17 g  17 g Oral DAILY PRN    oxyCODONE-acetaminophen (PERCOCET) 5-325 mg per tablet 2 Tab  2 Tab Oral Q6H PRN    vancomycin (VANCOCIN) 1,250 mg in 0.9% sodium chloride 250 mL IVPB  1,250 mg IntraVENous Q12H    sodium chloride (NS) flush 5-10 mL  5-10 mL IntraVENous PRN    sodium chloride (NS) flush 5-10 mL  5-10 mL IntraVENous Q8H    sodium chloride (NS) flush 5-10 mL  5-10 mL IntraVENous PRN    acetaminophen (TYLENOL) tablet 650 mg  650 mg Oral Q4H PRN    naloxone (NARCAN) injection 0.4 mg  0.4 mg IntraVENous PRN    ondansetron (ZOFRAN) injection 4 mg  4 mg IntraVENous Q4H PRN    enoxaparin (LOVENOX) injection 40 mg  40 mg SubCUTAneous Q12H    insulin lispro (HUMALOG) injection   SubCUTAneous AC&HS    glucose chewable tablet 16 g  4 Tab Oral PRN    dextrose (D50W) injection syrg 12.5-25 g  12.5-25 g IntraVENous PRN    glucagon (GLUCAGEN) injection 1 mg  1 mg IntraMUSCular PRN    aspirin delayed-release tablet 81 mg  81 mg Oral DAILY    atorvastatin (LIPITOR) tablet 40 mg  40 mg Oral DAILY    escitalopram oxalate (LEXAPRO) tablet 20 mg  20 mg Oral DAILY    ferrous sulfate tablet 325 mg  325 mg Oral DAILY WITH BREAKFAST    omega-3 acid ethyl esters (LOVAZA) capsule 4,000 mg  4 g Oral DAILY WITH BREAKFAST    guaiFENesin ER (MUCINEX) tablet 600 mg  600 mg Oral Q12H    levoFLOXacin (LEVAQUIN) tablet 750 mg  750 mg Oral Q24H      No Known Allergies     Review of Systems: A complete review of systems was obtained, negative except as described above. Physical Exam:     Visit Vitals    /63    Pulse 92    Temp 99.3 °F (37.4 °C)    Resp 18    Ht 4' 8\" (1.422 m)    Wt 88 kg (194 lb 0.1 oz)    SpO2 96%    Breastfeeding No    BMI 43.5 kg/m2     ECOG PS: 1  General: No distress  Eyes: PERRLA, anicteric sclerae  HENT: lesion noted to underside of tongue' white appearance; Atraumatic, OP clear  Neck: Supple  Respiratory: CTAB, normal respiratory effort; O2 in use. CV: Normal rate, regular rhythm, no murmurs, no peripheral edema  GI: Soft, nontender, + distended, no masses, no hepatomegaly, no splenomegaly  Skin: No rashes, ecchymoses, or petechiae. Normal temperature, turgor, and texture. Psych: Alert, oriented, appropriate affect, normal judgment/insight    Results:     Lab Results   Component Value Date/Time    WBC 16.5 11/29/2017 01:00 AM    HGB 8.9 11/29/2017 01:00 AM    HCT 28.1 11/29/2017 01:00 AM    PLATELET 726 54/78/4363 01:00 AM    MCV 89.2 11/29/2017 01:00 AM    ABS.  NEUTROPHILS 12.8 11/29/2017 01:00 AM     Lab Results   Component Value Date/Time    Sodium 140 11/28/2017 04:15 AM    Potassium 3.9 11/28/2017 04:15 AM    Chloride 106 11/28/2017 04:15 AM    CO2 24 11/28/2017 04:15 AM    Glucose 187 11/28/2017 04:15 AM    BUN 8 11/28/2017 04:15 AM    Creatinine 0.72 11/29/2017 01:00 AM    GFR est AA >60 11/29/2017 01:00 AM    GFR est non-AA >60 11/29/2017 01:00 AM    Calcium 7.9 11/28/2017 04:15 AM    Glucose (POC) 187 11/29/2017 06:54 AM     Lab Results   Component Value Date/Time    Bilirubin, total 0.5 11/27/2017 08:50 AM    ALT (SGPT) 39 11/27/2017 08:50 AM    AST (SGOT) 26 11/27/2017 08:50 AM    Alk. phosphatase 103 11/27/2017 08:50 AM    Protein, total 7.4 11/27/2017 08:50 AM    Albumin 3.0 11/27/2017 08:50 AM    Globulin 4.4 11/27/2017 08:50 AM       11/27/2017 Peripheral smear  Mild normocytic normochromic anemia with mild anisocytosis. Leukocytosis with neutrophilia and toxic granulation. Few large   platelets noted. Reviewed by Dr. Ck Lopes, 11/27/16.      11/27/17 CT chest/neck  IMPRESSION:  Hepatic steatosis. No acute abnormality. IMPRESSION:  1. No mass lesion is demonstrated. 2. Retropharyngeal course of the internal carotid arteries on the right and on  the left with tortuosity more so on the right. Records reviewed and summarized above. Radiology report(s) reviewed above. 11/28/2017 CT ABD PELV  CONT  IMPRESSION:  No acute intraperitoneal process.     No significant interval change compared to 6/10/2016.     Minimal basilar atelectasis.     Hepatic steatosis. Assessment/plan:   1. Neutrophilia:    etiology is uncertain ; likely secondary to septic arthritis   Improving this am   JAK2 pending  BCR-ABL pending      2. Septic arthritis  S/p 11/27/17  left shoulder washout (Dr Brendan Rocha)  Ortho following  abx coverage    3. Anemia, normocytic  Unclear etiology  Recommend continue with daily oral Fe    4. Tongue lesion  Underside of tongue  Sample sent to lab for path by hospitalist  Consider ENT consult; discussed with Dr Shanti Larkin      5.  Pain  Controlled with current medication    6.  Constipation  Bowel regimen initiated        Plan reviewed with Dr Thea Valadez    Signed By: Marshall Rodriguez NP

## 2017-11-29 NOTE — ROUTINE PROCESS
Bedside and Verbal shift change report given to Bayron Dan (oncoming nurse) by Tello Ceron RN (offgoing nurse). Report included the following information SBAR, Kardex, Intake/Output, MAR, Accordion and Recent Results. Bedside and Verbal shift change report given to Ascencion Khan RN (oncoming nurse) by Brigitte Murillo RN (offgoing nurse). Report included the following information SBAR, Kardex, Intake/Output, MAR, Accordion and Recent Results.

## 2017-11-29 NOTE — PROGRESS NOTES
1205  Pt c/o itching around the IV site located in her right hand which is also where the vancomycin is infusing. Upon assessment, site also appears swollen. IV removed. Switched vancomycin infusion to right AC IV site. Pt still c/o itching. Contacted Dr. Shefali Price and reported assessment findings. Received order for benadryl 25mg ONCE by mouth. Received order to slow the vancomycin infusion rate. 1530  TRANSFER - OUT REPORT:    Verbal report given to Kassie RN (name) on Landon Morton  being transferred to 4th floor (unit) for routine progression of care       Report consisted of patients Situation, Background, Assessment and   Recommendations(SBAR). Information from the following report(s) SBAR, Kardex, Intake/Output, MAR, Recent Results and Cardiac Rhythm NSR was reviewed with the receiving nurse. Lines:   Peripheral IV 11/27/17 Right Antecubital (Active)   Site Assessment Clean, dry, & intact 11/29/2017 11:56 AM   Phlebitis Assessment 0 11/29/2017 11:56 AM   Infiltration Assessment 0 11/29/2017 11:56 AM   Dressing Status Clean, dry, & intact 11/29/2017 11:56 AM   Dressing Type Tape;Transparent 11/29/2017 11:56 AM   Hub Color/Line Status Pink;Flushed;Capped 11/29/2017 11:56 AM   Alcohol Cap Used Yes 11/29/2017 11:56 AM        Opportunity for questions and clarification was provided.       Patient transported with:   O2 @ 2 liters  Tech

## 2017-11-30 ENCOUNTER — APPOINTMENT (OUTPATIENT)
Dept: GENERAL RADIOLOGY | Age: 77
DRG: 549 | End: 2017-11-30
Attending: INTERNAL MEDICINE
Payer: MEDICARE

## 2017-11-30 LAB
ANION GAP SERPL CALC-SCNC: 5 MMOL/L (ref 5–15)
BASOPHILS # BLD: 0 K/UL (ref 0–0.1)
BASOPHILS NFR BLD: 0 % (ref 0–1)
BUN SERPL-MCNC: 9 MG/DL (ref 6–20)
BUN/CREAT SERPL: 14 (ref 12–20)
CALCIUM SERPL-MCNC: 7.9 MG/DL (ref 8.5–10.1)
CHLORIDE SERPL-SCNC: 107 MMOL/L (ref 97–108)
CO2 SERPL-SCNC: 29 MMOL/L (ref 21–32)
CREAT SERPL-MCNC: 0.65 MG/DL (ref 0.55–1.02)
DATE LAST DOSE: NORMAL
EOSINOPHIL # BLD: 0.3 K/UL (ref 0–0.4)
EOSINOPHIL NFR BLD: 2 % (ref 0–7)
ERYTHROCYTE [DISTWIDTH] IN BLOOD BY AUTOMATED COUNT: 15.9 % (ref 11.5–14.5)
GLUCOSE BLD STRIP.AUTO-MCNC: 158 MG/DL (ref 65–100)
GLUCOSE BLD STRIP.AUTO-MCNC: 172 MG/DL (ref 65–100)
GLUCOSE BLD STRIP.AUTO-MCNC: 179 MG/DL (ref 65–100)
GLUCOSE BLD STRIP.AUTO-MCNC: 194 MG/DL (ref 65–100)
GLUCOSE SERPL-MCNC: 148 MG/DL (ref 65–100)
HCT VFR BLD AUTO: 26.7 % (ref 35–47)
HGB BLD-MCNC: 8.1 G/DL (ref 11.5–16)
LYMPHOCYTES # BLD: 2.4 K/UL (ref 0.8–3.5)
LYMPHOCYTES NFR BLD: 17 % (ref 12–49)
MCH RBC QN AUTO: 27.4 PG (ref 26–34)
MCHC RBC AUTO-ENTMCNC: 30.3 G/DL (ref 30–36.5)
MCV RBC AUTO: 90.2 FL (ref 80–99)
MONOCYTES # BLD: 1.7 K/UL (ref 0–1)
MONOCYTES NFR BLD: 12 % (ref 5–13)
NEUTS SEG # BLD: 10.1 K/UL (ref 1.8–8)
NEUTS SEG NFR BLD: 69 % (ref 32–75)
NRBC # BLD: 0 K/UL (ref 0–0.01)
NRBC BLD-RTO: 0 PER 100 WBC
PLATELET # BLD AUTO: 296 K/UL (ref 150–400)
POTASSIUM SERPL-SCNC: 3.8 MMOL/L (ref 3.5–5.1)
RBC # BLD AUTO: 2.96 M/UL (ref 3.8–5.2)
REPORTED DOSE,DOSE: NORMAL UNITS
REPORTED DOSE/TIME,TMG: 800
SERVICE CMNT-IMP: ABNORMAL
SODIUM SERPL-SCNC: 141 MMOL/L (ref 136–145)
VANCOMYCIN TROUGH SERPL-MCNC: 9.6 UG/ML (ref 5–10)
WBC # BLD AUTO: 14.9 K/UL (ref 3.6–11)

## 2017-11-30 PROCEDURE — 71020 XR CHEST PA LAT: CPT

## 2017-11-30 PROCEDURE — 74011250637 HC RX REV CODE- 250/637: Performed by: INTERNAL MEDICINE

## 2017-11-30 PROCEDURE — 65660000000 HC RM CCU STEPDOWN

## 2017-11-30 PROCEDURE — 77010033678 HC OXYGEN DAILY

## 2017-11-30 PROCEDURE — 97165 OT EVAL LOW COMPLEX 30 MIN: CPT | Performed by: OCCUPATIONAL THERAPIST

## 2017-11-30 PROCEDURE — 82962 GLUCOSE BLOOD TEST: CPT

## 2017-11-30 PROCEDURE — 74011250636 HC RX REV CODE- 250/636: Performed by: INTERNAL MEDICINE

## 2017-11-30 PROCEDURE — 74011250637 HC RX REV CODE- 250/637: Performed by: NURSE PRACTITIONER

## 2017-11-30 PROCEDURE — 74000 XR ABD (KUB): CPT

## 2017-11-30 PROCEDURE — 97110 THERAPEUTIC EXERCISES: CPT | Performed by: OCCUPATIONAL THERAPIST

## 2017-11-30 PROCEDURE — 80048 BASIC METABOLIC PNL TOTAL CA: CPT | Performed by: INTERNAL MEDICINE

## 2017-11-30 PROCEDURE — 80202 ASSAY OF VANCOMYCIN: CPT | Performed by: INTERNAL MEDICINE

## 2017-11-30 PROCEDURE — 74011636637 HC RX REV CODE- 636/637: Performed by: INTERNAL MEDICINE

## 2017-11-30 PROCEDURE — 97116 GAIT TRAINING THERAPY: CPT

## 2017-11-30 PROCEDURE — 85025 COMPLETE CBC W/AUTO DIFF WBC: CPT | Performed by: INTERNAL MEDICINE

## 2017-11-30 PROCEDURE — 36415 COLL VENOUS BLD VENIPUNCTURE: CPT | Performed by: INTERNAL MEDICINE

## 2017-11-30 RX ORDER — POLYETHYLENE GLYCOL 3350 17 G/17G
17 POWDER, FOR SOLUTION ORAL 2 TIMES DAILY
Status: DISCONTINUED | OUTPATIENT
Start: 2017-11-30 | End: 2017-12-01 | Stop reason: HOSPADM

## 2017-11-30 RX ORDER — POLYETHYLENE GLYCOL 3350 17 G/17G
17 POWDER, FOR SOLUTION ORAL 2 TIMES DAILY
Status: DISCONTINUED | OUTPATIENT
Start: 2017-11-30 | End: 2017-11-30

## 2017-11-30 RX ADMIN — VANCOMYCIN HYDROCHLORIDE 1250 MG: 10 INJECTION, POWDER, LYOPHILIZED, FOR SOLUTION INTRAVENOUS at 20:54

## 2017-11-30 RX ADMIN — NYSTATIN 500000 UNITS: 100000 SUSPENSION ORAL at 12:40

## 2017-11-30 RX ADMIN — INSULIN LISPRO 2 UNITS: 100 INJECTION, SOLUTION INTRAVENOUS; SUBCUTANEOUS at 12:40

## 2017-11-30 RX ADMIN — GUAIFENESIN 600 MG: 600 TABLET, EXTENDED RELEASE ORAL at 20:53

## 2017-11-30 RX ADMIN — POLYETHYLENE GLYCOL 3350 17 G: 17 POWDER, FOR SOLUTION ORAL at 16:53

## 2017-11-30 RX ADMIN — ENOXAPARIN SODIUM 40 MG: 40 INJECTION SUBCUTANEOUS at 20:53

## 2017-11-30 RX ADMIN — GUAIFENESIN 600 MG: 600 TABLET, EXTENDED RELEASE ORAL at 08:22

## 2017-11-30 RX ADMIN — INSULIN LISPRO 2 UNITS: 100 INJECTION, SOLUTION INTRAVENOUS; SUBCUTANEOUS at 16:52

## 2017-11-30 RX ADMIN — ATORVASTATIN CALCIUM 40 MG: 10 TABLET, FILM COATED ORAL at 08:24

## 2017-11-30 RX ADMIN — Medication 10 ML: at 12:41

## 2017-11-30 RX ADMIN — NYSTATIN 500000 UNITS: 100000 SUSPENSION ORAL at 20:59

## 2017-11-30 RX ADMIN — ENOXAPARIN SODIUM 40 MG: 40 INJECTION SUBCUTANEOUS at 08:22

## 2017-11-30 RX ADMIN — OMEGA-3-ACID ETHYL ESTERS 4000 MG: 900 CAPSULE, LIQUID FILLED ORAL at 08:39

## 2017-11-30 RX ADMIN — VANCOMYCIN HYDROCHLORIDE 1250 MG: 10 INJECTION, POWDER, LYOPHILIZED, FOR SOLUTION INTRAVENOUS at 08:22

## 2017-11-30 RX ADMIN — ESCITALOPRAM OXALATE 20 MG: 10 TABLET ORAL at 08:23

## 2017-11-30 RX ADMIN — NYSTATIN 500000 UNITS: 100000 SUSPENSION ORAL at 16:53

## 2017-11-30 RX ADMIN — INSULIN LISPRO 2 UNITS: 100 INJECTION, SOLUTION INTRAVENOUS; SUBCUTANEOUS at 08:24

## 2017-11-30 RX ADMIN — POLYETHYLENE GLYCOL 3350 17 G: 17 POWDER, FOR SOLUTION ORAL at 12:40

## 2017-11-30 RX ADMIN — FERROUS SULFATE TAB 325 MG (65 MG ELEMENTAL FE) 325 MG: 325 (65 FE) TAB at 08:24

## 2017-11-30 RX ADMIN — DOCUSATE SODIUM AND SENNOSIDES 1 TABLET: 8.6; 5 TABLET, FILM COATED ORAL at 08:24

## 2017-11-30 RX ADMIN — Medication 10 ML: at 21:00

## 2017-11-30 RX ADMIN — NYSTATIN 500000 UNITS: 100000 SUSPENSION ORAL at 08:22

## 2017-11-30 RX ADMIN — ASPIRIN 81 MG: 81 TABLET, COATED ORAL at 08:22

## 2017-11-30 NOTE — PROGRESS NOTES
Problem: Mobility Impaired (Adult and Pediatric)  Goal: *Acute Goals and Plan of Care (Insert Text)  Physical Therapy Goals  Initiated 11/29/2017  1. Patient will move from supine to sit and sit to supine  in bed with  independence within 7 day(s). 2.  Patient will transfer from bed to chair and chair to bed with independence using the least restrictive device within 7 day(s). 3.  Patient will perform sit to stand with independence within 7 day(s). 4.  Patient will ambulate with modified independence for 150 feet with the least restrictive device within 7 day(s). physical Therapy TREATMENT  Patient: Kevin Hoang (18 y.o. female)  Date: 11/30/2017  Diagnosis: Sepsis ([address]  SEPTIC SHOULDER Sepsis ([address]  Procedure(s) (LRB):  SHOULDER ARTHROSCOPIC WASHOUT (Left) 3 Days Post-Op  Precautions: Fall, Other (comment) (left shoulder limb alert s/p arthroscopic draining))    ASSESSMENT:  Patient received sitting in bedside chair with  present at bedside, agreeable to PT. Patient completed transfers with supervision, ambulated 60ft with supervision to CGA due to fear regarding inability to complete upright activity despite O2 sats remaining stable on 3L. Patient desats with upright activity, see below. PT will continue to follow while patient remains hospitalized to address upright activity tolerance, continuing to recommend home health PT follow up services for carry over of pursed lip breathing into functional activity and improvement of activity tolerance.     Documentation for home O2:     ROOM AIR    AT REST   O2 SATS  89 HR  96   ROOM AIR WITH ACTIVITY 02 SATS  84 HR  106   (3    ) LITERS OF O2 WITH ACTIVITY O2 SATS  90 HR  112   (3    )LITERS OF 02 PATIENT LEFT COMFORTABLY  SITTING/SUPINE 02 SATS  93 HR  114     Progression toward goals:  []    Improving appropriately and progressing toward goals  [x]    Improving slowly and progressing toward goals  []    Not making progress toward goals and plan of care will be adjusted     PLAN:  Patient continues to benefit from skilled intervention to address the above impairments. Continue treatment per established plan of care. Discharge Recommendations:  Home Health  Further Equipment Recommendations for Discharge:  None     SUBJECTIVE:   Patient stated I need to give up.     OBJECTIVE DATA SUMMARY:   Critical Behavior:  Neurologic State: Alert  Orientation Level: Oriented X4  Cognition: Appropriate decision making, Appropriate for age attention/concentration, Appropriate safety awareness  Safety/Judgement: Awareness of environment, Fall prevention, Home safety, Insight into deficits  Functional Mobility Training:  Bed Mobility:     Supine to Sit: Minimum assistance     Scooting: Minimum assistance        Transfers:  Sit to Stand: Supervision  Stand to Sit: Supervision        Bed to Chair: Supervision                    Balance:  Sitting: Intact  Standing: Intact  Ambulation/Gait Training:  Distance (ft): 60 Feet (ft)  Assistive Device: Gait belt                    Base of Support: Narrowed     Speed/Chelsea: Slow;Fluctuations  Step Length: Left shortened;Right shortened                    Stairs:            Neuro Re-Education:    Therapeutic Exercises:     Pain:  Pain Scale 1: Numeric (0 - 10)  Pain Intensity 1: 0              Activity Tolerance:   Poor - patient desats with upright activity, requires 3L O2 to maintain O2 >90%  Please refer to the flowsheet for vital signs taken during this treatment.   After treatment:   [x]    Patient left in no apparent distress sitting up in chair  []    Patient left in no apparent distress in bed  [x]    Call bell left within reach  [x]    Nursing notified  []    Caregiver present  []    Bed alarm activated    COMMUNICATION/COLLABORATION:   The patients plan of care was discussed with: Registered Nurse    Diamond Bueno   Time Calculation: 17 mins      Regarding student involvement in patient care:  A student participated in this treatment session. Per CMS Medicare statements and APTA guidelines I certify that the following was true:  1. I was present and directly observed the entire session. 2. I made all skilled judgments and clinical decisions regarding care. 3. I am the practitioner responsible for assessment, treatment, and documentation.

## 2017-11-30 NOTE — PROGRESS NOTES
ORTHO:  No growth on culture. Pt. Was on abx therapy prior to aspiration which could skew results. Pt. Was clinically presenting with an infected joint. Improved now after washout and IV abx. Recommend ID consult and short vs long term abx per ID. Dr. Jose Rafael Ferguson agrees with plan.       Az Cat PA-C  Orthopaedic Surgery [address]

## 2017-11-30 NOTE — PROGRESS NOTES
[facility] [address], [place], [address] [facility]  [phone]      Medical Progress Note      NAME: Maria Fernanda Blackwell   :  1940  MRM:  [id number]    Date/Time: 2017  7:24 AM       Assessment and Plan:   1. Septic arthritis. S/p I&D on . On ABx. Culture si negative so far. Leukocytosis has improved. Orthopedics evaluation appreciated      2. Leukocytosis - likely secondary to above. Improving. Continue ABx. Evaluated by hematology      3. Hyperlipidemia (2013). On statin      4. Depression (2013)-continue Lexapro     5. Hyperglycemia due to type 2 diabetes mellitus ([facility] (2016). A1c is 7.9. Cover with SSI. Metformin is on hold. 6.  Obesity, morbid ([facility] (2017)-counseled on weight loss on admission    7. Lactic acidosis (2017) - likely secondary to #1, resolved. 8.  Tongue lesion, painful. Easily pealed part of it. Pt was evaluated by ENT and pt will follow with them for re evaluation and if the lesion continue show change plan for Bx as outpatient. 9.  Hx of COPD/ chronic respiratory failure. Pt is on 2LPM O2 at home. walking with PT, pt requires 3LPM O2 which will be arranged by CM.     10.  Constipation. Bowel regimen. Subjective:     Chief Complaint:  Follow up pt who was admitted with SIRS/ septic arthritis. nausea and constipation     ROS:  (bold if positive, if negative)      Tolerating PT  Tolerating Diet        Objective:     Last 24hrs VS reviewed since prior progress note.  Most recent are:    Visit Vitals    /67 (BP 1 Location: Right arm, BP Patient Position: At rest)    Pulse 90    Temp 97.6 °F (36.4 °C)    Resp 16    Ht 4' 8\" (1.422 m)    Wt 92.7 kg (204 lb 6.4 oz)    SpO2 90%    Breastfeeding No    BMI 45.83 kg/m2     SpO2 Readings from Last 6 Encounters:   17 90%   17 95%   16 94%   08/10/16 94%   16 96%   06/10/16 96%    O2 Flow Rate (L/min): 3 l/min Intake/Output Summary (Last 24 hours) at 11/30/17 1032  Last data filed at 11/29/17 1247   Gross per 24 hour   Intake              240 ml   Output                0 ml   Net              240 ml        Physical Exam:    Gen:  obese, in no acute distress  HEENT:  Pink conjunctivae, PERRL, hearing intact to voice, moist mucous membranes  Neck:  Supple, without masses, thyroid non-tender  Resp:  No accessory muscle use, clear breath sounds without wheezes rales or rhonchi  Card:  No murmurs, normal S1, S2 without thrills, bruits or peripheral edema  Abd:  Soft, non-tender, non-distended, normoactive bowel sounds are present, no palpable organomegaly and no detectable hernias  Lymph:  No cervical or inguinal adenopathy  Musc:  No cyanosis or clubbing  Skin:  No rashes or ulcers, skin turgor is good  Neuro:  Cranial nerves are grossly intact, no focal motor weakness, follows commands appropriately  Psych:  Good insight, oriented to person, place and time, alert  __________________________________________________________________  Medications Reviewed: (see below)  Medications:     Current Facility-Administered Medications   Medication Dose Route Frequency    nystatin (MYCOSTATIN) 100,000 unit/mL oral suspension 500,000 Units  500,000 Units Oral QID    senna-docusate (PERICOLACE) 8.6-50 mg per tablet 1 Tab  1 Tab Oral DAILY    polyethylene glycol (MIRALAX) packet 17 g  17 g Oral DAILY PRN    oxyCODONE-acetaminophen (PERCOCET) 5-325 mg per tablet 2 Tab  2 Tab Oral Q6H PRN    vancomycin (VANCOCIN) 1,250 mg in 0.9% sodium chloride 250 mL IVPB  1,250 mg IntraVENous Q12H    sodium chloride (NS) flush 5-10 mL  5-10 mL IntraVENous PRN    sodium chloride (NS) flush 5-10 mL  5-10 mL IntraVENous Q8H    sodium chloride (NS) flush 5-10 mL  5-10 mL IntraVENous PRN    acetaminophen (TYLENOL) tablet 650 mg  650 mg Oral Q4H PRN    naloxone (NARCAN) injection 0.4 mg  0.4 mg IntraVENous PRN    ondansetron (ZOFRAN) injection 4 mg  4 mg IntraVENous Q4H PRN    enoxaparin (LOVENOX) injection 40 mg  40 mg SubCUTAneous Q12H    insulin lispro (HUMALOG) injection   SubCUTAneous AC&HS    glucose chewable tablet 16 g  4 Tab Oral PRN    dextrose (D50W) injection syrg 12.5-25 g  12.5-25 g IntraVENous PRN    glucagon (GLUCAGEN) injection 1 mg  1 mg IntraMUSCular PRN    aspirin delayed-release tablet 81 mg  81 mg Oral DAILY    atorvastatin (LIPITOR) tablet 40 mg  40 mg Oral DAILY    escitalopram oxalate (LEXAPRO) tablet 20 mg  20 mg Oral DAILY    ferrous sulfate tablet 325 mg  325 mg Oral DAILY WITH BREAKFAST    omega-3 acid ethyl esters (LOVAZA) capsule 4,000 mg  4 g Oral DAILY WITH BREAKFAST    guaiFENesin ER (MUCINEX) tablet 600 mg  600 mg Oral Q12H    levoFLOXacin (LEVAQUIN) tablet 750 mg  750 mg Oral Q24H        Lab Data Reviewed: (see below)  Lab Review:     Recent Labs      11/30/17 0019 11/29/17 0100 11/28/17 0415   WBC  14.9*  16.5*  19.5*   HGB  8.1*  8.9*  8.8*   HCT  26.7*  28.1*  27.5*   PLT  296  302  271     Recent Labs      11/30/17 0019 11/29/17 0100 11/28/17   0415   NA  141   --   140   K  3.8   --   3.9   CL  107   --   106   CO2  29   --   24   GLU  148*   --   187*   BUN  9   --   8   CREA  0.65  0.72  0.74   CA  7.9*   --   7.9*   MG   --    --   1.5*     Lab Results   Component Value Date/Time    Glucose (POC) 172 11/30/2017 07:31 AM    Glucose (POC) 150 11/29/2017 09:00 PM    Glucose (POC) 153 11/29/2017 04:24 PM    Glucose (POC) 178 11/29/2017 11:10 AM    Glucose (POC) 187 11/29/2017 06:54 AM     No results for input(s): PH, PCO2, PO2, HCO3, FIO2 in the last 72 hours. No results for input(s): INR in the last 72 hours.     No lab exists for component: Benja Pennie  All Micro Results     Procedure Component Value Units Date/Time    CULTURE, BLOOD [[id number]] Collected:  11/27/17 1143    Order Status:  Completed Specimen:  Blood from Blood Updated:  11/30/17 0912     Special Requests: NO SPECIAL REQUESTS        Culture result: NO GROWTH 3 DAYS       CULTURE, BODY FLUID Blenda Cullman STAIN [[id number]] Collected:  11/27/17 1919    Order Status:  Completed Specimen:  Joint Fluid Updated:  11/29/17 0917     Special Requests: NO SPECIAL REQUESTS        GRAM STAIN RARE WBCS SEEN         NO ORGANISMS SEEN        Culture result:         Culture performed on Fluid swab specimen              No growth thus far, holding 14 days. CULTURE, RESPIRATORY/SPUTUM/BRONCH Reva Crystal [[id number]] Collected:  11/27/17 1500    Order Status:  Canceled Specimen:  Sputum from Sputum           I have reviewed notes of prior 24hr. Other pertinent lab:       Total time spent with patient: [address] discussed with: Patient, Nursing Staff and >50% of time spent in counseling and coordination of care    Discussed:  Care Plan    Prophylaxis:  Lovenox    Disposition:  Home w/Family           ___________________________________________________    Attending Physician: Shayy Moon MD

## 2017-11-30 NOTE — PROGRESS NOTES
Face-to-face was done with patient this morning and I explained to pt about home oxygen use and she is in agreement. Needs home O2 3LPM for COPD.  Pt has home O2 2LPM

## 2017-11-30 NOTE — ROUTINE PROCESS
Bedside and Verbal shift change report given to Luisa [facility] (oncoming nurse) by Kassie LOVE (offgoing nurse). Report included the following information SBAR, Kardex, Intake/Output and Recent Results.

## 2017-11-30 NOTE — PROGRESS NOTES
4618: Noted pt went into AF around 2040 when placed on remote tele last night. Called monitor tech who stated pt converted back to NSR @ 3204. Notified MD Yamilet Kaur. MD stated to keep pt on remote telemetry for now. Monitor tech to send tele strip to floor of AF run. Pt in NSR at this time.

## 2017-11-30 NOTE — ROUTINE PROCESS
11/30/17   10:35AM  Attempted to schedule hospital f/u appt. Practice refused to schedule unless pt is present in the room or calls herself.  Jose M Olmedo CM Specialist

## 2017-11-30 NOTE — PROGRESS NOTES
I have discussed pt with attending who is planning to possibly discharge pt tomorrow. Orders for oxygen faxed to [facility]. Pt already has continuous oxygen @ home through [facility] @ 2 liters. Her O2 has now been increased to 3 liters nc.     Tomorrow,prior to discharge,I will get a portable tank for pt.'s transport home out of our DME closet. Orders received for home health for home PT & OT. I will discuss choices with pt and set up home health for pt.     Andre Bloom  Team B  [phone]

## 2017-11-30 NOTE — PROGRESS NOTES
Problem: Self Care Deficits Care Plan (Adult)  Goal: *Acute Goals and Plan of Care (Insert Text)  Occupational Therapy Goals  Initiated 11/30/2017     1. Patient will perform left shoulder pendulum exercises per physician order with supervision/set-up and min cues within 7 days. 2.  Patient will perform upper body ADLs with supervision/set-up  within 7 days. 3. Patient will perform lower body dressing with supervision/set-up within 7 days. 4.  Patient will perform toilet transfers with independence within 7 days. Occupational Therapy EVALUATION  Patient: Kevin Hoang (85 y.o. female)  Date: 11/30/2017  Primary Diagnosis: Sepsis ([facility]  SEPTIC SHOULDER  Procedure(s) (LRB):  SHOULDER ARTHROSCOPIC WASHOUT (Left) 3 Days Post-Op   Precautions:   Fall, Other (comment) (left shoulder limb alert s/p arthroscopic draining))    ASSESSMENT :  Based on the objective data described below, the patient presents without complaints, agreeable to out of bed and instructed in pendulum exercises, however difficulty to perform and will benefit from further instruction. At this time feel she likely will not need follow up care, good support from . Patient will benefit from skilled intervention to address the above impairments.   Patients rehabilitation potential is considered to be Good  Factors which may influence rehabilitation potential include:   [x]             None noted  []             Mental ability/status  []             Medical condition  []             Home/family situation and support systems  []             Safety awareness  []             Pain tolerance/management  []             Other:      PLAN :  Recommendations and Planned Interventions:  [x]               Self Care Training                  [x]        Therapeutic Activities  [x]               Functional Mobility Training    []        Cognitive Retraining  [x]               Therapeutic Exercises           [x]        Endurance Activities  [x] Balance Training                   []        Neuromuscular Re-Education  []               Visual/Perceptual Training     [x]   Home Safety Training  [x]               Patient Education                 [x]        Family Training/Education  []               Other (comment):    Frequency/Duration: Patient will be followed by occupational therapy 5 times a week to address goals. Discharge Recommendations: Home Health, None and To Be Determined  Further Equipment Recommendations for Discharge: none     SUBJECTIVE:   Patient stated I guess I did, I slept all night.  when asked if she had a good night    OBJECTIVE DATA SUMMARY:   HISTORY:   Past Medical History:   Diagnosis Date    Hypercholesterolemia     Hyperglycemia due to type 2 diabetes mellitus ([facility] 8/4/2016    Ill-defined condition     cellulitis    Major depression     depression    Pneumonia     2011 or so    Transient ischemic attack      Past Surgical History:   Procedure Laterality Date    COLONOSCOPY  6/24/2016         COLONOSCOPY N/A 6/24/2016    COLONOSCOPY performed by Everton Mauro MD at [address] N/A 8/26/2016    SIGMOIDOSCOPY FLEXIBLE performed by Everton Mauro MD at [address] [facility] HX APPENDECTOMY      UPPER GI ENDOSCOPY,DIAGNOSIS  8/26/2016            Prior Level of Function/Environment/Context: independent, lives with her  in independent living, neither drive as they don't have a car    [address]: [place] [address] Name:  ([address] [place] [address] at [employer])  [address] [place] [address] St: One story (3rd floor  elevator access)  Living Alone: No (lives with )  Support Systems: Spouse/Significant Other/Partner  Patient Expects to be Discharged to[de-identified] Private residence  Current DME Used/Available at Home: [employer]  Tub or Shower Type: Shower  []  Right hand dominant   []  Left hand dominant    EXAMINATION OF PERFORMANCE DEFICITS:  Cognitive/Behavioral Status:  Neurologic State: Alert  Orientation Level: Oriented X4  Cognition: Appropriate decision making; Appropriate for age attention/concentration; Appropriate safety awareness  Perception: Appears intact  Perseveration: No perseveration noted  Safety/Judgement: Awareness of environment; Fall prevention;Home safety; Insight into deficits    Skin: dressing left shoulder intact    Edema: bilateral UE, left > right    Hearing: Auditory  Auditory Impairment: None    Vision/Perceptual:                                Corrective Lenses: Reading glasses    Range of Motion:  WFL right UE, left elbow, wrist and hand WFL, shoulder not assessed this date                            Strength:  WFL right UE                   Coordination:     Fine Motor Skills-Upper: Left Intact; Right Intact    Gross Motor Skills-Upper: Left Intact; Right Intact    Balance:  Sitting: Intact  Standing: Intact    Functional Mobility and Transfers for ADLs:  Bed Mobility:  Supine to Sit: Minimum assistance  Scooting: Minimum assistance    Transfers:  Sit to Stand: Supervision  Stand to Sit: Modified independent  Bed to Chair: Supervision  Toilet Transfer : Contact guard assistance    ADL Assessment:  Feeding: Independent    Oral Facial Hygiene/Grooming: Setup    Bathing: Minimum assistance    Upper Body Dressing: Setup;Minimum assistance    Lower Body Dressing: Minimum assistance; Additional time    Toileting: Minimum assistance                ADL Intervention and task modifications:   educated on role of OT, positioning in supine and seated to increase left UE support and comfort    Patient instructed and indicated understanding the benefits of maintaining activity tolerance, functional mobility, and independence with self care tasks during acute stay  to ensure safe return home and to baseline.  Encouraged patient to increase frequency and duration OOB, be out of bed for all meals, perform daily ADLs (as approved by RN/MD regarding bathing etc), and performing functional mobility to/from bathroom. Cognitive Retraining  Safety/Judgement: Awareness of environment; Fall prevention;Home safety; Insight into deficits    Therapeutic Exercise: Instructed standing edge of bed on pendulum exercises, first forward and back, then side to side, patient demonstrated difficulty understanding how to perform and limited by decreased standing tolerance. Attempted to have perform seated, however due to body habitus and oxygen needs unable to lean forward to perform. Will continue to instruct and provided written handout to increase understanding   Functional Measure:  Barthel Index:    Bathin  Bladder: 10  Bowels: 10  Groomin  Dressin  Feeding: 10  Mobility: 10  Stairs: 5  Toilet Use: 5  Transfer (Bed to Chair and Back): 10  Total: 70       Barthel and G-code impairment scale:  Percentage of impairment CH  0% CI  1-19% CJ  20-39% CK  40-59% CL  60-79% CM  80-99% CN  100%   Barthel Score 0-100 100 99-80 79-60 59-40 20-39 1-19   0   Barthel Score 0-20 20 17-19 13-16 9-12 5-8 1-4 0      The Barthel ADL Index: Guidelines  1. The index should be used as a record of what a patient does, not as a record of what a patient could do. 2. The main aim is to establish degree of independence from any help, physical or verbal, however minor and for whatever reason. 3. The need for supervision renders the patient not independent. 4. A patient's performance should be established using the best available evidence. Asking the patient, friends/relatives and nurses are the usual sources, but direct observation and common sense are also important. However direct testing is not needed. 5. Usually the patient's performance over the preceding 24-48 hours is important, but occasionally longer periods will be relevant. 6. Middle categories imply that the patient supplies over 50 per cent of the effort. 7. Use of aids to be independent is allowed. Sima Curry., Barthel, D. W. (5759). Functional evaluation: the Barthel Index. [address] [facility] (14)2. JEROME Baca, Josh Villagomez.[place], [facility], [facility] (1999). Measuring the change indisability after inpatient rehabilitation; comparison of the responsiveness of the Barthel Index and Functional [place] Measure. Journal of Neurology, Neurosurgery, and Psychiatry, 66(4), 745-538. SHAHBAZ Pritchard, PAULETTE Rodriguez, & Blaise Schirmer, M.A. (2004.) Assessment of post-stroke quality of life in cost-effectiveness studies: The usefulness of the Barthel Index and the EuroQoL-5D. Quality of Life Research, 13, 582-08         G codes: In compliance with CMSs Claims Based Outcome Reporting, the following G-code set was chosen for this patient based on their primary functional limitation being treated: The outcome measure chosen to determine the severity of the functional limitation was the Barthel Index with a score of 70/100 which was correlated with the impairment scale. ? Self Care:     - CURRENT STATUS: CJ - 20%-39% impaired, limited or restricted    - GOAL STATUS: CI - 1%-19% impaired, limited or restricted    - D/C STATUS:  ---------------To be determined---------------     Occupational Therapy Evaluation Charge Determination   History Examination Decision-Making   LOW Complexity : Brief history review  LOW Complexity : 1-3 performance deficits relating to physical, cognitive , or psychosocial skils that result in activity limitations and / or participation restrictions  LOW Complexity : No comorbidities that affect functional and no verbal or physical assistance needed to complete eval tasks       Based on the above components, the patient evaluation is determined to be of the following complexity level: LOW   Pain:   no significant complaint        Activity Tolerance:   Good on 3 liters  Please refer to the flowsheet for vital signs taken during this treatment.   After treatment:   [x] Patient left in no apparent distress sitting up in chair  [] Patient left in no apparent distress in bed  [x] Call bell left within reach  [x] Nursing notified  [] Caregiver present  [] Bed alarm activated    COMMUNICATION/EDUCATION:   The patients plan of care was discussed with: Physical Therapist and Registered Nurse. [x] Home safety education was provided and the patient/caregiver indicated understanding. [x] Patient/family have participated as able in goal setting and plan of care. [] Patient/family agree to work toward stated goals and plan of care. [] Patient understands intent and goals of therapy, but is neutral about his/her participation. [] Patient is unable to participate in goal setting and plan of care. This patients plan of care is appropriate for delegation to [facility].     Thank you for this referral.  Estiven Pereira OTR/L  Time Calculation: 23 mins

## 2017-11-30 NOTE — PROGRESS NOTES
Home Care Face to Face Encounter    Patients Name: Nuvia Rodríguez    YOB: 1940    Primary Diagnosis: septic arthitis    Date of Face to Face:   11/30/17                                  Face to Face Encounter findings are related to primary reason for home care:   yes. 1. I certify that the patient needs intermittent care as follows: physical therapy: strengthening, stretching/ROM, transfer training and gait/stair training  occupational therapy:  ADL safety (ie. cooking, bathing, dressing) and ROM    2. I certify that this patient is homebound, that is: 1) patient requires the use of a walker device, special transportation, or assistance of another to leave the home; or 2) patient's condition makes leaving the home medically contraindicated; and 3) patient has a normal inability to leave the home and leaving the home requires considerable and taxing effort. Patient may leave the home for infrequent and short duration for medical reasons, and occasional absences for non-medical reasons. Homebound status is due to the following functional limitations: Patient's ambulation limited secondary to severe pain and requires the use of an assistive device and the assistance of a caregiver for safe completion. Patient with strength and ROM deficits limiting ambulation endurance requiring the use of an assistive device and the assistance of a caregiver. Patient deemed temporarily homebound secondary to increased risk for infection when leaving home and going out into the community. 3. I certify that this patient is under my care and that I, or a nurse practitioner or  [id number], or clinical nurse specialist, or certified nurse midwife, working with me, had a Face-to-Face Encounter that meets the physician Face-to-Face Encounter requirements.   The following are the clinical findings from the [address] [place] [address] encounter that support the need for skilled services and is a summary of the encounter: See discharge summary      Sean Russell MD  11/30/2017

## 2017-11-30 NOTE — PROGRESS NOTES
[facility] 79  566 [facility], [address] [place] [address]  [phone]      Medical Progress Note      NAME: Ammy Sánchez   :  1940  MRM:  [id number]    Date/Time: 2017  7:24 AM       Assessment and Plan:   1. Septic arthritis. S/p I&D on . On ABx. Culture si negative so far. Leukocytosis has improved. Orthopedics evaluation appreciated      2. Leukocytosis - likely secondary to above. Improving. Continue ABx. Evaluated by hematology      3. Hyperlipidemia (2013). On statin      4. Depression (2013)-continue Lexapro     5. Hyperglycemia due to type 2 diabetes mellitus ([facility] (2016). A1c is 7.9. Cover with SSI. Metformin is on hold. 6.  Obesity, morbid ([facility] (2017)-counseled on weight loss on admission    7. Lactic acidosis (2017) - likely secondary to #1, resolved. 8.  Tongue lesion, painful. Easily pealed part of it. Pt was evaluated by ENT and pt will follow with them for re evaluation and if the lesion continue show change plan for Bx as outpatient. 9.  Hx of COPD/ chronic respiratory failure. Pt is on 2LPM O2 at home. walking with PT, pt requires 3LPM O2 which will be arranged by CM. Subjective:     Chief Complaint:  Follow up pt who was admitted with SIRS/ septic arthritis. Tongue pain and nausea    ROS:  (bold if positive, if negative)      Tolerating PT  Tolerating Diet        Objective:     Last 24hrs VS reviewed since prior progress note.  Most recent are:    Visit Vitals    /67 (BP 1 Location: Right arm, BP Patient Position: At rest)    Pulse 90    Temp 97.6 °F (36.4 °C)    Resp 16    Ht 4' 8\" (1.422 m)    Wt 92.7 kg (204 lb 6.4 oz)    SpO2 90%    Breastfeeding No    BMI 45.83 kg/m2     SpO2 Readings from Last 6 Encounters:   17 90%   17 95%   16 94%   08/10/16 94%   16 96%   06/10/16 96%    O2 Flow Rate (L/min): 3 l/min       Intake/Output Summary (Last 24 hours) at 11/30/17 3527  Last data filed at 11/29/17 1247   Gross per 24 hour   Intake              240 ml   Output                0 ml   Net              240 ml        Physical Exam:    Gen:  obese, in no acute distress  HEENT:  Pink conjunctivae, PERRL, hearing intact to voice, moist mucous membranes  Neck:  Supple, without masses, thyroid non-tender  Resp:  No accessory muscle use, clear breath sounds without wheezes rales or rhonchi  Card:  No murmurs, normal S1, S2 without thrills, bruits or peripheral edema  Abd:  Soft, non-tender, non-distended, normoactive bowel sounds are present, no palpable organomegaly and no detectable hernias  Lymph:  No cervical or inguinal adenopathy  Musc:  No cyanosis or clubbing  Skin:  No rashes or ulcers, skin turgor is good  Neuro:  Cranial nerves are grossly intact, no focal motor weakness, follows commands appropriately  Psych:  Good insight, oriented to person, place and time, alert  __________________________________________________________________  Medications Reviewed: (see below)  Medications:     Current Facility-Administered Medications   Medication Dose Route Frequency    nystatin (MYCOSTATIN) 100,000 unit/mL oral suspension 500,000 Units  500,000 Units Oral QID    senna-docusate (PERICOLACE) 8.6-50 mg per tablet 1 Tab  1 Tab Oral DAILY    polyethylene glycol (MIRALAX) packet 17 g  17 g Oral DAILY PRN    oxyCODONE-acetaminophen (PERCOCET) 5-325 mg per tablet 2 Tab  2 Tab Oral Q6H PRN    vancomycin (VANCOCIN) 1,250 mg in 0.9% sodium chloride 250 mL IVPB  1,250 mg IntraVENous Q12H    sodium chloride (NS) flush 5-10 mL  5-10 mL IntraVENous PRN    sodium chloride (NS) flush 5-10 mL  5-10 mL IntraVENous Q8H    sodium chloride (NS) flush 5-10 mL  5-10 mL IntraVENous PRN    acetaminophen (TYLENOL) tablet 650 mg  650 mg Oral Q4H PRN    naloxone (NARCAN) injection 0.4 mg  0.4 mg IntraVENous PRN    ondansetron (ZOFRAN) injection 4 mg  4 mg IntraVENous Q4H PRN    enoxaparin (LOVENOX) injection 40 mg  40 mg SubCUTAneous Q12H    insulin lispro (HUMALOG) injection   SubCUTAneous AC&HS    glucose chewable tablet 16 g  4 Tab Oral PRN    dextrose (D50W) injection syrg 12.5-25 g  12.5-25 g IntraVENous PRN    glucagon (GLUCAGEN) injection 1 mg  1 mg IntraMUSCular PRN    aspirin delayed-release tablet 81 mg  81 mg Oral DAILY    atorvastatin (LIPITOR) tablet 40 mg  40 mg Oral DAILY    escitalopram oxalate (LEXAPRO) tablet 20 mg  20 mg Oral DAILY    ferrous sulfate tablet 325 mg  325 mg Oral DAILY WITH BREAKFAST    omega-3 acid ethyl esters (LOVAZA) capsule 4,000 mg  4 g Oral DAILY WITH BREAKFAST    guaiFENesin ER (MUCINEX) tablet 600 mg  600 mg Oral Q12H    levoFLOXacin (LEVAQUIN) tablet 750 mg  750 mg Oral Q24H        Lab Data Reviewed: (see below)  Lab Review:     Recent Labs      11/30/17 0019 11/29/17 0100 11/28/17   0415   WBC  14.9*  16.5*  19.5*   HGB  8.1*  8.9*  8.8*   HCT  26.7*  28.1*  27.5*   PLT  296  302  271     Recent Labs      11/30/17 0019 11/29/17 0100  11/28/17   0415   NA  141   --   140   K  3.8   --   3.9   CL  107   --   106   CO2  29   --   24   GLU  148*   --   187*   BUN  9   --   8   CREA  0.65  0.72  0.74   CA  7.9*   --   7.9*   MG   --    --   1.5*     Lab Results   Component Value Date/Time    Glucose (POC) 172 11/30/2017 07:31 AM    Glucose (POC) 150 11/29/2017 09:00 PM    Glucose (POC) 153 11/29/2017 04:24 PM    Glucose (POC) 178 11/29/2017 11:10 AM    Glucose (POC) 187 11/29/2017 06:54 AM     No results for input(s): PH, PCO2, PO2, HCO3, FIO2 in the last 72 hours. No results for input(s): INR in the last 72 hours.     No lab exists for component: Jaylin Howe  All Micro Results     Procedure Component Value Units Date/Time    CULTURE, BLOOD [[id number]] Collected:  11/27/17 1143    Order Status:  Completed Specimen:  Blood from Blood Updated:  11/30/17 0912     Special Requests: NO SPECIAL REQUESTS        Culture result: NO GROWTH 3 DAYS       CULTURE, BODY FLUID W Tiffany Vital [[id number]] Collected:  11/27/17 1919    Order Status:  Completed Specimen:  Joint Fluid Updated:  11/29/17 0917     Special Requests: NO SPECIAL REQUESTS        GRAM STAIN RARE WBCS SEEN         NO ORGANISMS SEEN        Culture result:         Culture performed on Fluid swab specimen              No growth thus far, holding 14 days. CULTURE, RESPIRATORY/SPUTUM/BRONCH [place] Magalys [[id number]] Collected:  11/27/17 1500    Order Status:  Canceled Specimen:  Sputum from Sputum           I have reviewed notes of prior 24hr. Other pertinent lab:       Total time spent with patient: [address] discussed with: Patient, Nursing Staff and >50% of time spent in counseling and coordination of care    Discussed:  Care Plan    Prophylaxis:  Lovenox    Disposition:  Home w/Family           ___________________________________________________    Attending Physician: Josué Blanco MD

## 2017-11-30 NOTE — PROGRESS NOTES
Orthopaedic Progress Note  Post Op day: 3 Days Post-Op    2017 11:30 AM     Patient: Jey Stauffer MRN: [id number]  SSN: [id number]    YOB: 1940  Age: 69 y.o. Sex: female      Admit date:  2017  Date of Surgery:  2017   Procedures:  Procedure(s):  SHOULDER ARTHROSCOPIC WASHOUT  Admitting Physician:  Antonio Farmer MD   Surgeon:  Freddy Sotelo) and Role:     [employer]. Fransico Ortiz MD - Primary    Consulting Physician(s): Treatment Team: Attending Provider: Fernie Rosales MD; Consulting Provider: Antonio Farmer MD; Care Manager: Alicja Gomez, KATE; Consulting Provider: Pio Vasquez NP; Consulting Provider: Adelfo Babb MD; Surgeon: Katiana Ortiz MD; Utilization Review: Colin Ponce RN; Care Manager: Royal James    SUBJECTIVE:     Jey Stauffer is a 69 y.o. female is 3 Days Post-Op s/p Procedure(s):  SHOULDER ARTHROSCOPIC WASHOUT with an appropriate level of post-operative pain. No complaints of nausea, vomiting, dizziness, lightheadedness, chest pain, or shortness of breath. OBJECTIVE:       Physical Exam:  General: Alert, cooperative, no distress. Respiratory: Respirations unlabored  Neurological:  Neurovascular exam within normal limits. Motor: + DF/PF. Musculoskeletal: Calves soft, supple, non-tender upon palpation. Dressing/Wound:  Clean, dry and intact. No significant erythema or swelling.       Vital Signs:      Patient Vitals for the past 8 hrs:   BP Temp Pulse Resp SpO2 Weight   17 0821 - - - - - 92.7 kg (204 lb 6.4 oz)   17 0714 117/67 97.6 °F (36.4 °C) 90 16 90 % -   17 0427 - - 84 - - -   17 0404 124/73 98.1 °F (36.7 °C) 87 16 92 % -                                          Temp (24hrs), Av.7 °F (37.1 °C), Min:97.6 °F (36.4 °C), Max:99.6 °F (37.6 °C)      Labs:        Recent Labs      17   0019   HCT  26.7*   HGB  8.1*     Lab Results   Component Value Date/Time    Sodium 141 11/30/2017 12:19 AM    Potassium 3.8 11/30/2017 12:19 AM    Chloride 107 11/30/2017 12:19 AM    CO2 29 11/30/2017 12:19 AM    Glucose 148 11/30/2017 12:19 AM    BUN 9 11/30/2017 12:19 AM    Creatinine 0.65 11/30/2017 12:19 AM    Calcium 7.9 11/30/2017 12:19 AM       PT/OT:        Gait  Base of Support: Narrowed  Speed/Chelsea: Slow, Fluctuations  Step Length: Left shortened, Right shortened  Ambulation - Level of Assistance: Contact guard assistance  Distance (ft): 60 Feet (ft)  Assistive Device: Gait belt       Patient mobility  Bed Mobility Training  Rolling: Modified independent  Supine to Sit: Minimum assistance  Sit to Supine: Modified independent  Scooting: Minimum assistance  Transfer Training  Sit to Stand: Supervision  Stand to Sit: Supervision  Bed to Chair: Supervision      Gait Training  Assistive Device: Gait belt  Ambulation - Level of Assistance: Contact guard assistance  Distance (ft): 60 Feet (ft)            ASSESSMENT / PLAN:   Principal Problem:    Sepsis ([facility] (11/27/2017)    Active Problems:    Hyperlipidemia (12/20/2013)      Depression (12/21/2013)      Hyperglycemia due to type 2 diabetes mellitus ([facility] (8/4/2016)      Obesity, morbid ([facility] (11/27/2017)      Lactic acidosis (11/27/2017)                    Pain Control: Adequate with oral agents    DVT Prophylaxis: Lovenox daily, SCDs   Therapy/ Weight bearing: Pendulum of LUE only   Wound/ incisional issues: C, D and I   Medical concerns:    Disposition: Home w/ Family when cleared by therapy and medicine. Follow up with Dr. Precious Burgos in 2 weeks. Call [phone] for appt.     Signed By:  Ney Evans PA-C    [id number] [facility]

## 2017-11-30 NOTE — CONSULTS
Patient Information    Saba Aranda / [id number] : 1940    Admit Date 2017         Otolaryngology Consult    Subjective: tongue lesion     Date of Consultation:  2017    Referring Physician: Dr. Sydnie Mullen    History of Present Illness:   Patient is a 69 y.o. female who is being seen for a tongue lesion. The patient was admitted to the hospital for [facility]epsis ([facility]  SEPTIC SHOULDER. Admitted 2 days ago. Developed tongue soreness and a white spot. Has gotten better over past 24 hours. No prior history. No bleeding. Past Medical History:   Diagnosis Date    Hypercholesterolemia     Hyperglycemia due to type 2 diabetes mellitus ([facility] 2016    Ill-defined condition     cellulitis    Major depression     depression    Pneumonia      or so    Transient ischemic attack       History reviewed. No pertinent family history.    Social History   Substance Use Topics    Smoking status: Former Smoker    Smokeless tobacco: Not on file    Alcohol use No     Past Surgical History:   Procedure Laterality Date    COLONOSCOPY  2016         COLONOSCOPY N/A 2016    COLONOSCOPY performed by Cruzito Tapia MD at [address] [place] [address] N/A 2016    SIGMOIDOSCOPY FLEXIBLE [facility] at [facility] ENDOSCOPY    HX APPENDECTOMY      UPPER GI ENDOSCOPY,DIAGNOSIS  2016           Current Facility-Administered Medications   Medication Dose Route Frequency    nystatin (MYCOSTATIN) 100,000 unit/mL oral suspension 500,000 Units  500,000 Units Oral QID    senna-docusate (PERICOLACE) 8.6-50 mg per tablet 1 Tab  1 Tab Oral DAILY    polyethylene glycol (MIRALAX) packet 17 g  17 g Oral DAILY PRN    diphenhydrAMINE (BENADRYL) capsule 25 mg  25 mg Oral ONCE    oxyCODONE-acetaminophen (PERCOCET) 5-325 mg per tablet 2 Tab  2 Tab Oral Q6H PRN    vancomycin (VANCOCIN) 1,250 mg in 0.9% sodium chloride 250 mL IVPB  1,250 mg IntraVENous Q12H    sodium chloride (NS) flush 5-10 mL  5-10 mL IntraVENous PRN    sodium chloride (NS) flush 5-10 mL  5-10 mL IntraVENous Q8H    sodium chloride (NS) flush 5-10 mL  5-10 mL IntraVENous PRN    acetaminophen (TYLENOL) tablet 650 mg  650 mg Oral Q4H PRN    naloxone (NARCAN) injection 0.4 mg  0.4 mg IntraVENous PRN    ondansetron (ZOFRAN) injection 4 mg  4 mg IntraVENous Q4H PRN    enoxaparin (LOVENOX) injection 40 mg  40 mg SubCUTAneous Q12H    insulin lispro (HUMALOG) injection   SubCUTAneous AC&HS    glucose chewable tablet 16 g  4 Tab Oral PRN    dextrose (D50W) injection syrg 12.5-25 g  12.5-25 g IntraVENous PRN    glucagon (GLUCAGEN) injection 1 mg  1 mg IntraMUSCular PRN    aspirin delayed-release tablet 81 mg  81 mg Oral DAILY    atorvastatin (LIPITOR) tablet 40 mg  40 mg Oral DAILY    escitalopram oxalate (LEXAPRO) tablet 20 mg  20 mg Oral DAILY    ferrous sulfate tablet 325 mg  325 mg Oral DAILY WITH BREAKFAST    omega-3 acid ethyl esters (LOVAZA) capsule 4,000 mg  4 g Oral DAILY WITH BREAKFAST    guaiFENesin ER (MUCINEX) tablet 600 mg  600 mg Oral Q12H    levoFLOXacin (LEVAQUIN) tablet 750 mg  750 mg Oral Q24H      No Known Allergies       Review of Systems:  Pertinent items are noted in the History of Present Illness. Objective:     Vitals  Patient Vitals for the past 8 hrs:   BP Temp Pulse Resp SpO2   17 1957 123/68 99.6 °F (37.6 °C) 88 20 98 %   17 1540 (!) 139/92 98.6 °F (37 °C) 87 22 96 %   17 1247 127/55 - 86 - 96 %     Temp (24hrs), Av.3 °F (37.4 °C), Min:98.6 °F (37 °C), Max:99.7 °F (37.6 °C)     0701 -  1900  In: 5 [P.O.:720]  Out: -     PHYSICAL EXAM:    CONSTITUTIONAL:  Well nourished individual in no acute distress. The patient is able to communicate with normal voice quality. HEAD AND NECK EXAM:    HEAD & FACE: No head or facial abnormalities present. No masses or lesions present.   Overall appearance is normal. Facial strength is normal.  EYES: Conjunctiva normal.  No abnormalities of the lids or globes. Extraocular movements intact and conjugate. EARS: No significant abnormality of the external ear. NOSE: No significant external nasal lesions. No turbinate hypertrophy or mucosal lesions. No polyps, masses or purulence seen anteriorly. No edema. No significant septal deviation. SINUSES: The paranasal sinus regions are non-tender to palpation. ORAL CAVITY/OROPHARYNX: 1cm area of fibrinous exudate left anterior ventral tongue. No other lesions. NECK: No palpable lymphadenopathy or other masses in the neck. The trachea and larynx are midline. No thyroid enlargement or nodules appreciated. No abnormality of the parotid or submandibular glands present. LYMPHATIC: No lymphadenopathy in the neck/head. NEUROLOGIC/PSYCHIATRIC:    NEUROLOGIC:  Cranial nerves 3, 4, 5, 6, 7, 10 (soft palate elevation), 11 and 12 bilaterally intact and symmetric. ORIENTATION/MOOD/AFFECT: Oriented to person, place, time and general circumstances. Mood and affect appropriate. Assessment:   Tongue ulcer    Plan:     The spot under her tongue appears to be a healing ulcer. There is fibrinous exudate covering the spot. She notes this seems to be getting better. Could have been an aphthous type ulcer. I don't suspect malignancy. However, if the lesion persists after discharge, I am happy to see her in the clinic for potential biopsy. Signed By: Robel Lewis MD     2017      Phoenix Rivero MD  [facility], [facility] Facial Plastic Surgery  [email or web address]  [email or web address]  [address] [place] [address], [address] [place] [address]  Ph:  [phone]  Fax: [phone]        _______________________________________________________________  Demographics:   Sofia Dickson  :  1940  [address]  [address] [facility] [address] [address]  [phone] (home)  There is no such number on file (mobile). There is no work phone number on file.     Insurance Information                [facility] [address] HMO Phone:     Subscriber: Sandy Alatorre Subscriber#: [id number]    Group#:  Precert#:           Extended Emergency Contact Information  Primary Emergency Contact: Deshaun Rider  Address: [address] [place] [address], [address] [facility] Phone: [phone]  Relation: Spouse  Secondary Emergency Contact: Caitlin Arambula [address] Phone: [phone]  Relation: Daughter

## 2017-11-30 NOTE — PROGRESS NOTES
I met with pt to discuss home health needs and to discuss pt going home with continuous oxygen tomorrow. I informed pt that if she is not able to bring a portable tank in from her closet @ home,[facility] informed me that pt can receive one from the [facility] closet. I discussed pt with clin spec and will deliver a portable tank to pt.'s room in am.  In terms of home health,pt has chosen [facility] for home PT and OT. Order faxed through our cc link to [facility] with an invite for home health liason nurse to visit pt.     Leigh Ann Hewitt  Team B  [phone]

## 2017-12-01 ENCOUNTER — HOME HEALTH ADMISSION (OUTPATIENT)
Dept: HOME HEALTH SERVICES | Facility: HOME HEALTH | Age: 77
End: 2017-12-01
Payer: MEDICARE

## 2017-12-01 ENCOUNTER — APPOINTMENT (OUTPATIENT)
Dept: GENERAL RADIOLOGY | Age: 77
DRG: 549 | End: 2017-12-01
Attending: INTERNAL MEDICINE
Payer: MEDICARE

## 2017-12-01 VITALS
OXYGEN SATURATION: 87 % | TEMPERATURE: 98 F | SYSTOLIC BLOOD PRESSURE: 105 MMHG | BODY MASS INDEX: 45.98 KG/M2 | WEIGHT: 204.4 LBS | HEART RATE: 76 BPM | RESPIRATION RATE: 16 BRPM | HEIGHT: 56 IN | DIASTOLIC BLOOD PRESSURE: 71 MMHG

## 2017-12-01 PROBLEM — E87.20 LACTIC ACIDOSIS: Status: RESOLVED | Noted: 2017-11-27 | Resolved: 2017-12-01

## 2017-12-01 PROBLEM — A41.9 SEPSIS (HCC): Status: RESOLVED | Noted: 2017-11-27 | Resolved: 2017-12-01

## 2017-12-01 PROBLEM — M00.9 SEPTIC ARTHRITIS (HCC): Status: ACTIVE | Noted: 2017-12-01

## 2017-12-01 LAB
BACKGROUND: 489207: NORMAL
BASOPHILS # BLD: 0.1 K/UL (ref 0–0.1)
BASOPHILS NFR BLD: 1 % (ref 0–1)
CREAT SERPL-MCNC: 0.58 MG/DL (ref 0.55–1.02)
DIFFERENTIAL METHOD BLD: ABNORMAL
DIRECTOR REVIEW: 489204: NORMAL
EOSINOPHIL # BLD: 0.7 K/UL (ref 0–0.4)
EOSINOPHIL NFR BLD: 5 % (ref 0–7)
ERYTHROCYTE [DISTWIDTH] IN BLOOD BY AUTOMATED COUNT: 16.2 % (ref 11.5–14.5)
GLUCOSE BLD STRIP.AUTO-MCNC: 140 MG/DL (ref 65–100)
GLUCOSE BLD STRIP.AUTO-MCNC: 147 MG/DL (ref 65–100)
GLUCOSE BLD STRIP.AUTO-MCNC: 159 MG/DL (ref 65–100)
HCT VFR BLD AUTO: 25.6 % (ref 35–47)
HGB BLD-MCNC: 8.2 G/DL (ref 11.5–16)
JAK2 P.V617F BLD/T QL: NORMAL
LYMPHOCYTES # BLD: 3.1 K/UL (ref 0.8–3.5)
LYMPHOCYTES NFR BLD: 22 % (ref 12–49)
MCH RBC QN AUTO: 28.3 PG (ref 26–34)
MCHC RBC AUTO-ENTMCNC: 32 G/DL (ref 30–36.5)
MCV RBC AUTO: 88.3 FL (ref 80–99)
MONOCYTES # BLD: 1.4 K/UL (ref 0–1)
MONOCYTES NFR BLD: 10 % (ref 5–13)
NEUTS SEG # BLD: 8.6 K/UL (ref 1.8–8)
NEUTS SEG NFR BLD: 62 % (ref 32–75)
PLATELET # BLD AUTO: 347 K/UL (ref 150–400)
RBC # BLD AUTO: 2.9 M/UL (ref 3.8–5.2)
RBC MORPH BLD: ABNORMAL
SERVICE CMNT-IMP: ABNORMAL
WBC # BLD AUTO: 13.9 K/UL (ref 3.6–11)

## 2017-12-01 PROCEDURE — 02HV33Z INSERTION OF INFUSION DEVICE INTO SUPERIOR VENA CAVA, PERCUTANEOUS APPROACH: ICD-10-PCS | Performed by: INTERNAL MEDICINE

## 2017-12-01 PROCEDURE — 74011250637 HC RX REV CODE- 250/637: Performed by: INTERNAL MEDICINE

## 2017-12-01 PROCEDURE — 36569 INSJ PICC 5 YR+ W/O IMAGING: CPT | Performed by: INTERNAL MEDICINE

## 2017-12-01 PROCEDURE — 93308 TTE F-UP OR LMTD: CPT

## 2017-12-01 PROCEDURE — 85025 COMPLETE CBC W/AUTO DIFF WBC: CPT | Performed by: INTERNAL MEDICINE

## 2017-12-01 PROCEDURE — 74011250636 HC RX REV CODE- 250/636: Performed by: INTERNAL MEDICINE

## 2017-12-01 PROCEDURE — 77010033678 HC OXYGEN DAILY

## 2017-12-01 PROCEDURE — 74011636637 HC RX REV CODE- 636/637: Performed by: INTERNAL MEDICINE

## 2017-12-01 PROCEDURE — 74011250637 HC RX REV CODE- 250/637: Performed by: NURSE PRACTITIONER

## 2017-12-01 PROCEDURE — 82565 ASSAY OF CREATININE: CPT | Performed by: INTERNAL MEDICINE

## 2017-12-01 PROCEDURE — 77030018786 HC NDL GD F/USND BARD -B

## 2017-12-01 PROCEDURE — 74011000258 HC RX REV CODE- 258: Performed by: INTERNAL MEDICINE

## 2017-12-01 PROCEDURE — 82962 GLUCOSE BLOOD TEST: CPT

## 2017-12-01 PROCEDURE — 36415 COLL VENOUS BLD VENIPUNCTURE: CPT | Performed by: INTERNAL MEDICINE

## 2017-12-01 PROCEDURE — 97116 GAIT TRAINING THERAPY: CPT

## 2017-12-01 PROCEDURE — 76937 US GUIDE VASCULAR ACCESS: CPT

## 2017-12-01 PROCEDURE — 77030018719 HC DRSG PTCH ANTIMIC J&J -A

## 2017-12-01 PROCEDURE — C1751 CATH, INF, PER/CENT/MIDLINE: HCPCS

## 2017-12-01 RX ORDER — SODIUM CHLORIDE 0.9 % (FLUSH) 0.9 %
10 SYRINGE (ML) INJECTION EVERY 24 HOURS
Status: DISCONTINUED | OUTPATIENT
Start: 2017-12-01 | End: 2017-12-01 | Stop reason: HOSPADM

## 2017-12-01 RX ORDER — SODIUM CHLORIDE 0.9 % (FLUSH) 0.9 %
10-40 SYRINGE (ML) INJECTION EVERY 8 HOURS
Status: DISCONTINUED | OUTPATIENT
Start: 2017-12-01 | End: 2017-12-01 | Stop reason: HOSPADM

## 2017-12-01 RX ORDER — SODIUM CHLORIDE 0.9 % (FLUSH) 0.9 %
10 SYRINGE (ML) INJECTION AS NEEDED
Status: DISCONTINUED | OUTPATIENT
Start: 2017-12-01 | End: 2017-12-01 | Stop reason: HOSPADM

## 2017-12-01 RX ORDER — SODIUM CHLORIDE 0.9 % (FLUSH) 0.9 %
10-30 SYRINGE (ML) INJECTION AS NEEDED
Status: DISCONTINUED | OUTPATIENT
Start: 2017-12-01 | End: 2017-12-01 | Stop reason: HOSPADM

## 2017-12-01 RX ORDER — SODIUM CHLORIDE 0.9 % (FLUSH) 0.9 %
20 SYRINGE (ML) INJECTION EVERY 24 HOURS
Status: DISCONTINUED | OUTPATIENT
Start: 2017-12-01 | End: 2017-12-01 | Stop reason: HOSPADM

## 2017-12-01 RX ADMIN — POLYETHYLENE GLYCOL 3350 17 G: 17 POWDER, FOR SOLUTION ORAL at 08:56

## 2017-12-01 RX ADMIN — FERROUS SULFATE TAB 325 MG (65 MG ELEMENTAL FE) 325 MG: 325 (65 FE) TAB at 08:56

## 2017-12-01 RX ADMIN — CEFTRIAXONE 2 G: 2 INJECTION, POWDER, FOR SOLUTION INTRAMUSCULAR; INTRAVENOUS at 10:32

## 2017-12-01 RX ADMIN — ENOXAPARIN SODIUM 40 MG: 40 INJECTION SUBCUTANEOUS at 08:56

## 2017-12-01 RX ADMIN — OMEGA-3-ACID ETHYL ESTERS 4000 MG: 900 CAPSULE, LIQUID FILLED ORAL at 08:56

## 2017-12-01 RX ADMIN — DOCUSATE SODIUM AND SENNOSIDES 1 TABLET: 8.6; 5 TABLET, FILM COATED ORAL at 08:57

## 2017-12-01 RX ADMIN — VANCOMYCIN HYDROCHLORIDE 1250 MG: 10 INJECTION, POWDER, LYOPHILIZED, FOR SOLUTION INTRAVENOUS at 14:16

## 2017-12-01 RX ADMIN — Medication 5 ML: at 04:50

## 2017-12-01 RX ADMIN — NYSTATIN 500000 UNITS: 100000 SUSPENSION ORAL at 08:56

## 2017-12-01 RX ADMIN — VANCOMYCIN HYDROCHLORIDE 1250 MG: 10 INJECTION, POWDER, LYOPHILIZED, FOR SOLUTION INTRAVENOUS at 04:45

## 2017-12-01 RX ADMIN — INSULIN LISPRO 2 UNITS: 100 INJECTION, SOLUTION INTRAVENOUS; SUBCUTANEOUS at 13:00

## 2017-12-01 RX ADMIN — ASPIRIN 81 MG: 81 TABLET, COATED ORAL at 08:57

## 2017-12-01 RX ADMIN — INSULIN LISPRO 2 UNITS: 100 INJECTION, SOLUTION INTRAVENOUS; SUBCUTANEOUS at 08:56

## 2017-12-01 RX ADMIN — GUAIFENESIN 600 MG: 600 TABLET, EXTENDED RELEASE ORAL at 08:57

## 2017-12-01 RX ADMIN — NYSTATIN 500000 UNITS: 100000 SUSPENSION ORAL at 13:00

## 2017-12-01 RX ADMIN — ATORVASTATIN CALCIUM 40 MG: 10 TABLET, FILM COATED ORAL at 08:57

## 2017-12-01 RX ADMIN — ESCITALOPRAM OXALATE 20 MG: 10 TABLET ORAL at 08:57

## 2017-12-01 NOTE — PROGRESS NOTES
I appreciate Dr Pollack Anthony orders for home iv antibiotics . Order to add skilled nursing for management of home iv antibiotics sent through our cc link to [facility]. Order with clinicals also faxed to [facility] through [facility]  and to [facility] @ [phone]. Waiting on confirmation. I notified attending about need for PICC order. Once all is finalized,I will notify attending & nurse.   Gurjit Molina  [phone]  Team B

## 2017-12-01 NOTE — PROGRESS NOTES
In am rounds with attending,it was also discussed that pt will be evaluated by ID today to see if pt will need to go home on iv antibiotics. If this is the case,pt will need PIC placed. Hopefully,as pt.'s cultures are negative,she will go home on po antibiotics. Order obtained for home health for PT and OT to start on Monday by Dr Carroll Clay. This has been communicated to [place] with [facility]. Nursing,Please call me @ [phone] if pt receives discharge ordes so I can bring pt a portable tank to her room if needed which has been approved by [facility].     Thank you,  Elaine Stephens  Team [phone]

## 2017-12-01 NOTE — PROGRESS NOTES
Home Care Face to Face Encounter    Patients Name: Bonnie Hunter    YOB: 1940    Primary Diagnosis: septic arthritis     Date of Face to Face:   12/01/17                                  Face to Face Encounter findings are related to primary reason for home care:   yes. 1. I certify that the patient needs intermittent care as follows: skilled nursing care:  administration of medications and PICC care and antibiotic administration  physical therapy: strengthening, stretching/ROM, transfer training and gait/stair training  occupational therapy:  ROM    2. I certify that this patient is homebound, that is: 1) patient requires the use of a walker device, special transportation, or assistance of another to leave the home; or 2) patient's condition makes leaving the home medically contraindicated; and 3) patient has a normal inability to leave the home and leaving the home requires considerable and taxing effort. Patient may leave the home for infrequent and short duration for medical reasons, and occasional absences for non-medical reasons. Homebound status is due to the following functional limitations: Patient's ambulation limited secondary to severe pain and requires the use of an assistive device and the assistance of a caregiver for safe completion. Patient with strength and ROM deficits limiting ambulation endurance requiring the use of an assistive device and the assistance of a caregiver. Patient deemed temporarily homebound secondary to increased risk for infection when leaving home and going out into the community. 3. I certify that this patient is under my care and that I, or a nurse practitioner or  [id number], or clinical nurse specialist, or certified nurse midwife, working with me, had a Face-to-Face Encounter that meets the physician Face-to-Face Encounter requirements.   The following are the clinical findings from the [address] [place] [address] encounter that support the need for skilled services and is a summary of the encounter:      See discharge summary      Alexia Jay MD  12/1/2017

## 2017-12-01 NOTE — PROGRESS NOTES
Hannah Duval is checking with Dr Lisa Snow PCP to insure they are willing to sign pt.'s home health orders. Arnie Power's therapists are not able to begin start of care until Monday so will need to clarify with physician if this is okay for PT/OT to commence on Monday. If so,will need an order stating this. If not okay,I will need to set up home health with a different agency. I will discuss pt with attending in am rounds so I will know whether to bring pt a portable tank of oxygen.   Diana Henry  Team B  [phone]

## 2017-12-01 NOTE — PROGRESS NOTES
[facility] Infectious Diseases Outpatient  IV Antibiotic Orders    1. Diagnosis:  Septic shoulder culture negative  2. Routine PICC/ [place]/ Portacath Care including PRN cath-flow/activase to declot   3. Antibiotic: ceftriaxone 2gm IV daily through 12/27/17                          Vancomycin 1250gm IV q 8 hours through 12/27/17  4. Last labs  Lab Results   Component Value Date/Time    WBC 13.9 12/01/2017 03:07 AM    HGB 8.2 12/01/2017 03:07 AM    HCT 25.6 12/01/2017 03:07 AM    PLATELET 248 61/75/7370 03:07 AM    MCV 88.3 12/01/2017 03:07 AM     Lab Results   Component Value Date/Time    Sodium 141 11/30/2017 12:19 AM    Potassium 3.8 11/30/2017 12:19 AM    Chloride 107 11/30/2017 12:19 AM    CO2 29 11/30/2017 12:19 AM    Anion gap 5 11/30/2017 12:19 AM    Glucose 148 11/30/2017 12:19 AM    BUN 9 11/30/2017 12:19 AM    Creatinine 0.58 12/01/2017 03:07 AM    BUN/Creatinine ratio 14 11/30/2017 12:19 AM    GFR est AA >60 12/01/2017 03:07 AM    GFR est non-AA >60 12/01/2017 03:07 AM    Calcium 7.9 11/30/2017 12:19 AM    Bilirubin, total 0.5 11/27/2017 08:50 AM    AST (SGOT) 26 11/27/2017 08:50 AM    Alk. phosphatase 103 11/27/2017 08:50 AM    Protein, total 7.4 11/27/2017 08:50 AM    Albumin 3.0 11/27/2017 08:50 AM    Globulin 4.4 11/27/2017 08:50 AM    A-G Ratio 0.7 11/27/2017 08:50 AM    ALT (SGPT) 39 11/27/2017 08:50 AM       Last Vanc trough:     5. _x__ Weekly Labs:      ____ Bi Weekly Labs:  __________     Start date:  _____     _x__CBC/diff/platelets   _x__AST/ALT/Alk phos/   ___CPK   _x__BUN/CRT   ___ESR   _x__CRP   ___Gentamicin level  ___gentamicin peak/trough   _x__Trough Vancomycin level    _x__Goal 15-20 ___Goal 10-15    6. Fax Final lab results and critical values to [facility] @[phone]  7. Call urgent lab results to [phone]. Allergies:  No Known Allergies  9. Pharmacy: Consult for home IV an Rajinder 46 antibiotics         10.  Pharmacy Consult for Vancomycin/Aminoglycoside Dosing  11. First Dose protocol as needed.    12. Please pull PIC line at end of therapy or send to IR for [facility] removal     Home Health: Call Angio at [facility] to schedule [facility] Removal :  P:  [phone]    Fax: [phone] or [facility],

## 2017-12-01 NOTE — PROGRESS NOTES
Occupational therapy note:  Chart reviewed. Attempted to see patient for OT treatment. Patient currently off the floor for PICC line placement per spouse who was in patient room. Will follow up with patient as able at late time. Ros Sehr MS OTR/L

## 2017-12-01 NOTE — ROUTINE PROCESS
Bedside and Verbal shift change report given to Melyssa Valle (oncoming nurse) by Kassie LOVE (offgoing nurse). Report included the following information SBAR, Kardex, Intake/Output and Recent Results Cardiac Rhythm NSR.

## 2017-12-01 NOTE — PROGRESS NOTES
I discussed pt with Leona Joyner from [facility] [employer] who also met with pt,her ,and daughter. [facility] explained to pt about the billing process and pt and  are in agreement. [facility] instructed pt and his wife on iv antibiotic administration. Leona Joyner will call Dawna Stephens with [facility] who is seeing pt for [facility],PT and OT to coordinate services between both agencies. [facility] will meet pt at their home after 6 pm to set pt up wth her home iv antibiotics. Pt has received all necessary doses here @ the hospital today. Dr Eliseo Webber is working on the discharge piece. Once completed,I will fax AVS and discharge summary to [facility]. [facility] can electronically pull it up. HCP and [facility] placed on pt.'s AVS.From a case management perspective,pt is set up for discharge. Thank you.   Maria Kelly  Team B  [phone]

## 2017-12-01 NOTE — PROGRESS NOTES
Picc report faxed to [facility] @ [phone]. Pt .'s insurance will be billed for iv antibiotics. If hospital bills pt first,pt .'s antibiotics will be 100% covered. If not ,pt will pay 795 for both antibiotics. Pt informed.   [facility]  Team B  [phone]

## 2017-12-01 NOTE — DISCHARGE SUMMARY
Hospitalist Discharge Summary     Patient ID:    Rosenda Bueno  [id number]  27 y.o.  1940    Admit date: 11/27/2017    Discharge date and time: 12/1/2017    Admission Diagnoses: Sepsis ([facility]  SEPTIC SHOULDER    Chronic Diagnoses:    Problem List as of 12/1/2017  Date Reviewed: 8/27/2016          Codes Class Noted - Resolved    Septic arthritis [facility]) ICD-10-CM: M00.9  ICD-9-CM: 711.00  12/1/2017 - Present        Obesity, morbid ([facility] ICD-10-CM: E66.01  ICD-9-CM: 278.01  11/27/2017 - Present        Hyperglycemia due to type 2 diabetes mellitus ([place] [address].) ICD-10-CM: E11.65  ICD-9-CM: 250.00  8/4/2016 - Present        Depression ICD-10-CM: F32.9  ICD-9-CM: 098  12/21/2013 - Present        Hyperlipidemia ICD-10-CM: E78.5  ICD-9-CM: 272.4  12/20/2013 - Present        * (Principal)RESOLVED: Sepsis ([facility] ICD-10-CM: A41.9  ICD-9-CM: 038.9, 995.91  11/27/2017 - 12/1/2017        RESOLVED: Lactic acidosis ICD-10-CM: E87.2  ICD-9-CM: 276.2  11/27/2017 - 12/1/2017        RESOLVED: Klebsiella pneumonia ([facility] ICD-10-CM: J15.0  ICD-9-CM: 482.0  8/10/2016 - 8/25/2016        RESOLVED: GI bleed ICD-10-CM: K92.2  ICD-9-CM: 578.9  8/4/2016 - 11/27/2017        RESOLVED: Acute blood loss anemia ICD-10-CM: D62  ICD-9-CM: 285.1  8/4/2016 - 11/27/2017        RESOLVED: Leukocytosis ICD-10-CM: A37.161  ICD-9-CM: 288.60  12/21/2013 - 8/25/2016        RESOLVED: Fever, unspecified ICD-10-CM: R50.9  ICD-9-CM: 780.60  12/21/2013 - 8/4/2016        RESOLVED: Sinus tachycardia ICD-10-CM: R00.0  ICD-9-CM: 427.89  12/21/2013 - 8/4/2016        RESOLVED: Other dyspnea and respiratory abnormality ICD-10-CM: R06.09, R09.89  ICD-9-CM: 786.09  12/21/2013 - 8/4/2016        RESOLVED: Cough ICD-10-CM: R05  ICD-9-CM: 786.2  12/21/2013 - 11/27/2017        RESOLVED: Pneumonia, organism unspecified(486) ICD-10-CM: J18.9  ICD-9-CM: 486  12/21/2013 - 8/4/2016        RESOLVED: Sepsis(995.91) ICD-10-CM: A41.9  ICD-9-CM: 995.91  12/20/2013 - 8/4/2016        RESOLVED: TIA (transient ischemic attack) ICD-10-CM: G45.9  ICD-9-CM: 435.9  12/20/2013 - 8/25/2016              Discharge Medications:   Current Discharge Medication List      START taking these medications    Details   cefTRIAXone 2 gram 2 g, ADDaptor 1 Device IVPB 2 g by IntraVENous route every twenty-four (24) hours for 25 days. Qty: 30 Dose, Refills: 0      vancomycin 10 gram 1,250 mg IVPB 1,250 mg by IntraVENous route every eight (8) hours for 26 days. Qty: 100 Dose, Refills: 0         CONTINUE these medications which have NOT CHANGED    Details   omega-3 acid ethyl esters (LOVAZA) 1 gram capsule Take 4 g by mouth daily (with breakfast). ferrous sulfate 325 mg (65 mg iron) tablet Take 1 Tab by mouth daily (with breakfast). Qty: 30 Tab, Refills: 1      aspirin delayed-release 81 mg tablet Take 81 mg by mouth daily. atorvastatin (LIPITOR) 40 mg tablet Take 40 mg by mouth daily. escitalopram oxalate (LEXAPRO) 20 mg tablet Take 20 mg by mouth daily. metFORMIN (GLUCOPHAGE) 500 mg tablet Take 1,000 mg by mouth daily (with breakfast). losartan (COZAAR) 25 mg tablet Take 25 mg by mouth daily. Follow up Care:    1. Zoie Quick MD in 1-2 weeks  2. orthopedics    Diet:  Diabetic Diet    Disposition:  Home. Advanced Directive:    Discharge Exam:  See today's note. CONSULTATIONS: ID and Orthopedic Surgery    Significant Diagnostic Studies:   Recent Labs      12/01/17 0307 11/30/17   0019   WBC  13.9*  14.9*   HGB  8.2*  8.1*   HCT  25.6*  26.7*   PLT  347  296     Recent Labs      12/01/17   0307 11/30/17   0019  11/29/17   0100   NA   --   141   --    K   --   3.8   --    CL   --   107   --    CO2   --   29   --    BUN   --   9   --    CREA  0.58  0.65  0.72   GLU   --   148*   --    CA   --   7.9*   --      No results for input(s): SGOT, GPT, ALT, AP, TBIL, TBILI, TP, ALB, GLOB, GGT, AML, LPSE in the last 72 hours.     No lab exists for component: AMYP, HLPSE  No results for input(s): INR, PTP, APTT in the last 72 hours. No lab exists for component: INREXT   Recent Labs      11/29/17   0100   TIBC  352   PSAT  3*   FERR  67      No results for input(s): PH, PCO2, PO2 in the last 72 hours. No results for input(s): CPK, CKMB in the last 72 hours. No lab exists for component: TROPONINI  Lab Results   Component Value Date/Time    Glucose (POC) 140 12/01/2017 12:52 PM    Glucose (POC) 147 12/01/2017 07:24 AM    Glucose (POC) 194 11/30/2017 08:33 PM    Glucose (POC) 158 11/30/2017 04:21 PM    Glucose (POC) 179 11/30/2017 12:15 PM             HOSPITAL COURSE & TREATMENT RENDERED:   1. Septic arthritis. S/p I&D on 11/27. On ABx. Culture is negative so far. Leukocytosis has improved. Orthopedics evaluation appreciated. ID evaluation appreciated and pt will need home vanc and ceftriaxone until 12/27/17. Follow up with ortho      2. Leukocytosis - likely secondary to above. Improving. Continue ABx. Evaluated by hematology       3. Hyperlipidemia (12/20/2013). On statin       4. Depression (12/21/2013)-continue Lexapro      5. Hyperglycemia due to type 2 diabetes mellitus ([facility] (8/4/2016). A1c is 7.9. Cover with SSI. Metformin is on hold.       6. Obesity, morbid ([facility] (11/27/2017)-counseled on weight loss on admission     7.  Lactic acidosis (11/27/2017) - likely secondary to #1, resolved.      8. Tongue lesion, painful. Easily pealed part of it. Pt was evaluated by ENT and pt will follow with them for re evaluation and if the lesion continue show change plan for Bx as outpatient.       9. Hx of COPD/ chronic respiratory failure. Pt is on 2LPM O2 at home. walking with PT, pt requires 3LPM O2 which will be arranged by CM.      10. Constipation. Bowel regimen.      Discharged in improved condition       Signed:  Brice Yip MD  12/1/2017  2:06 PM

## 2017-12-01 NOTE — PROGRESS NOTES
[facility]  [address] [facility], [address] [place] [address]  [phone]      Medical Progress Note      NAME: Jey Stauffer   :  1940  MRM:  [id number]    Date/Time: 2017  7:24 AM       Assessment and Plan:   1. Septic arthritis. S/p I&D on . On ABx. Culture is negative so far. Leukocytosis has improved. Orthopedics evaluation appreciated. ID evaluation appreciated and pt will need home vanc through 17. 2.  Leukocytosis - likely secondary to above. Improving. Continue ABx. Evaluated by hematology      3. Hyperlipidemia (2013). On statin      4. Depression (2013)-continue Lexapro     5. Hyperglycemia due to type 2 diabetes mellitus ([facility] (2016). A1c is 7.9. Cover with SSI. Metformin is on hold. 6.  Obesity, morbid ([facility] (2017)-counseled on weight loss on admission    7. Lactic acidosis (2017) - likely secondary to #1, resolved. 8.  Tongue lesion, painful. Easily pealed part of it. Pt was evaluated by ENT and pt will follow with them for re evaluation and if the lesion continue show change plan for Bx as outpatient. 9.  Hx of COPD/ chronic respiratory failure. Pt is on 2LPM O2 at home. walking with PT, pt requires 3LPM O2 which will be arranged by CM.     10.  Constipation. Bowel regimen. Subjective:     Chief Complaint:  Follow up pt who was admitted with SIRS/ septic arthritis. nausea and constipation     ROS:  (bold if positive, if negative)      Tolerating PT  Tolerating Diet        Objective:     Last 24hrs VS reviewed since prior progress note.  Most recent are:    Visit Vitals    /71 (BP 1 Location: Right arm, BP Patient Position: At rest)    Pulse 76    Temp 98 °F (36.7 °C)    Resp 16    Ht 4' 8\" (1.422 m)    Wt 92.7 kg (204 lb 6.4 oz)    SpO2 97%    Breastfeeding No    BMI 45.83 kg/m2     SpO2 Readings from Last 6 Encounters:   17 97%   17 95%   16 94%   08/10/16 94%   06/24/16 96%   06/10/16 96%    O2 Flow Rate (L/min): 2 l/min     No intake or output data in the 24 hours ending 12/01/17 1027     Physical Exam:    Gen:  obese, in no acute distress  HEENT:  Pink conjunctivae, PERRL, hearing intact to voice, moist mucous membranes  Neck:  Supple, without masses, thyroid non-tender  Resp:  No accessory muscle use, clear breath sounds without wheezes rales or rhonchi  Card:  No murmurs, normal S1, S2 without thrills, bruits or peripheral edema  Abd:  Soft, non-tender, non-distended, normoactive bowel sounds are present, no palpable organomegaly and no detectable hernias  Lymph:  No cervical or inguinal adenopathy  Musc:  No cyanosis or clubbing  Skin:  No rashes or ulcers, skin turgor is good  Neuro:  Cranial nerves are grossly intact, no focal motor weakness, follows commands appropriately  Psych:  Good insight, oriented to person, place and time, alert  __________________________________________________________________  Medications Reviewed: (see below)  Medications:     Current Facility-Administered Medications   Medication Dose Route Frequency    cefTRIAXone (ROCEPHIN) 2 g in 0.9% sodium chloride (MBP/ADV) 50 mL  2 g IntraVENous Q24H    polyethylene glycol (MIRALAX) packet 17 g  17 g Oral BID    vancomycin (VANCOCIN) 1,250 mg in 0.9% sodium chloride 250 mL IVPB  1,250 mg IntraVENous Q8H    nystatin (MYCOSTATIN) 100,000 unit/mL oral suspension 500,000 Units  500,000 Units Oral QID    senna-docusate (PERICOLACE) 8.6-50 mg per tablet 1 Tab  1 Tab Oral DAILY    polyethylene glycol (MIRALAX) packet 17 g  17 g Oral DAILY PRN    oxyCODONE-acetaminophen (PERCOCET) 5-325 mg per tablet 2 Tab  2 Tab Oral Q6H PRN    sodium chloride (NS) flush 5-10 mL  5-10 mL IntraVENous PRN    sodium chloride (NS) flush 5-10 mL  5-10 mL IntraVENous Q8H    sodium chloride (NS) flush 5-10 mL  5-10 mL IntraVENous PRN    acetaminophen (TYLENOL) tablet 650 mg  650 mg Oral Q4H PRN    naloxone [facility]) injection 0.4 mg  0.4 mg IntraVENous PRN    ondansetron (ZOFRAN) injection 4 mg  4 mg IntraVENous Q4H PRN    enoxaparin (LOVENOX) injection 40 mg  40 mg SubCUTAneous Q12H    insulin lispro (HUMALOG) injection   SubCUTAneous AC&HS    glucose chewable tablet 16 g  4 Tab Oral PRN    dextrose (D50W) injection syrg 12.5-25 g  12.5-25 g IntraVENous PRN    glucagon (GLUCAGEN) injection 1 mg  1 mg IntraMUSCular PRN    aspirin delayed-release tablet 81 mg  81 mg Oral DAILY    atorvastatin (LIPITOR) tablet 40 mg  40 mg Oral DAILY    escitalopram oxalate (LEXAPRO) tablet 20 mg  20 mg Oral DAILY    ferrous sulfate tablet 325 mg  325 mg Oral DAILY WITH BREAKFAST    omega-3 acid ethyl esters (LOVAZA) capsule 4,000 mg  4 g Oral DAILY WITH BREAKFAST    guaiFENesin ER (MUCINEX) tablet 600 mg  600 mg Oral Q12H        Lab Data Reviewed: (see below)  Lab Review:     Recent Labs      12/01/17 0307 11/30/17 0019 11/29/17   0100   WBC  13.9*  14.9*  16.5*   HGB  8.2*  8.1*  8.9*   HCT  25.6*  26.7*  28.1*   PLT  347  296  302     Recent Labs      12/01/17 0307 11/30/17 0019  11/29/17   0100   NA   --   141   --    K   --   3.8   --    CL   --   107   --    CO2   --   29   --    GLU   --   148*   --    BUN   --   9   --    CREA  0.58  0.65  0.72   CA   --   7.9*   --      Lab Results   Component Value Date/Time    Glucose (POC) 147 12/01/2017 07:24 AM    Glucose (POC) 194 11/30/2017 08:33 PM    Glucose (POC) 158 11/30/2017 04:21 PM    Glucose (POC) 179 11/30/2017 12:15 PM    Glucose (POC) 172 11/30/2017 07:31 AM     No results for input(s): PH, PCO2, PO2, HCO3, FIO2 in the last 72 hours. No results for input(s): INR in the last 72 hours.     No lab exists for component: [place] Bodily  All Micro Results     Procedure Component Value Units Date/Time    CULTURE, BLOOD [[id number]] Collected:  11/27/17 1143    Order Status:  Completed Specimen:  Blood from Blood Updated:  12/01/17 0718     Special Requests: NO SPECIAL REQUESTS        Culture result: NO GROWTH 4 DAYS       CULTURE, BODY FLUID Lesleigh Philip STAIN [[id number]] Collected:  11/27/17 1919    Order Status:  Completed Specimen:  Joint Fluid Updated:  11/29/17 0917     Special Requests: NO SPECIAL REQUESTS        GRAM STAIN RARE WBCS SEEN         NO ORGANISMS SEEN        Culture result:         Culture performed on Fluid swab specimen              No growth thus far, holding 14 days. CULTURE, RESPIRATORY/SPUTUM/BRONCH Mal Gip [[id number]] Collected:  11/27/17 1500    Order Status:  Canceled Specimen:  Sputum from Sputum           I have reviewed notes of prior 24hr. Other pertinent lab:       Total time spent with patient: Jannetteu 60 discussed with: Patient, Nursing Staff and >50% of time spent in counseling and coordination of care    Discussed:  Care Plan    Prophylaxis:  Lovenox    Disposition:  Home w/Family           ___________________________________________________    Attending Physician: Miriam Cameron MD

## 2017-12-01 NOTE — PROGRESS NOTES
Bedside shift change report given to [employer] RN (oncoming nurse) by Aparna Rinaldi RN (offgoing nurse). Report included the following information SBAR, Kardex, Procedure Summary, Intake/Output, MAR and Recent Results.

## 2017-12-01 NOTE — DISCHARGE INSTRUCTIONS
ACUTE DIAGNOSES:  Sepsis ([facility]  SEPTIC SHOULDER    CHRONIC MEDICAL DIAGNOSES:  Problem List as of 12/1/2017  Date Reviewed: 8/27/2016          Codes Class Noted - Resolved    Septic arthritis [facility]) ICD-10-CM: M00.9  ICD-9-CM: 711.00  12/1/2017 - Present        Obesity, morbid ([place] [address].) ICD-10-CM: E66.01  ICD-9-CM: 278.01  11/27/2017 - Present        Hyperglycemia due to type 2 diabetes mellitus ([place] [address] ICD-10-CM: E11.65  ICD-9-CM: 250.00  8/4/2016 - Present        Depression ICD-10-CM: F32.9  ICD-9-CM: 356  12/21/2013 - Present        Hyperlipidemia ICD-10-CM: E78.5  ICD-9-CM: 272.4  12/20/2013 - Present        * (Principal)RESOLVED: Sepsis ([facility] ICD-10-CM: A41.9  ICD-9-CM: 038.9, 995.91  11/27/2017 - 12/1/2017        RESOLVED: Lactic acidosis ICD-10-CM: E87.2  ICD-9-CM: 276.2  11/27/2017 - 12/1/2017        RESOLVED: Klebsiella pneumonia ([place] [address].) ICD-10-CM: J15.0  ICD-9-CM: 482.0  8/10/2016 - 8/25/2016        RESOLVED: GI bleed ICD-10-CM: K92.2  ICD-9-CM: 578.9  8/4/2016 - 11/27/2017        RESOLVED: Acute blood loss anemia ICD-10-CM: D62  ICD-9-CM: 285.1  8/4/2016 - 11/27/2017        RESOLVED: Leukocytosis ICD-10-CM: Z94.389  ICD-9-CM: 288.60  12/21/2013 - 8/25/2016        RESOLVED: Fever, unspecified ICD-10-CM: R50.9  ICD-9-CM: 780.60  12/21/2013 - 8/4/2016        RESOLVED: Sinus tachycardia ICD-10-CM: R00.0  ICD-9-CM: 427.89  12/21/2013 - 8/4/2016        RESOLVED: Other dyspnea and respiratory abnormality ICD-10-CM: R06.09, R09.89  ICD-9-CM: 786.09  12/21/2013 - 8/4/2016        RESOLVED: Cough ICD-10-CM: R05  ICD-9-CM: 786.2  12/21/2013 - 11/27/2017        RESOLVED: Pneumonia, organism unspecified(486) ICD-10-CM: J18.9  ICD-9-CM: 486  12/21/2013 - 8/4/2016        RESOLVED: Sepsis(995.91) ICD-10-CM: A41.9  ICD-9-CM: 995.91  12/20/2013 - 8/4/2016        RESOLVED: TIA (transient ischemic attack) ICD-10-CM: G45.9  ICD-9-CM: 435.9  12/20/2013 - 8/25/2016              DISCHARGE MEDICATIONS:          · It is important that you take the medication exactly as they are prescribed. · Keep your medication in the bottles provided by the pharmacist and keep a list of the medication names, dosages, and times to be taken in your wallet. · Do not take other medications without consulting your doctor. DIET:  Diabetic Diet    ACTIVITY: Activity as tolerated    ADDITIONAL INFORMATION: If you experience any of the following symptoms then please call your primary care physician or return to the emergency room if you cannot get hold of your doctor: Fever, chills, nausea, vomiting, diarrhea, change in mentation, falling, bleeding, shortness of breath. FOLLOW UP CARE:  Dr. Bg Astudillo MD  you are to call and set up an appointment to see them in 2 weeks. Follow-up with Dr Obie Dumont, orthopedics in 3 weeks      Information obtained by :  I understand that if any problems occur once I am at home I am to contact my physician. I understand and acknowledge receipt of the instructions indicated above. Physician's or R.N.'s Signature                                                                  Date/Time                                                                                                                                              Patient or Representative Signature                                                          Date/Time    ORTHO:    Left shoulder Physical Therapy with pendulum motion. Antibiotics as prescribed. Follow up with Dr. Chiqui Bhatt in 10 days. Call [phone] for appointment.

## 2017-12-01 NOTE — PROGRESS NOTES
Problem: Mobility Impaired (Adult and Pediatric)  Goal: *Acute Goals and Plan of Care (Insert Text)  Physical Therapy Goals  Initiated 11/29/2017  1. Patient will move from supine to sit and sit to supine  in bed with  independence within 7 day(s). 2.  Patient will transfer from bed to chair and chair to bed with independence using the least restrictive device within 7 day(s). 3.  Patient will perform sit to stand with independence within 7 day(s). 4.  Patient will ambulate with modified independence for 150 feet with the least restrictive device within 7 day(s). physical Therapy TREATMENT  Patient: Cass Villanueva (46 y.o. female)  Date: 12/1/2017  Diagnosis: Sepsis ([address]  SEPTIC SHOULDER Sepsis ([address]  Procedure(s) (LRB):  SHOULDER ARTHROSCOPIC WASHOUT (Left) 4 Days Post-Op  Precautions: Fall, Other (comment) (left shoulder limb alert s/p arthroscopic draining))    ASSESSMENT:  Patient received sitting EOB with  present at bedside, agreeable to PT. On 2L O2 with vitals stable ambulating to bathroom. Patient completed transfers independently, ambulated 80ft with CGA due to fear regarding inability to complete upright activity despite O2 sats remaining stable on 2L. Patient required consistent verbal cuing to maintain pursed lip breathing throughout upright activity. Patient returned to bedside chair on 2L O2, chair alarm activated. PT will continue to follow patient to address reinforcement of pursed lip breathing with upright activity and to improve upright activity tolerance. Progression toward goals:  [x]    Improving appropriately and progressing toward goals  []    Improving slowly and progressing toward goals  []    Not making progress toward goals and plan of care will be adjusted     PLAN:  Patient continues to benefit from skilled intervention to address the above impairments. Continue treatment per established plan of care.   Discharge Recommendations:  Home Health  Further Equipment Recommendations for Discharge:  None     SUBJECTIVE:   Patient stated I need to get back and sit down.     OBJECTIVE DATA SUMMARY:   Critical Behavior:  Neurologic State: Alert  Orientation Level: Oriented X4  Cognition: Appropriate decision making, Appropriate for age attention/concentration, Appropriate safety awareness, Follows commands  Safety/Judgement: Awareness of environment, Fall prevention, Home safety, Insight into deficits  Functional Mobility Training:  Bed Mobility:                    Transfers:  Sit to Stand: Independent  Stand to Sit: Independent                             Balance:  Sitting: Intact  Standing: Intact  Ambulation/Gait Training:  Distance (ft): 80 Feet (ft)  Assistive Device: Gait belt  Ambulation - Level of Assistance: Contact guard assistance;Assist x1                       Speed/Chelsea: Slow;Fluctuations  Step Length: Right shortened;Left shortened                    Stairs:            Neuro Re-Education:    Therapeutic Exercises:     Pain:  Pain Scale 1: Numeric (0 - 10)  Pain Intensity 1: 0              Activity Tolerance:   Good - no complications with vitals during upright activity on 2L O2  Please refer to the flowsheet for vital signs taken during this treatment. After treatment:   [x]    Patient left in no apparent distress sitting up in chair  []    Patient left in no apparent distress in bed  [x]    Call bell left within reach  [x]    Nursing notified  [x]    Caregiver present  [x]    Chair alarm activated    COMMUNICATION/COLLABORATION:   The patients plan of care was discussed with: Registered Nurse    Yovanny Mcnair   Time Calculation: 16 mins    Regarding student involvement in patient care:  A student participated in this treatment session. Per CMS Medicare statements and APTA guidelines I certify that the following was true:  1. I was present and directly observed the entire session. 2. I made all skilled judgments and clinical decisions regarding care.   3. I am the practitioner responsible for assessment, treatment, and documentation.

## 2017-12-01 NOTE — PROGRESS NOTES
PICC Placement Note    PRE-PROCEDURE VERIFICATION  Correct Procedure: yes  Correct Site:  yes  Temperature: Temp: 98 °F (36.7 °C), Temperature Source: Temp Source: Oral  Recent Labs      12/01/17   0307  11/30/17   0019   BUN   --   9   CREA  0.58  0.65   PLT  347  296   WBC  13.9*  14.9*     Allergies: Review of patient's allergies indicates no known allergies. Education materials for PICC Care given: yes. See Patient Education activity for further details. PICC Booklet placed at bedside: yes    Closed Ended PICC Catheters:  Flush Lumens as Follows:  Intermittent Medication:   Flush before and after each medication with 10 ml NS. Unused Ports:  Flush every 8 hours with 10 ml NS.  TPN Ports:  Flush every 24 hours with 20 ml NS prior to hanging new bag. Blood Draws: Stop infusion, draw off and waste 10 ml of blood. Draw sample with 10cc syringe or greater. DO NOT USE VACUTAINER . Transfer with appropriate device to lab  tubes. Flush with 20 ml NS. Dressing Change:  Every 7 days, and PRN using sterile technique if integrity of dressing is compromised. Initial dressing change for central line 24-48 hours post insertion if gauze is used. Apply new dressing per policy. PROCEDURE DETAIL  Consent was obtained and all questions were answered related to risks and benefits. A double lumen PICC line was inserted, as a sterile procedure using ultrasound and modified Seldinger technique for antibiotic therapy and Home IV Therapy. The following documentation is in addition to the PICC properties in the lines/airways flowsheet :  Lot #: [id number]  Lidocaine 1% administered intradermally :yes  Internal Catheter Total Length: 42 (cm)  Vein Selection for PICC:right basilic  Central Line Bundle followed yes  Complication Related to Insertion:no    The placement was verified by EKG, MAX P WAVE @ 43 (cm) 1 CM OUT. PER EKG PICC TIP @ C/A junction.       Line is okay to use: yes    Chiki Marie RN

## 2017-12-01 NOTE — CONSULTS
[facility]   [address] [place] [address]       Name:  Tg Garcia   MR#:  [id number]   :  1940   Account #:  [de-identified]    Date of Consultation:  2017   Date of Adm:  2017       REQUESTING PHYSICIAN: Dr. Sacha Jiménez: Left shoulder pain. HISTORY OF PRESENT ILLNESS: The patient is a 50-year-old   female with past medical history significant for diabetes mellitus,   dyslipidemia, and depression, who was admitted to [facility] on 2017 with the aforementioned complaint. The patient   states that she developed severe left arm pain approximately 4 days   prior to admission. She denies any trauma to the arm. She also denies   any fever, chills or recent infections. The shoulder pain increased over   the last several days. She was seen in the emergency department. She was found to have a white count of 26,000. Aspiration of the left   shoulder revealed purulent material and she was taken to the OR on   2017 for an incision and drainage. She was started on   levofloxacin at the time of admission and vancomycin has been started   postoperatively. Operative cultures reveal no growth to date. The   patient states that her arm feels much better. White count is resolving. Discharge planning is underway and the infectious diseases service   has been asked to assist with antibiotic management. PAST MEDICAL HISTORY: Dyslipidemia, diabetes mellitus,   depression, pneumonia, TIA. PAST SURGICAL HISTORY: Colonoscopy, appendectomy. SOCIAL HISTORY: No alcohol, tobacco or illicit drug use. FAMILY HISTORY: Hypertension. ALLERGIES: NO KNOWN DRUG ALLERGIES. OUTPATIENT MEDICATIONS: Please see body of chart for details. She was not on antibiotics prior to admission. REVIEW OF SYSTEMS: As per the HPI. Remainder of 12 system   review of systems is unremarkable.     LABORATORY DATA: Blood cultures and cultures from the shoulder   reveal no growth to date. White blood cell count from time of admission   was 26,000. It is 13,000 today. Platelet count 239,368. Creatinine is   0.5. Hemoglobin A1c is 7.9. PHYSICAL EXAMINATION   VITAL SIGNS: Temperature 98 degrees Fahrenheit, maximal   temperature as less than 100, heart rate 76 beats per minute, blood   pressure 105/71, respiratory rate 16, oxygen saturation 97% on 2 liters   nasal cannula. GENERAL: Alert, no acute distress. HEENT: Normocephalic, atraumatic. Pupils equal and reactive to light. No oropharyngeal lesions noted. CARDIOVASCULAR: Heart regular rate and rhythm. No murmurs,   gallops, or rubs. PULMONARY: Clear to auscultation bilaterally. ABDOMEN: Soft, nontender, nondistended. EXTREMITIES: No clubbing, cyanosis or edema. SKIN: No rashes. ASSESSMENT AND PLAN:   1. Septic left shoulder. The patient is status post arthroscopic wash   out. Cultures reveal no growth to date. However, the patient was not   on antibiotics prior to surgery. I suspect that the patient developed a   septic left shoulder due to transient bacteremia. Will obtain   echocardiogram. She will need a 4-week course of intravenous   antibiotics. Plan on vancomycin and ceftriaxone. Intravenous antibiotic   orders are in the chart. 2. Diabetes mellitus. This is poorly controlled. Hemoglobin A1c was   7.9.   3. Tongue lesion. This does not appear to be infected. ENT is   following. Thank you for allowing me to participate in the care of this patient.         DO JAMESON Smith / MAXIMUS   D:  12/01/2017   09:39   T:  12/01/2017   10:37   Job #:  [id number]

## 2017-12-01 NOTE — PROGRESS NOTES
[facility] Dosing Services: Antimicrobial Stewardship Daily Doc    Consult for antibiotic dosing of vancomycin by Dr. Jose Renee  Indication: septic arthritis of left shoulder (s/p washout on 11/27)   Day of Therapy 3    Vancomycin therapy:  Current maintenance dose: 1250 (mg) every 12 hours (frequency). Dose calculated to approximate a therapeutic trough of 15-20 mcg/mL. Plan for level / Adjustment in Therapy:trough 2030  Trough = 9.6 at 2052 tonight. Dose administration notes:   Doses given appropriately as scheduled    Plan: Since trough sub-therapeutic to goal (15-20), will increase frequency to every 8 hours for a predicted trough of 18. Will continue to follow    Other Antimicrobial   (not dosed by pharmacist) none   Cultures 11/27 Blood: NGTD X 3D (prelim)  11/27 joint fluid: NGTD (prelim)   Serum Creatinine Lab Results   Component Value Date/Time    Creatinine 0.65 11/30/2017 12:19 AM         Creatinine Clearance Estimated Creatinine Clearance: 73.7 mL/min (based on Cr of 0.65).      Temp Temp: 98.9 °F (37.2 °C)       WBC Lab Results   Component Value Date/Time    WBC 14.9 11/30/2017 12:19 AM        H/H Lab Results   Component Value Date/Time    HGB 8.1 11/30/2017 12:19 AM        Platelets    Lab Results   Component Value Date/Time    PLATELET 481 72/85/0256 12:19 AM                  Pharmacist Chas Camacho Contact information:

## 2017-12-02 ENCOUNTER — HOME CARE VISIT (OUTPATIENT)
Dept: SCHEDULING | Facility: HOME HEALTH | Age: 77
End: 2017-12-02
Payer: MEDICARE

## 2017-12-02 VITALS — WEIGHT: 204.37 LBS | HEIGHT: 56 IN | BODY MASS INDEX: 45.97 KG/M2

## 2017-12-02 PROCEDURE — 3331090001 HH PPS REVENUE CREDIT

## 2017-12-02 PROCEDURE — G0299 HHS/HOSPICE OF RN EA 15 MIN: HCPCS

## 2017-12-02 PROCEDURE — 400013 HH SOC

## 2017-12-02 PROCEDURE — 3331090002 HH PPS REVENUE DEBIT

## 2017-12-03 LAB
BACTERIA SPEC CULT: NORMAL
SERVICE CMNT-IMP: NORMAL

## 2017-12-03 PROCEDURE — 3331090002 HH PPS REVENUE DEBIT

## 2017-12-03 PROCEDURE — 3331090001 HH PPS REVENUE CREDIT

## 2017-12-04 ENCOUNTER — HOME CARE VISIT (OUTPATIENT)
Dept: SCHEDULING | Facility: HOME HEALTH | Age: 77
End: 2017-12-04
Payer: MEDICARE

## 2017-12-04 VITALS
TEMPERATURE: 98.2 F | HEART RATE: 85 BPM | SYSTOLIC BLOOD PRESSURE: 148 MMHG | DIASTOLIC BLOOD PRESSURE: 78 MMHG | OXYGEN SATURATION: 98 % | RESPIRATION RATE: 20 BRPM

## 2017-12-04 LAB
Lab: NORMAL
REFERENCE LAB,REFLB: NORMAL
TEST DESCRIPTION:,ATST: NORMAL

## 2017-12-04 PROCEDURE — G0151 HHCP-SERV OF PT,EA 15 MIN: HCPCS

## 2017-12-04 PROCEDURE — 3331090001 HH PPS REVENUE CREDIT

## 2017-12-04 PROCEDURE — 3331090002 HH PPS REVENUE DEBIT

## 2017-12-05 ENCOUNTER — HOSPITAL ENCOUNTER (EMERGENCY)
Age: 77
Discharge: HOME | End: 2017-12-05
Attending: EMERGENCY MEDICINE | Admitting: EMERGENCY MEDICINE
Payer: MEDICARE

## 2017-12-05 ENCOUNTER — HOME CARE VISIT (OUTPATIENT)
Dept: SCHEDULING | Facility: HOME HEALTH | Age: 77
End: 2017-12-05
Payer: MEDICARE

## 2017-12-05 VITALS
HEIGHT: 56 IN | BODY MASS INDEX: 42.74 KG/M2 | OXYGEN SATURATION: 97 % | SYSTOLIC BLOOD PRESSURE: 134 MMHG | WEIGHT: 190 LBS | TEMPERATURE: 99 F | HEART RATE: 77 BPM | DIASTOLIC BLOOD PRESSURE: 60 MMHG | RESPIRATION RATE: 20 BRPM

## 2017-12-05 DIAGNOSIS — Z78.9 PROBLEM WITH VASCULAR ACCESS: Primary | ICD-10-CM

## 2017-12-05 PROCEDURE — 74011000250 HC RX REV CODE- 250: Performed by: PHYSICIAN ASSISTANT

## 2017-12-05 PROCEDURE — 36593 DECLOT VASCULAR DEVICE: CPT

## 2017-12-05 PROCEDURE — G0152 HHCP-SERV OF OT,EA 15 MIN: HCPCS

## 2017-12-05 PROCEDURE — 99283 EMERGENCY DEPT VISIT LOW MDM: CPT

## 2017-12-05 PROCEDURE — G0300 HHS/HOSPICE OF LPN EA 15 MIN: HCPCS

## 2017-12-05 PROCEDURE — 3331090001 HH PPS REVENUE CREDIT

## 2017-12-05 PROCEDURE — 3331090002 HH PPS REVENUE DEBIT

## 2017-12-05 PROCEDURE — 74011250636 HC RX REV CODE- 250/636: Performed by: PHYSICIAN ASSISTANT

## 2017-12-05 RX ORDER — HEPARIN 100 UNIT/ML
300 SYRINGE INTRAVENOUS
Status: COMPLETED | OUTPATIENT
Start: 2017-12-05 | End: 2017-12-05

## 2017-12-05 RX ORDER — HEPARIN 100 UNIT/ML
300 SYRINGE INTRAVENOUS
Status: DISCONTINUED | OUTPATIENT
Start: 2017-12-05 | End: 2017-12-05

## 2017-12-05 RX ADMIN — SODIUM CHLORIDE, PRESERVATIVE FREE 300 UNITS: 5 INJECTION INTRAVENOUS at 19:02

## 2017-12-05 RX ADMIN — WATER 1 MG: 1 INJECTION INTRAMUSCULAR; INTRAVENOUS; SUBCUTANEOUS at 18:01

## 2017-12-05 NOTE — ED NOTES
Red catheter now flushes and draws back blood. 5mL of blood removed per Micromedex recommendations and flushed with 20mL NS. Purple catheter flushes now with no resistance and is now also able to draw back blood. Order needed for heparin flushes. Will notify PA of progress.

## 2017-12-05 NOTE — ED PROVIDER NOTES
HPI Comments: Viktoriya Ovalle is a 69 y.o. female  who presents by private vehicle to ER with c/o Patient presents with:  Vascular Access Problem  Shortness of Breath  Patient has PICC line for IV home antibiotics. Patient reports that it is clogged. Unable to use one of the ports yesterday, today home health was unable to draw blood from other port. Patient had last dose of antibiotics at 3pm. Next dose scheduled for 11pm tonight. Patient also reports shortness of breath which is chronic and wears oxygen at home. Patient denies chest pain. She specifically denies any fevers, chills, nausea, vomiting, chest pain,  headache, rash, diarrhea, abdominal pain, urinary/bowel changes, sweating or weight loss. PCP: Lea Weston MD   PMHx significant for: Past Medical History:  No date: Hypercholesterolemia  8/4/2016: Hyperglycemia due to type 2 diabetes mellitus *  No date: Ill-defined condition      Comment: cellulitis  No date: Major depression      Comment: depression  No date: Pneumonia      Comment: 2011 or so  12/01/2017: S/P PICC central line placement      Comment: Right basilic PICC placement for LT antbx  No date: Transient ischemic attack   PSHx significant for: Past Surgical History:  6/24/2016: COLONOSCOPY      Comment:    6/24/2016: COLONOSCOPY N/A      Comment: COLONOSCOPY performed by Ulises Amos MD                at [address] [place] [address]  8/26/2016: FLEXIBLE SIGMOIDOSCOPY N/A      Comment: [address] performed by Akash Meza MD at [address] [place] [address]  No date: HX APPENDECTOMY  8/26/2016: UPPER GI ENDOSCOPY,DIAGNOSIS      Comment:    Social Hx: Tobacco use: Smoking status: Former Smoker                                                              Packs/day: 0.00      Years: 0.00         Smokeless status: Not on file                     ; EtOH use: The patient states she drinks 0 per week.; Illicit Drug use:      Allergies:  No Known Allergies    There are no other complaints, changes or physical findings at this time. Patient is a 69 y.o. female presenting with vascular access problem. The history is provided by the patient. Vascular Access Problem    This is a new problem. The current episode started yesterday. The problem occurs constantly. The problem has not changed since onset. The pain is present in the right arm. The patient is experiencing no pain. Pertinent negatives include no numbness, full range of motion, no stiffness, no tingling, no itching and no back pain. She has tried nothing for the symptoms. There has been no history of extremity trauma. Past Medical History:   Diagnosis Date    Hypercholesterolemia     Hyperglycemia due to type 2 diabetes mellitus ([facility] [address] 8/4/2016    Ill-defined condition     cellulitis    Major depression     depression    Pneumonia     2011 or so    S/P PICC central line placement 81/69/4537    Right basilic PICC placement for LT antbx    Transient ischemic attack        Past Surgical History:   Procedure Laterality Date    COLONOSCOPY  6/24/2016         COLONOSCOPY N/A 6/24/2016    COLONOSCOPY performed by Davie Layne MD at [address] N/A 8/26/2016    SIGMOIDOSCOPY FLEXIBLE performed by Davie Layne MD at [address] HX APPENDECTOMY      UPPER GI ENDOSCOPY,DIAGNOSIS  8/26/2016              No family history on file. Social History     Social History    Marital status:      Spouse name: N/A    Number of children: N/A    Years of education: N/A     Occupational History    Not on file. Social History Main Topics    Smoking status: Former Smoker    Smokeless tobacco: Not on file    Alcohol use No    Drug use: No    Sexual activity: Not on file     Other Topics Concern    Not on file     Social History Narrative         ALLERGIES: Review of patient's allergies indicates no known allergies. Review of Systems   Constitutional: Negative.     HENT: Negative. Eyes: Negative. Respiratory: Positive for shortness of breath. Cardiovascular: Negative. Gastrointestinal: Negative. Endocrine: Negative. Genitourinary: Negative. Musculoskeletal: Negative. Negative for back pain and stiffness. Skin: Negative. Negative for itching. Allergic/Immunologic: Negative. Neurological: Negative. Negative for tingling and numbness. Hematological: Negative. Psychiatric/Behavioral: Negative. All other systems reviewed and are negative. Vitals:    12/05/17 1700 12/05/17 1709 12/05/17 1809 12/05/17 1811   BP: 134/60      Pulse: 84   77   Resp: 20      Temp: 99 °F (37.2 °C)      SpO2: 91% 91% 91% 97%   Weight: 86.2 kg (190 lb)      Height: 4' 8\" (1.422 m)               Physical Exam   Constitutional: She is oriented to person, place, and time. She appears well-developed. HENT:   Head: Normocephalic and atraumatic. Right Ear: External ear normal.   Left Ear: External ear normal.   Nose: Nose normal.   Mouth/Throat: Oropharynx is clear and moist. No oropharyngeal exudate. Eyes: Conjunctivae, EOM and lids are normal. Right eye exhibits no discharge. Left eye exhibits no discharge. Neck: Normal range of motion. No tracheal deviation present. No thyromegaly present. Cardiovascular: Normal rate, regular rhythm, normal heart sounds and intact distal pulses. Pulmonary/Chest: Effort normal and breath sounds normal.   Abdominal: Soft. Normal appearance and bowel sounds are normal.   Musculoskeletal: Normal range of motion. Arms:  Neurological: She is alert and oriented to person, place, and time. Skin: Skin is warm and dry. Psychiatric: She has a normal mood and affect. Judgment normal.        MDM  Number of Diagnoses or Management Options  Problem with vascular access:   Diagnosis management comments: Assesment/Plan- 69 y.o.  Patient presents with:  Vascular Access Problem  Shortness of Breath  differential includes: chronic hypoxia, PICC line not working. TPA flushed in POCC line. After waiting an hour able to flush and draw back blood. Will discharge home. Recommend PCP follow up. Patient educated on reasons to return to the ED.          Amount and/or Complexity of Data Reviewed  Discuss the patient with other providers: yes (Attending- Dr. Jozef Snider who agrees with plan)      ED Course       Procedures

## 2017-12-05 NOTE — ED TRIAGE NOTES
\"My PICC line is clogged\". Pt states unable to use PICC line; c/o chronic SOB and uses home O2 via NC at 3-4 lpm. Pt did not bring home O2 with her. Skin warm and dry, pt talking in full sentences. NAD.

## 2017-12-05 NOTE — ED NOTES
Purple hub able to flush with minimal resistance. No blood return. Red port not able to flush at all. cathflo injected with resistance.

## 2017-12-05 NOTE — DISCHARGE INSTRUCTIONS
We hope that we have addressed all of your medical concerns. The examination and treatment you received in the Emergency Department were for an emergent problem and were not intended as complete care. It is important that you follow up with your healthcare provider(s) for ongoing care. If your symptoms worsen or do not improve as expected, and you are unable to reach your usual health care provider(s), you should return to the Emergency Department. Today's healthcare is undergoing tremendous change, and patient satisfaction surveys are one of the many tools to assess the quality of medical care. You may receive a survey from the [employer] organization regarding your experience in the Emergency Department. I hope that your experience has been completely positive, particularly the medical care that I provided. As such, please participate in the survey; anything less than excellent does not meet my expectations or intentions. [facility] and [facility] participate in nationally recognized quality of care measures. If your blood pressure is greater than 120/80, as reported below, we urge that you seek medical care to address the potential of high blood pressure, commonly known as hypertension. Hypertension can be hereditary or can be caused by certain medical conditions, pain, stress, or \"white coat syndrome. \"       Please make an appointment with your health care provider(s) for follow up of your Emergency Department visit. VITALS:   Patient Vitals for the past 8 hrs:   Temp Pulse Resp BP SpO2   12/05/17 1811 - 77 - - 97 %   12/05/17 1809 - - - - 91 %   12/05/17 1709 - - - - 91 %   12/05/17 1700 99 °F (37.2 °C) 84 20 134/60 91 %          Thank you for allowing us to provide you with medical care today. We realize that you have many choices for your emergency care needs. Please choose us in the future for any continued health care needs. Rico Trejo, [facility]: [phone]            No results found for this or any previous visit (from the past 24 hour(s)). No results found.

## 2017-12-06 VITALS
DIASTOLIC BLOOD PRESSURE: 82 MMHG | RESPIRATION RATE: 18 BRPM | SYSTOLIC BLOOD PRESSURE: 160 MMHG | HEART RATE: 83 BPM | TEMPERATURE: 97.4 F | OXYGEN SATURATION: 96 %

## 2017-12-06 PROCEDURE — 3331090002 HH PPS REVENUE DEBIT

## 2017-12-06 PROCEDURE — 3331090001 HH PPS REVENUE CREDIT

## 2017-12-07 ENCOUNTER — HOME CARE VISIT (OUTPATIENT)
Dept: SCHEDULING | Facility: HOME HEALTH | Age: 77
End: 2017-12-07
Payer: MEDICARE

## 2017-12-07 PROCEDURE — G0300 HHS/HOSPICE OF LPN EA 15 MIN: HCPCS

## 2017-12-07 PROCEDURE — 3331090002 HH PPS REVENUE DEBIT

## 2017-12-07 PROCEDURE — G0157 HHC PT ASSISTANT EA 15: HCPCS

## 2017-12-07 PROCEDURE — 3331090001 HH PPS REVENUE CREDIT

## 2017-12-08 PROCEDURE — 3331090002 HH PPS REVENUE DEBIT

## 2017-12-08 PROCEDURE — 3331090001 HH PPS REVENUE CREDIT

## 2017-12-09 PROCEDURE — 3331090002 HH PPS REVENUE DEBIT

## 2017-12-09 PROCEDURE — 3331090001 HH PPS REVENUE CREDIT

## 2017-12-10 VITALS
SYSTOLIC BLOOD PRESSURE: 110 MMHG | TEMPERATURE: 97.8 F | RESPIRATION RATE: 18 BRPM | HEART RATE: 94 BPM | DIASTOLIC BLOOD PRESSURE: 52 MMHG | OXYGEN SATURATION: 96 %

## 2017-12-10 LAB
BACTERIA SPEC CULT: NORMAL
BACTERIA SPEC CULT: NORMAL
GRAM STN SPEC: NORMAL
GRAM STN SPEC: NORMAL
SERVICE CMNT-IMP: NORMAL

## 2017-12-10 PROCEDURE — 3331090002 HH PPS REVENUE DEBIT

## 2017-12-10 PROCEDURE — 3331090001 HH PPS REVENUE CREDIT

## 2017-12-11 ENCOUNTER — HOME CARE VISIT (OUTPATIENT)
Dept: SCHEDULING | Facility: HOME HEALTH | Age: 77
End: 2017-12-11
Payer: MEDICARE

## 2017-12-11 VITALS
RESPIRATION RATE: 16 BRPM | SYSTOLIC BLOOD PRESSURE: 142 MMHG | TEMPERATURE: 97.5 F | HEART RATE: 83 BPM | DIASTOLIC BLOOD PRESSURE: 74 MMHG | OXYGEN SATURATION: 97 %

## 2017-12-11 PROCEDURE — 3331090001 HH PPS REVENUE CREDIT

## 2017-12-11 PROCEDURE — 3331090002 HH PPS REVENUE DEBIT

## 2017-12-11 PROCEDURE — G0157 HHC PT ASSISTANT EA 15: HCPCS

## 2017-12-11 PROCEDURE — G0300 HHS/HOSPICE OF LPN EA 15 MIN: HCPCS

## 2017-12-12 VITALS
OXYGEN SATURATION: 98 % | DIASTOLIC BLOOD PRESSURE: 80 MMHG | OXYGEN SATURATION: 97 % | TEMPERATURE: 97 F | TEMPERATURE: 97.8 F | SYSTOLIC BLOOD PRESSURE: 134 MMHG | TEMPERATURE: 97.9 F | HEART RATE: 70 BPM | OXYGEN SATURATION: 96 % | DIASTOLIC BLOOD PRESSURE: 72 MMHG | DIASTOLIC BLOOD PRESSURE: 76 MMHG | RESPIRATION RATE: 17 BRPM | HEART RATE: 76 BPM | SYSTOLIC BLOOD PRESSURE: 130 MMHG | RESPIRATION RATE: 16 BRPM | SYSTOLIC BLOOD PRESSURE: 134 MMHG | HEART RATE: 78 BPM

## 2017-12-12 PROCEDURE — 3331090001 HH PPS REVENUE CREDIT

## 2017-12-12 PROCEDURE — 3331090002 HH PPS REVENUE DEBIT

## 2017-12-13 ENCOUNTER — HOME CARE VISIT (OUTPATIENT)
Dept: SCHEDULING | Facility: HOME HEALTH | Age: 77
End: 2017-12-13
Payer: MEDICARE

## 2017-12-13 PROCEDURE — G0157 HHC PT ASSISTANT EA 15: HCPCS

## 2017-12-13 PROCEDURE — 3331090002 HH PPS REVENUE DEBIT

## 2017-12-13 PROCEDURE — 3331090001 HH PPS REVENUE CREDIT

## 2017-12-14 PROCEDURE — 3331090002 HH PPS REVENUE DEBIT

## 2017-12-14 PROCEDURE — 3331090001 HH PPS REVENUE CREDIT

## 2017-12-15 PROCEDURE — 3331090002 HH PPS REVENUE DEBIT

## 2017-12-15 PROCEDURE — 3331090001 HH PPS REVENUE CREDIT

## 2017-12-16 PROCEDURE — 3331090002 HH PPS REVENUE DEBIT

## 2017-12-16 PROCEDURE — 3331090001 HH PPS REVENUE CREDIT

## 2017-12-17 PROCEDURE — 3331090001 HH PPS REVENUE CREDIT

## 2017-12-17 PROCEDURE — 3331090002 HH PPS REVENUE DEBIT

## 2017-12-18 ENCOUNTER — HOME CARE VISIT (OUTPATIENT)
Dept: SCHEDULING | Facility: HOME HEALTH | Age: 77
End: 2017-12-18
Payer: MEDICARE

## 2017-12-18 VITALS
RESPIRATION RATE: 18 BRPM | DIASTOLIC BLOOD PRESSURE: 80 MMHG | OXYGEN SATURATION: 98 % | SYSTOLIC BLOOD PRESSURE: 136 MMHG | OXYGEN SATURATION: 92 % | HEART RATE: 76 BPM | HEART RATE: 85 BPM | TEMPERATURE: 97.9 F | RESPIRATION RATE: 17 BRPM | DIASTOLIC BLOOD PRESSURE: 78 MMHG | SYSTOLIC BLOOD PRESSURE: 140 MMHG | TEMPERATURE: 97.3 F

## 2017-12-18 PROCEDURE — 3331090002 HH PPS REVENUE DEBIT

## 2017-12-18 PROCEDURE — G0300 HHS/HOSPICE OF LPN EA 15 MIN: HCPCS

## 2017-12-18 PROCEDURE — 3331090001 HH PPS REVENUE CREDIT

## 2017-12-18 PROCEDURE — G0157 HHC PT ASSISTANT EA 15: HCPCS

## 2017-12-19 PROCEDURE — 3331090002 HH PPS REVENUE DEBIT

## 2017-12-19 PROCEDURE — 3331090001 HH PPS REVENUE CREDIT

## 2017-12-20 ENCOUNTER — HOME CARE VISIT (OUTPATIENT)
Dept: SCHEDULING | Facility: HOME HEALTH | Age: 77
End: 2017-12-20
Payer: MEDICARE

## 2017-12-20 PROCEDURE — 3331090002 HH PPS REVENUE DEBIT

## 2017-12-20 PROCEDURE — G0157 HHC PT ASSISTANT EA 15: HCPCS

## 2017-12-20 PROCEDURE — 3331090001 HH PPS REVENUE CREDIT

## 2017-12-21 PROCEDURE — 3331090002 HH PPS REVENUE DEBIT

## 2017-12-21 PROCEDURE — 3331090001 HH PPS REVENUE CREDIT

## 2017-12-22 ENCOUNTER — HOSPITAL ENCOUNTER (EMERGENCY)
Age: 77
Discharge: HOME | End: 2017-12-22
Attending: EMERGENCY MEDICINE
Payer: MEDICARE

## 2017-12-22 VITALS
HEART RATE: 77 BPM | TEMPERATURE: 98 F | RESPIRATION RATE: 16 BRPM | DIASTOLIC BLOOD PRESSURE: 74 MMHG | OXYGEN SATURATION: 98 % | SYSTOLIC BLOOD PRESSURE: 132 MMHG

## 2017-12-22 VITALS
HEART RATE: 85 BPM | TEMPERATURE: 98.5 F | BODY MASS INDEX: 40.49 KG/M2 | HEIGHT: 56 IN | RESPIRATION RATE: 20 BRPM | SYSTOLIC BLOOD PRESSURE: 135 MMHG | OXYGEN SATURATION: 96 % | DIASTOLIC BLOOD PRESSURE: 68 MMHG | WEIGHT: 180 LBS

## 2017-12-22 DIAGNOSIS — T78.40XA ALLERGIC REACTION, INITIAL ENCOUNTER: Primary | ICD-10-CM

## 2017-12-22 DIAGNOSIS — L03.113 CELLULITIS OF RIGHT UPPER EXTREMITY: ICD-10-CM

## 2017-12-22 LAB
ALBUMIN SERPL-MCNC: 3 G/DL (ref 3.5–5)
ALBUMIN/GLOB SERPL: 0.7 {RATIO} (ref 1.1–2.2)
ALP SERPL-CCNC: 109 U/L (ref 45–117)
ALT SERPL-CCNC: 52 U/L (ref 12–78)
ANION GAP SERPL CALC-SCNC: 10 MMOL/L (ref 5–15)
AST SERPL-CCNC: 42 U/L (ref 15–37)
BASOPHILS # BLD: 0.1 K/UL (ref 0–0.1)
BASOPHILS NFR BLD: 1 % (ref 0–1)
BILIRUB SERPL-MCNC: 0.3 MG/DL (ref 0.2–1)
BUN SERPL-MCNC: 9 MG/DL (ref 6–20)
BUN/CREAT SERPL: 12 (ref 12–20)
CALCIUM SERPL-MCNC: 8.8 MG/DL (ref 8.5–10.1)
CHLORIDE SERPL-SCNC: 102 MMOL/L (ref 97–108)
CO2 SERPL-SCNC: 30 MMOL/L (ref 21–32)
CREAT SERPL-MCNC: 0.73 MG/DL (ref 0.55–1.02)
EOSINOPHIL # BLD: 1.5 K/UL (ref 0–0.4)
EOSINOPHIL NFR BLD: 15 % (ref 0–7)
ERYTHROCYTE [DISTWIDTH] IN BLOOD BY AUTOMATED COUNT: 15.1 % (ref 11.5–14.5)
GLOBULIN SER CALC-MCNC: 4.2 G/DL (ref 2–4)
GLUCOSE SERPL-MCNC: 170 MG/DL (ref 65–100)
HCT VFR BLD AUTO: 33 % (ref 35–47)
HGB BLD-MCNC: 10 G/DL (ref 11.5–16)
LACTATE SERPL-SCNC: 2 MMOL/L (ref 0.4–2)
LYMPHOCYTES # BLD: 1.2 K/UL (ref 0.8–3.5)
LYMPHOCYTES NFR BLD: 12 % (ref 12–49)
MCH RBC QN AUTO: 26.3 PG (ref 26–34)
MCHC RBC AUTO-ENTMCNC: 30.3 G/DL (ref 30–36.5)
MCV RBC AUTO: 86.8 FL (ref 80–99)
MONOCYTES # BLD: 1 K/UL (ref 0–1)
MONOCYTES NFR BLD: 10 % (ref 5–13)
NEUTS SEG # BLD: 6.2 K/UL (ref 1.8–8)
NEUTS SEG NFR BLD: 62 % (ref 32–75)
PLATELET # BLD AUTO: 308 K/UL (ref 150–400)
POTASSIUM SERPL-SCNC: 4.1 MMOL/L (ref 3.5–5.1)
PROT SERPL-MCNC: 7.2 G/DL (ref 6.4–8.2)
RBC # BLD AUTO: 3.8 M/UL (ref 3.8–5.2)
RBC MORPH BLD: ABNORMAL
SODIUM SERPL-SCNC: 142 MMOL/L (ref 136–145)
WBC # BLD AUTO: 10 K/UL (ref 3.6–11)

## 2017-12-22 PROCEDURE — 3331090001 HH PPS REVENUE CREDIT

## 2017-12-22 PROCEDURE — 87040 BLOOD CULTURE FOR BACTERIA: CPT | Performed by: NURSE PRACTITIONER

## 2017-12-22 PROCEDURE — 74011000258 HC RX REV CODE- 258: Performed by: NURSE PRACTITIONER

## 2017-12-22 PROCEDURE — 74011250637 HC RX REV CODE- 250/637: Performed by: NURSE PRACTITIONER

## 2017-12-22 PROCEDURE — 74011636637 HC RX REV CODE- 636/637: Performed by: NURSE PRACTITIONER

## 2017-12-22 PROCEDURE — 96365 THER/PROPH/DIAG IV INF INIT: CPT

## 2017-12-22 PROCEDURE — 85025 COMPLETE CBC W/AUTO DIFF WBC: CPT | Performed by: NURSE PRACTITIONER

## 2017-12-22 PROCEDURE — 96375 TX/PRO/DX INJ NEW DRUG ADDON: CPT

## 2017-12-22 PROCEDURE — 74011250636 HC RX REV CODE- 250/636: Performed by: NURSE PRACTITIONER

## 2017-12-22 PROCEDURE — 74011000250 HC RX REV CODE- 250: Performed by: NURSE PRACTITIONER

## 2017-12-22 PROCEDURE — 99285 EMERGENCY DEPT VISIT HI MDM: CPT

## 2017-12-22 PROCEDURE — 80053 COMPREHEN METABOLIC PANEL: CPT | Performed by: NURSE PRACTITIONER

## 2017-12-22 PROCEDURE — 36415 COLL VENOUS BLD VENIPUNCTURE: CPT | Performed by: NURSE PRACTITIONER

## 2017-12-22 PROCEDURE — 83605 ASSAY OF LACTIC ACID: CPT | Performed by: NURSE PRACTITIONER

## 2017-12-22 PROCEDURE — 3331090002 HH PPS REVENUE DEBIT

## 2017-12-22 RX ORDER — DIPHENHYDRAMINE HCL 25 MG
25 CAPSULE ORAL
Status: DISCONTINUED | OUTPATIENT
Start: 2017-12-22 | End: 2017-12-22 | Stop reason: HOSPADM

## 2017-12-22 RX ORDER — PREDNISONE 20 MG/1
60 TABLET ORAL
Status: COMPLETED | OUTPATIENT
Start: 2017-12-22 | End: 2017-12-22

## 2017-12-22 RX ORDER — FAMOTIDINE 20 MG/1
20 TABLET, FILM COATED ORAL
Status: COMPLETED | OUTPATIENT
Start: 2017-12-22 | End: 2017-12-22

## 2017-12-22 RX ADMIN — FAMOTIDINE 20 MG: 20 TABLET, FILM COATED ORAL at 12:39

## 2017-12-22 RX ADMIN — PREDNISONE 60 MG: 20 TABLET ORAL at 12:39

## 2017-12-22 RX ADMIN — DIPHENHYDRAMINE HYDROCHLORIDE 25 MG: 25 CAPSULE ORAL at 13:34

## 2017-12-22 RX ADMIN — DAPTOMYCIN 500 MG: 500 INJECTION, POWDER, LYOPHILIZED, FOR SOLUTION INTRAVENOUS at 15:06

## 2017-12-22 RX ADMIN — SODIUM CHLORIDE 1 G: 900 INJECTION, SOLUTION INTRAVENOUS at 14:29

## 2017-12-22 NOTE — PROGRESS NOTES
[facility]: MED RECONCILIATION    Comments/Recommendations:   · Verified allergies as documented: NKA  · Med rec completed with patient and her granddaughter  · She was supposed to be on IV abx through  until she had the problems which brought her in. Medications added:     · None    Medications removed:    · Ferrous sulfate    Medications adjusted:    · None    Information obtained from: Patient, Granddaughter, Rx Query    Allergies: Review of patient's allergies indicates no known allergies. Prior to Admission Medications:   Prior to Admission Medications   Prescriptions Last Dose Informant Patient Reported? Taking? HEPARIN SOD,PORCINE/0.9 % NACL (HEPARIN FLUSH IV)  Family Member Yes No   Sig: 3 mL by IntraVENous route daily. aspirin delayed-release 81 mg tablet 2017 at AM Family Member Yes Yes   Sig: Take 81 mg by mouth daily. atorvastatin (LIPITOR) 40 mg tablet 2017 at AM Family Member Yes Yes   Sig: Take 40 mg by mouth daily. cefTRIAXone 2 gram 2 g, ADDaptor 1 Device IVPB 2017 at [facility] Family Member No Yes   Si g by IntraVENous route every twenty-four (24) hours for 25 days. escitalopram oxalate (LEXAPRO) 20 mg tablet 2017 at AM Family Member Yes Yes   Sig: Take 20 mg by mouth daily. losartan (COZAAR) 25 mg tablet 2017 at AM Family Member Yes Yes   Sig: Take 25 mg by mouth daily. metFORMIN (GLUCOPHAGE) 500 mg tablet 2017 at AM Family Member Yes Yes   Sig: Take 1,000 mg by mouth daily (with breakfast). omega-3 acid ethyl esters (LOVAZA) 1 gram capsule 2017 at AM Family Member Yes Yes   Sig: Take 4 g by mouth daily (with breakfast). sodium chloride (NORMAL SALINE FLUSH) 0.9 %  Family Member Yes No   Si-10 mL by IntraVENous route three (3) times daily. vancomycin 10 gram 1,250 mg IVPB 2017 at AM Family Member No Yes   Si,250 mg by IntraVENous route every eight (8) hours for 26 days.       Facility-Administered Medications: None Thank you,  Daljit Ruffin, PharmD     Contact: [phone]

## 2017-12-22 NOTE — ED TRIAGE NOTES
Pt with PICC line in the right upper arm. Here to have removed secondary to allergic reaction to one of the antibiotics. Pt reports her MD has already called in Prednisone for the allergic reaction.

## 2017-12-22 NOTE — ED NOTES
PICC line removed by this RN with assistance from Ang Roman, [address] [place] [address]. Removed while wearing while wearing sterile gloves, pressure applied to site for 5 minutes and coban wrapped around dressing. No sutures were in place. Catheter shown to Yaneth Mcclendon NP, to verify entire device had been removed. Redness and bruising around site. Patient discharged by Yaneth Mcclendon NP. Papers given and questions answered. Home with family.

## 2017-12-22 NOTE — DISCHARGE INSTRUCTIONS
Allergic Reaction: Care Instructions  Your Care Instructions    An allergic reaction is an excessive response from your immune system to a medicine, chemical, food, insect bite, or other substance. A reaction can range from mild to life-threatening. Some people have a mild rash, hives, and itching or stomach cramps. In severe reactions, swelling of your tongue and throat can close up your airway so that you cannot breathe. Follow-up care is a key part of your treatment and safety. Be sure to make and go to all appointments, and call your doctor if you are having problems. It's also a good idea to know your test results and keep a list of the medicines you take. How can you care for yourself at home? · If you know what caused your allergic reaction, be sure to avoid it. Your allergy may become more severe each time you have a reaction. · Take an over-the-counter antihistamine, such as cetirizine (Zyrtec) or loratadine (Claritin), to treat mild symptoms. Read and follow directions on the label. Some antihistamines can make you feel sleepy. Do not give antihistamines to a child unless you have checked with your doctor first. Mild symptoms include sneezing or an itchy or runny nose; an itchy mouth; a few hives or mild itching; and mild nausea or stomach discomfort. · Do not scratch hives or a rash. Put a cold, moist towel on them or take cool baths to relieve itching. Put ice packs on hives, swelling, or insect stings for 10 to 15 minutes at a time. Put a thin cloth between the ice pack and your skin. Do not take hot baths or showers. They will make the itching worse. · Your doctor may prescribe a shot of epinephrine to carry with you in case you have a severe reaction. Learn how to give yourself the shot and keep it with you at all times. Make sure it is not . · Go to the emergency room every time you have a severe reaction, even if you have used your shot of epinephrine and are feeling better. Symptoms can come back after a shot. · Wear medical alert jewelry that lists your allergies. You can buy this at most drugstores. · If your child has a severe allergy, make sure that his or her teachers, babysitters, coaches, and other caregivers know about the allergy. They should have an epinephrine shot, know how and when to give it, and have a plan to take your child to the hospital.  When should you call for help? Give an epinephrine shot if:  ? · You think you are having a severe allergic reaction. ? · You have symptoms in more than one body area, such as mild nausea and an itchy mouth. ? After giving an epinephrine shot call 911, even if you feel better. ?Call 911 if:  ? · You have symptoms of a severe allergic reaction. These may include:  ¨ Sudden raised, red areas (hives) all over your body. ¨ Swelling of the throat, mouth, lips, or tongue. ¨ Trouble breathing. ¨ Passing out (losing consciousness). Or you may feel very lightheaded or suddenly feel weak, confused, or restless. ? · You have been given an epinephrine shot, even if you feel better. ?Call your doctor now or seek immediate medical care if:  ? · You have symptoms of an allergic reaction, such as:  ¨ A rash or hives (raised, red areas on the skin). ¨ Itching. ¨ Swelling. ¨ Belly pain, nausea, or vomiting. ? Watch closely for changes in your health, and be sure to contact your doctor if:  ? · You do not get better as expected. Where can you learn more? Go to http://anabell-blake.info/. Enter J435 in the search box to learn more about \"Allergic Reaction: Care Instructions. \"  Current as of: September 29, 2016  Content Version: 11.4  © 0509-8915 [employer]. Care instructions adapted under license by [employer] (which disclaims liability or warranty for this information).  If you have questions about a medical condition or this instruction, always ask your healthcare professional. Healthwise, [facility] disclaims any warranty or liability for your use of this information. Cellulitis: Care Instructions  Your Care Instructions    Cellulitis is a skin infection. It often occurs after a break in the skin from a scrape, cut, bite, or puncture, or after a rash. The doctor has checked you carefully, but problems can develop later. If you notice any problems or new symptoms, get medical treatment right away. Follow-up care is a key part of your treatment and safety. Be sure to make and go to all appointments, and call your doctor if you are having problems. It's also a good idea to know your test results and keep a list of the medicines you take. How can you care for yourself at home? · Take your antibiotics as directed. Do not stop taking them just because you feel better. You need to take the full course of antibiotics. · Prop up the infected area on pillows to reduce pain and swelling. Try to keep the area above the level of your heart as often as you can. · If your doctor told you how to care for your wound, follow your doctor's instructions. If you did not get instructions, follow this general advice:  ¨ Wash the wound with clean water 2 times a day. Don't use hydrogen peroxide or alcohol, which can slow healing. ¨ You may cover the wound with a thin layer of petroleum jelly, such as Vaseline, and a nonstick bandage. ¨ Apply more petroleum jelly and replace the bandage as needed. · Be safe with medicines. Take pain medicines exactly as directed. ¨ If the doctor gave you a prescription medicine for pain, take it as prescribed. ¨ If you are not taking a prescription pain medicine, ask your doctor if you can take an over-the-counter medicine. To prevent cellulitis in the future  · Try to prevent cuts, scrapes, or other injuries to your skin. Cellulitis most often occurs where there is a break in the skin.   · If you get a scrape, cut, mild burn, or bite, wash the wound with clean water as soon as you can to help avoid infection. Don't use hydrogen peroxide or alcohol, which can slow healing. · If you have swelling in your legs (edema), support stockings and good skin care may help prevent leg sores and cellulitis. · Take care of your feet, especially if you have diabetes or other conditions that increase the risk of infection. Wear shoes and socks. Do not go barefoot. If you have athlete's foot or other skin problems on your feet, talk to your doctor about how to treat them. When should you call for help? Call your doctor now or seek immediate medical care if:  ? · You have signs that your infection is getting worse, such as:  ¨ Increased pain, swelling, warmth, or redness. ¨ Red streaks leading from the area. ¨ Pus draining from the area. ¨ A fever. ? · You get a rash. ? Watch closely for changes in your health, and be sure to contact your doctor if:  ? · You are not getting better after 1 day (24 hours). ? · You do not get better as expected. Where can you learn more? Go to http://anabellZhilian Zhaopinblake.info/. Beatriz Cruz in the search box to learn more about \"Cellulitis: Care Instructions. \"  Current as of: October 13, 2016  Content Version: 11.4  © 2068-8315 [employer]. Care instructions adapted under license by [employer] (which disclaims liability or warranty for this information). If you have questions about a medical condition or this instruction, always ask your healthcare professional. [place] [address] any warranty or liability for your use of this information. We hope that we have addressed all of your medical concerns. The examination and treatment you received in the Emergency Department were for an emergent problem and were not intended as complete care. It is important that you follow up with your healthcare provider(s) for ongoing care.  If your symptoms worsen or do not improve as expected, and you are unable to reach your usual health care provider(s), you should return to the Emergency Department. Today's healthcare is undergoing tremendous change, and patient satisfaction surveys are one of the many tools to assess the quality of medical care. You may receive a survey from the [employer] organization regarding your experience in the Emergency Department. I hope that your experience has been completely positive, particularly the medical care that I provided. As such, please participate in the survey; anything less than excellent does not meet my expectations or intentions. [address] [facility] and [address] [place] [address] participate in nationally recognized quality of care measures. If your blood pressure is greater than 120/80, as reported below, we urge that you seek medical care to address the potential of high blood pressure, commonly known as hypertension. Hypertension can be hereditary or can be caused by certain medical conditions, pain, stress, or \"white coat syndrome. \"       Please make an appointment with your health care provider(s) for follow up of your Emergency Department visit. VITALS:   Patient Vitals for the past 8 hrs:   Temp Pulse Resp BP SpO2   12/22/17 1200 98.9 °F (37.2 °C) 87 22 131/65 98 %          Thank you for allowing us to provide you with medical care today. We realize that you have many choices for your emergency care needs. Please choose us in the future for any continued health care needs. SHARIF Hart 70: [phone]            Recent Results (from the past 24 hour(s))   METABOLIC PANEL, COMPREHENSIVE    Collection Time: 12/22/17 12:36 PM   Result Value Ref Range    Sodium 142 136 - 145 mmol/L    Potassium 4.1 3.5 - 5.1 mmol/L    Chloride 102 97 - 108 mmol/L    CO2 30 21 - 32 mmol/L    Anion gap 10 5 - 15 mmol/L    Glucose 170 (H) 65 - 100 mg/dL    BUN 9 6 - 20 MG/DL    Creatinine 0.73 0.55 - 1.02 MG/DL    BUN/Creatinine ratio 12 12 - 20      GFR est AA >60 >60 ml/min/1.73m2    GFR est non-AA >60 >60 ml/min/1.73m2    Calcium 8.8 8.5 - 10.1 MG/DL    Bilirubin, total 0.3 0.2 - 1.0 MG/DL    ALT (SGPT) 52 12 - 78 U/L    AST (SGOT) 42 (H) 15 - 37 U/L    Alk. phosphatase 109 45 - 117 U/L    Protein, total 7.2 6.4 - 8.2 g/dL    Albumin 3.0 (L) 3.5 - 5.0 g/dL    Globulin 4.2 (H) 2.0 - 4.0 g/dL    A-G Ratio 0.7 (L) 1.1 - 2.2     CBC WITH AUTOMATED DIFF    Collection Time: 12/22/17 12:36 PM   Result Value Ref Range    WBC 10.0 3.6 - 11.0 K/uL    RBC 3.80 3.80 - 5.20 M/uL    HGB 10.0 (L) 11.5 - 16.0 g/dL    HCT 33.0 (L) 35.0 - 47.0 %    MCV 86.8 80.0 - 99.0 FL    MCH 26.3 26.0 - 34.0 PG    MCHC 30.3 30.0 - 36.5 g/dL    RDW 15.1 (H) 11.5 - 14.5 %    PLATELET 686 570 - 932 K/uL    NEUTROPHILS 62 32 - 75 %    LYMPHOCYTES 12 12 - 49 %    MONOCYTES 10 5 - 13 %    EOSINOPHILS 15 (H) 0 - 7 %    BASOPHILS 1 0 - 1 %    ABS. NEUTROPHILS 6.2 1.8 - 8.0 K/UL    ABS. LYMPHOCYTES 1.2 0.8 - 3.5 K/UL    ABS. MONOCYTES 1.0 0.0 - 1.0 K/UL    ABS. EOSINOPHILS 1.5 (H) 0.0 - 0.4 K/UL    ABS. BASOPHILS 0.1 0.0 - 0.1 K/UL    RBC COMMENTS NORMOCYTIC, NORMOCHROMIC     LACTIC ACID    Collection Time: 12/22/17 12:36 PM   Result Value Ref Range    Lactic acid 2.0 0.4 - 2.0 MMOL/L       No results found.

## 2017-12-22 NOTE — ED PROVIDER NOTES
HPI Comments: 69 y.o. female with past medical history significant for sepsis and PICC line insertion for LT ABX who presents from home with chief complaint of rash. The patient reports that she has had a PICC line in for LT ceftriaxone and vancomycin which has been stopped as of yesterday. She states that she started with a rash on her face and back yesterday morning. The patient also states that she has a possible infected area where her PICC line is inserted. The patient states that she called the ID provider who called in prednisone today for the rash. She states that she wants to have the PICC line removed as she thinks it is not needed anymore. There are no other acute medical concerns at this time. PCP: Tano John MD/ Keeley Hernández (ID)    Past Medical History:  No date: Hypercholesterolemia  8/4/2016: Hyperglycemia due to type 2 diabetes mellitus *  No date: Ill-defined condition      Comment: cellulitis  No date: Major depression      Comment: depression  No date: Pneumonia      Comment: 2011 or so  12/01/2017: S/P PICC central line placement      Comment: Right basilic PICC placement for LT antbx  No date: Transient ischemic attack      The history is provided by the patient. No  was used.         Past Medical History:   Diagnosis Date    Hypercholesterolemia     Hyperglycemia due to type 2 diabetes mellitus ([facility] [address] 8/4/2016    Ill-defined condition     cellulitis    Major depression     depression    Pneumonia     2011 or so    S/P PICC central line placement 13/74/9045    Right basilic PICC placement for LT antbx    Transient ischemic attack        Past Surgical History:   Procedure Laterality Date    COLONOSCOPY  6/24/2016         COLONOSCOPY N/A 6/24/2016    COLONOSCOPY performed by Patricia Arshad MD at [place] [address] N/A 8/26/2016    SIGMOIDOSCOPY FLEXIBLE performed by Patricia Arshad MD at [facility] GI ENDOSCOPY,DIAGNOSIS  8/26/2016              No family history on file. Social History     Social History    Marital status:      Spouse name: N/A    Number of children: N/A    Years of education: N/A     Occupational History    Not on file. Social History Main Topics    Smoking status: Former Smoker    Smokeless tobacco: Not on file    Alcohol use No    Drug use: No    Sexual activity: Not on file     Other Topics Concern    Not on file     Social History Narrative         ALLERGIES: Review of patient's allergies indicates no known allergies. Review of Systems   Constitutional: Negative. Negative for chills, diaphoresis and fever. HENT: Negative. Negative for congestion, rhinorrhea and trouble swallowing. Eyes: Negative. Respiratory: Negative. Negative for shortness of breath. Cardiovascular: Negative. Gastrointestinal: Negative. Negative for abdominal pain, nausea and vomiting. Endocrine: Negative. Musculoskeletal: Negative for arthralgias, myalgias, neck pain and neck stiffness. Skin: Positive for rash. Allergic/Immunologic: Negative. Neurological: Negative. Negative for dizziness, syncope, weakness and headaches. Hematological: Negative. Psychiatric/Behavioral: Negative. Vitals:    12/22/17 1200 12/22/17 1600   BP: 131/65 135/68   Pulse: 87 85   Resp: 22 20   Temp: 98.9 °F (37.2 °C) 98.5 °F (36.9 °C)   SpO2: 98% 96%   Weight: 81.6 kg (180 lb)    Height: 4' 7.5\" (1.41 m)             Physical Exam   Constitutional: She is oriented to person, place, and time. Vital signs are normal. She appears well-developed and well-nourished. Non-toxic appearance. She does not have a sickly appearance. She does not appear ill. HENT:   Head: Normocephalic and atraumatic. Eyes: Conjunctivae, EOM and lids are normal. Pupils are equal, round, and reactive to light.    Neck: Trachea normal, normal range of motion and full passive range of motion without pain. Neck supple. Cardiovascular: Normal rate, regular rhythm, normal heart sounds and normal pulses. Pulmonary/Chest: Effort normal and breath sounds normal.   Abdominal: Soft. Normal appearance and bowel sounds are normal.   Musculoskeletal: Normal range of motion. Arms:  Neurological: She is alert and oriented to person, place, and time. She has normal strength. GCS eye subscore is 4. GCS verbal subscore is 5. GCS motor subscore is 6. Skin: Skin is warm, dry and intact. Rash noted. Rash is urticarial.        Psychiatric: She has a normal mood and affect. Her speech is normal and behavior is normal. Judgment and thought content normal. Cognition and memory are normal.   Nursing note and vitals reviewed. MDM  Number of Diagnoses or Management Options  Allergic reaction, initial encounter: new and requires workup  Cellulitis of right upper extremity: new and requires workup     Amount and/or Complexity of Data Reviewed  Clinical lab tests: ordered  Discuss the patient with other providers: yes (Jamil Archibald)    Risk of Complications, Morbidity, and/or Mortality  Presenting problems: moderate  Diagnostic procedures: moderate  Management options: moderate    Patient Progress  Patient progress: improved    ED Course       Procedures    CONSULT NOTE:   12:42 PM  Garrick FARNSWORTH spoke with Dr Ray Fernandez,   Specialty: FP  Dr Ray Fernandez states that he has not seen this patient in years. Will call ID. Dannielle Barrera CONSULT NOTE:   1:14 PM  Garrick FARNSWORTH spoke with Dr Malia Irizarry,   Specialty: ID  Discussed pt's hx, disposition, and available diagnostic and imaging results. Reviewed care plans. Consultant agrees with plans as outlined. Daptomycin and Invanz IV now. Please pull the PICC line. Steroids and send the patient home.      LABORATORY TESTS:  Recent Results (from the past 12 hour(s))   METABOLIC PANEL, COMPREHENSIVE    Collection Time: 12/22/17 12:36 PM   Result Value Ref Range    Sodium 142 136 - 145 mmol/L    Potassium 4.1 3.5 - 5.1 mmol/L    Chloride 102 97 - 108 mmol/L    CO2 30 21 - 32 mmol/L    Anion gap 10 5 - 15 mmol/L    Glucose 170 (H) 65 - 100 mg/dL    BUN 9 6 - 20 MG/DL    Creatinine 0.73 0.55 - 1.02 MG/DL    BUN/Creatinine ratio 12 12 - 20      GFR est AA >60 >60 ml/min/1.73m2    GFR est non-AA >60 >60 ml/min/1.73m2    Calcium 8.8 8.5 - 10.1 MG/DL    Bilirubin, total 0.3 0.2 - 1.0 MG/DL    ALT (SGPT) 52 12 - 78 U/L    AST (SGOT) 42 (H) 15 - 37 U/L    Alk. phosphatase 109 45 - 117 U/L    Protein, total 7.2 6.4 - 8.2 g/dL    Albumin 3.0 (L) 3.5 - 5.0 g/dL    Globulin 4.2 (H) 2.0 - 4.0 g/dL    A-G Ratio 0.7 (L) 1.1 - 2.2     CBC WITH AUTOMATED DIFF    Collection Time: 12/22/17 12:36 PM   Result Value Ref Range    WBC 10.0 3.6 - 11.0 K/uL    RBC 3.80 3.80 - 5.20 M/uL    HGB 10.0 (L) 11.5 - 16.0 g/dL    HCT 33.0 (L) 35.0 - 47.0 %    MCV 86.8 80.0 - 99.0 FL    MCH 26.3 26.0 - 34.0 PG    MCHC 30.3 30.0 - 36.5 g/dL    RDW 15.1 (H) 11.5 - 14.5 %    PLATELET 477 901 - 597 K/uL    NEUTROPHILS 62 32 - 75 %    LYMPHOCYTES 12 12 - 49 %    MONOCYTES 10 5 - 13 %    EOSINOPHILS 15 (H) 0 - 7 %    BASOPHILS 1 0 - 1 %    ABS. NEUTROPHILS 6.2 1.8 - 8.0 K/UL    ABS. LYMPHOCYTES 1.2 0.8 - 3.5 K/UL    ABS. MONOCYTES 1.0 0.0 - 1.0 K/UL    ABS. EOSINOPHILS 1.5 (H) 0.0 - 0.4 K/UL    ABS.  BASOPHILS 0.1 0.0 - 0.1 K/UL    RBC COMMENTS NORMOCYTIC, NORMOCHROMIC     LACTIC ACID    Collection Time: 12/22/17 12:36 PM   Result Value Ref Range    Lactic acid 2.0 0.4 - 2.0 MMOL/L       IMAGING RESULTS:    CT Results  (Last 48 hours)    None        PFT Results  (Last 48 hours)    None        Echo Results  (Last 48 hours)    None        CXR Results  (Last 48 hours)    None        VENOUS DOPPLER results  (Last 48 hours)    None            MEDICATIONS GIVEN:  Medications   diphenhydrAMINE (BENADRYL) capsule 25 mg (25 mg Oral Given 12/22/17 1334)   predniSONE (DELTASONE) tablet 60 mg (60 mg Oral Given 12/22/17 1239)   famotidine (PEPCID) tablet 20 mg (20 mg Oral Given 12/22/17 1239)   ertapenem (INVANZ) 1 g in 0.9% sodium chloride (MBP/ADV) 50 mL (1 g IntraVENous New Bag 12/22/17 1429)   DAPTOmycin (CUBICIN) 500 mg in sterile water (preservative free) 10 mL IV syringe RF formulation (500 mg IntraVENous Given 12/22/17 1506)       IMPRESSION:  1. Allergic reaction, initial encounter    2. Cellulitis of right upper extremity        PLAN:  1. Prednisone from Dr Derrell Guerrero  2. F/U with Dr Randa Flores (ID)  Return to ED if worse    Discharge Note  3:55 PM  The patient is ready for discharge. The patient's signs, symptoms, diagnosis, and discharge instructions have been discussed and the patient has conveyed their understanding. The patient is to follow up as recommended or return to the ER should their symptoms worsen. Plan has been discussed and the patient is in agreement. Maddie Rollins Pagé FNP-BC.

## 2017-12-23 PROCEDURE — 3331090001 HH PPS REVENUE CREDIT

## 2017-12-23 PROCEDURE — 3331090002 HH PPS REVENUE DEBIT

## 2017-12-24 ENCOUNTER — HOSPITAL ENCOUNTER (EMERGENCY)
Age: 77
Discharge: HOME | End: 2017-12-24
Attending: EMERGENCY MEDICINE
Payer: MEDICARE

## 2017-12-24 VITALS
HEIGHT: 55 IN | HEART RATE: 68 BPM | RESPIRATION RATE: 18 BRPM | OXYGEN SATURATION: 97 % | TEMPERATURE: 98.1 F | SYSTOLIC BLOOD PRESSURE: 139 MMHG | DIASTOLIC BLOOD PRESSURE: 61 MMHG | BODY MASS INDEX: 41.66 KG/M2 | WEIGHT: 180 LBS

## 2017-12-24 VITALS
SYSTOLIC BLOOD PRESSURE: 134 MMHG | RESPIRATION RATE: 17 BRPM | HEART RATE: 90 BPM | TEMPERATURE: 98.5 F | OXYGEN SATURATION: 97 % | DIASTOLIC BLOOD PRESSURE: 88 MMHG

## 2017-12-24 DIAGNOSIS — L50.9 URTICARIA: Primary | ICD-10-CM

## 2017-12-24 PROCEDURE — 74011250637 HC RX REV CODE- 250/637: Performed by: EMERGENCY MEDICINE

## 2017-12-24 PROCEDURE — 74011636637 HC RX REV CODE- 636/637: Performed by: EMERGENCY MEDICINE

## 2017-12-24 PROCEDURE — 99283 EMERGENCY DEPT VISIT LOW MDM: CPT

## 2017-12-24 PROCEDURE — 3331090001 HH PPS REVENUE CREDIT

## 2017-12-24 PROCEDURE — 3331090002 HH PPS REVENUE DEBIT

## 2017-12-24 RX ORDER — GLIPIZIDE 10 MG/1
10 TABLET ORAL 2 TIMES DAILY
COMMUNITY
End: 2020-02-14

## 2017-12-24 RX ORDER — PREDNISONE 20 MG/1
60 TABLET ORAL DAILY
Qty: 15 TAB | Refills: 0 | Status: SHIPPED | OUTPATIENT
Start: 2017-12-24 | End: 2017-12-29

## 2017-12-24 RX ORDER — PREDNISONE 10 MG/1
10 TABLET ORAL
COMMUNITY
End: 2020-02-14

## 2017-12-24 RX ORDER — DIPHENHYDRAMINE HCL 25 MG
50 CAPSULE ORAL
Status: COMPLETED | OUTPATIENT
Start: 2017-12-24 | End: 2017-12-24

## 2017-12-24 RX ORDER — PANTOPRAZOLE SODIUM 20 MG/1
20 TABLET, DELAYED RELEASE ORAL DAILY
COMMUNITY
End: 2017-12-24

## 2017-12-24 RX ORDER — PREDNISONE 20 MG/1
60 TABLET ORAL
Status: COMPLETED | OUTPATIENT
Start: 2017-12-24 | End: 2017-12-24

## 2017-12-24 RX ORDER — DIPHENHYDRAMINE HCL 25 MG
50 CAPSULE ORAL
Qty: 30 CAP | Refills: 0 | Status: SHIPPED | OUTPATIENT
Start: 2017-12-24 | End: 2018-01-03

## 2017-12-24 RX ORDER — FAMOTIDINE 20 MG/1
20 TABLET, FILM COATED ORAL 2 TIMES DAILY
Qty: 20 TAB | Refills: 0 | Status: SHIPPED | OUTPATIENT
Start: 2017-12-24 | End: 2018-01-03

## 2017-12-24 RX ORDER — FAMOTIDINE 20 MG/1
20 TABLET, FILM COATED ORAL
Status: COMPLETED | OUTPATIENT
Start: 2017-12-24 | End: 2017-12-24

## 2017-12-24 RX ORDER — LISINOPRIL 10 MG/1
10 TABLET ORAL DAILY
COMMUNITY
End: 2017-12-24

## 2017-12-24 RX ADMIN — FAMOTIDINE 20 MG: 20 TABLET, FILM COATED ORAL at 20:57

## 2017-12-24 RX ADMIN — DIPHENHYDRAMINE HYDROCHLORIDE 50 MG: 25 CAPSULE ORAL at 20:57

## 2017-12-24 RX ADMIN — PREDNISONE 60 MG: 20 TABLET ORAL at 20:58

## 2017-12-25 PROCEDURE — 3331090001 HH PPS REVENUE CREDIT

## 2017-12-25 PROCEDURE — 3331090002 HH PPS REVENUE DEBIT

## 2017-12-25 NOTE — ED PROVIDER NOTES
HPI Comments: 69 y.o. female with past medical history significant for TIA, depression, PNA, hypercholesterolemia, cellulitis, DM type II, and PICC line placement who presents from home with chief complaint of rash. Patient had a PICC line placed and was given 20 days of abx after being diagnosed with sepsis on 12/1/17 (23 days ago). Approximately 1 week, patient started to develop a rash on her face, neck, chest, and back. She was seen in the ED 2 days ago and was discharged with a Prednisone taper which she has been taking without relief. Patient states that since then, her rash has spread and become more itchy. She denies any change in detergents or exposure to anything unusual. Daughter states that they were told that patient's PICC line was infected during the last ED visit. The PICC line has since been removed. Patient denies having any fever, chills, vaginal pain, or eye pain. There are no other acute medical concerns at this time. Social hx: former smoker, no EtOH use, no drug use  PCP: Andi Elder MD    Note written by Manuelito Mejias, as dictated by Niranjan Alvarez. Merline Gipson MD 8:52 PM      The history is provided by the patient. No  was used.         Past Medical History:   Diagnosis Date    Hypercholesterolemia     Hyperglycemia due to type 2 diabetes mellitus ([facility] [address] 8/4/2016    Ill-defined condition     cellulitis    Major depression     depression    Pneumonia     2011 or so    S/P PICC central line placement 36/63/8643    Right basilic PICC placement for LT antbx    Transient ischemic attack        Past Surgical History:   Procedure Laterality Date    COLONOSCOPY  6/24/2016         COLONOSCOPY N/A 6/24/2016    COLONOSCOPY performed by Davie Layne MD at [address] [place] [address] N/A 8/26/2016    SIGMOIDOSCOPY FLEXIBLE performed by Davie Layne MD at [facility] GI ENDOSCOPY,DIAGNOSIS  8/26/2016 No family history on file. Social History     Social History    Marital status:      Spouse name: N/A    Number of children: N/A    Years of education: N/A     Occupational History    Not on file. Social History Main Topics    Smoking status: Former Smoker    Smokeless tobacco: Not on file    Alcohol use No    Drug use: No    Sexual activity: Not on file     Other Topics Concern    Not on file     Social History Narrative         ALLERGIES: Ceftriaxone and Vancomycin    Review of Systems   Constitutional: Negative for chills and fever. HENT: Negative for ear pain and sore throat. Eyes: Negative for pain. Respiratory: Negative for chest tightness and shortness of breath. Cardiovascular: Negative for chest pain and leg swelling. Gastrointestinal: Negative for abdominal pain, nausea and vomiting. Genitourinary: Negative for dysuria and flank pain. Musculoskeletal: Negative for back pain. Skin: Positive for rash. Neurological: Negative for headaches. All other systems reviewed and are negative. Vitals:    12/24/17 2008   BP: 165/61   Pulse: 79   Resp: 18   Temp: 98.1 °F (36.7 °C)   SpO2: 93%   Weight: 81.6 kg (180 lb)   Height: 4' 7\" (1.397 m)            Physical Exam   Constitutional:   Elderly female   HENT:   Head: Normocephalic and atraumatic. Mouth/Throat: Oropharynx is clear and moist.   Eyes: Conjunctivae and EOM are normal. Pupils are equal, round, and reactive to light. No scleral icterus. Neck: Neck supple. No tracheal deviation present. Cardiovascular: Normal rate, regular rhythm, normal heart sounds and intact distal pulses. Pulmonary/Chest: Effort normal and breath sounds normal. No respiratory distress. Abdominal: Soft. She exhibits no distension. There is no tenderness. There is no rebound and no guarding. Genitourinary:   Genitourinary Comments: deferred   Musculoskeletal: She exhibits no edema. Neurological: She is alert.    Skin: Skin is warm and dry. Rash noted. Rash is urticarial.   Diffuse urticaria to face, trunk, extremities (greater in RUE)   Psychiatric: She has a normal mood and affect. Nursing note and vitals reviewed. Note written by Manuelito Robledo, as dictated by Gina Marquez. Aroldo Hendrix MD 8:52 PM    MDM  Number of Diagnoses or Management Options  Urticaria:   Diagnosis management comments: 68 yo female with urticaria and recently finished long term antibiotics for a shoulder infection.   Plan: increase prednisone to 60mg q day for 5 days, then finish taper, add pepcid and benadryl, follow up with allergy to rule out other causes of urticaria, Return to the emergency department for new/ worsening symptoms or other concerns       Patient Progress  Patient progress: stable    ED Course       Procedures

## 2017-12-25 NOTE — ED TRIAGE NOTES
Pt arrives wit c/o rash to axillary area, face, under breasts since Friday. Pt was on IV abx for sepsis via PICC line. Pt was seen here Friday for same and was given steroids.

## 2017-12-25 NOTE — DISCHARGE INSTRUCTIONS
Hives: Care Instructions  Your Care Instructions  Hives are raised, red, itchy patches of skin. They are also called wheals or welts. They usually have red borders and pale centers. Hives range in size from ¼ inch to 3 inches or more across. They may seem to move from place to place on the skin. Several hives may form a large area of raised, red skin. You can get hives after an insect sting, after taking medicine or eating certain foods, or because of infection or stress. Other causes include plants, things you breathe in, makeup, heat, cold, sunlight, and latex. You cannot spread hives to other people. Hives may last a few minutes or a few days, but a single spot may last less than 36 hours. Follow-up care is a key part of your treatment and safety. Be sure to make and go to all appointments, and call your doctor if you are having problems. It's also a good idea to know your test results and keep a list of the medicines you take. How can you care for yourself at home? · Avoid whatever you think may have caused your hives, such as a certain food or medicine. However, you may not know the cause. · Put a cool, wet towel on the area to relieve itching. · Take an over-the-counter antihistamine, such as diphenhydramine (Benadryl), cetirizine (Zyrtec), or loratadine (Claritin), to help stop the hives and calm the itching. Read and follow directions on the label. These medicines can make you feel sleepy. Do not drive while using them. · Stay away from strong soaps, detergents, and chemicals. These can make itching worse. When should you call for help? Call 911 anytime you think you may need emergency care. For example, call if:  ? · You have symptoms of a severe allergic reaction. These may include:  ¨ Sudden raised, red areas (hives) all over your body. ¨ Swelling of the throat, mouth, lips, or tongue. ¨ Trouble breathing. ¨ Passing out (losing consciousness).  Or you may feel very lightheaded or suddenly feel weak, confused, or restless. ?Call your doctor now or seek immediate medical care if:  ? · You have symptoms of an allergic reaction, such as:  ¨ A rash or hives (raised, red areas on the skin). ¨ Itching. ¨ Swelling. ¨ Belly pain, nausea, or vomiting. ? · You get hives after you start a new medicine. ? · Hives have not gone away after 24 hours. ? Watch closely for changes in your health, and be sure to contact your doctor if:  ? · You do not get better as expected. Where can you learn more? Go to http://anabell-blake.info/. Enter X982 in the search box to learn more about \"Hives: Care Instructions. \"  Current as of: March 20, 2017  Content Version: 11.4  © 7500-9561 [employer]. Care instructions adapted under license by [employer] (which disclaims liability or warranty for this information). If you have questions about a medical condition or this instruction, always ask your healthcare professional. [place] [address] any warranty or liability for your use of this information.

## 2017-12-26 PROCEDURE — 3331090002 HH PPS REVENUE DEBIT

## 2017-12-26 PROCEDURE — 3331090001 HH PPS REVENUE CREDIT

## 2017-12-27 ENCOUNTER — HOME CARE VISIT (OUTPATIENT)
Dept: SCHEDULING | Facility: HOME HEALTH | Age: 77
End: 2017-12-27
Payer: MEDICARE

## 2017-12-27 ENCOUNTER — HOME CARE VISIT (OUTPATIENT)
Dept: HOME HEALTH SERVICES | Facility: HOME HEALTH | Age: 77
End: 2017-12-27
Payer: MEDICARE

## 2017-12-27 PROCEDURE — 3331090002 HH PPS REVENUE DEBIT

## 2017-12-27 PROCEDURE — G0157 HHC PT ASSISTANT EA 15: HCPCS

## 2017-12-27 PROCEDURE — 3331090001 HH PPS REVENUE CREDIT

## 2017-12-28 ENCOUNTER — HOME CARE VISIT (OUTPATIENT)
Dept: SCHEDULING | Facility: HOME HEALTH | Age: 77
End: 2017-12-28
Payer: MEDICARE

## 2017-12-28 VITALS
OXYGEN SATURATION: 97 % | DIASTOLIC BLOOD PRESSURE: 78 MMHG | HEART RATE: 86 BPM | SYSTOLIC BLOOD PRESSURE: 122 MMHG | TEMPERATURE: 98.8 F | RESPIRATION RATE: 16 BRPM

## 2017-12-28 VITALS — OXYGEN SATURATION: 99 % | RESPIRATION RATE: 20 BRPM | HEART RATE: 83 BPM | TEMPERATURE: 99.5 F

## 2017-12-28 LAB
BACTERIA SPEC CULT: NORMAL
SERVICE CMNT-IMP: NORMAL

## 2017-12-28 PROCEDURE — 3331090001 HH PPS REVENUE CREDIT

## 2017-12-28 PROCEDURE — G0151 HHCP-SERV OF PT,EA 15 MIN: HCPCS

## 2017-12-28 PROCEDURE — 3331090002 HH PPS REVENUE DEBIT

## 2020-01-31 ENCOUNTER — APPOINTMENT (OUTPATIENT)
Dept: GENERAL RADIOLOGY | Age: 80
End: 2020-01-31
Attending: NURSE PRACTITIONER
Payer: MEDICAID

## 2020-01-31 ENCOUNTER — HOSPITAL ENCOUNTER (EMERGENCY)
Age: 80
Discharge: HOME | End: 2020-01-31
Attending: EMERGENCY MEDICINE
Payer: MEDICAID

## 2020-01-31 VITALS
SYSTOLIC BLOOD PRESSURE: 155 MMHG | WEIGHT: 173.5 LBS | OXYGEN SATURATION: 93 % | DIASTOLIC BLOOD PRESSURE: 81 MMHG | RESPIRATION RATE: 18 BRPM | TEMPERATURE: 98.6 F | HEART RATE: 102 BPM | HEIGHT: 57 IN | BODY MASS INDEX: 37.43 KG/M2

## 2020-01-31 DIAGNOSIS — L03.011 CELLULITIS OF FINGER OF RIGHT HAND: Primary | ICD-10-CM

## 2020-01-31 DIAGNOSIS — R73.9 HYPERGLYCEMIA: ICD-10-CM

## 2020-01-31 LAB
ALBUMIN SERPL-MCNC: 3.1 G/DL (ref 3.5–5)
ALBUMIN/GLOB SERPL: 0.6 {RATIO} (ref 1.1–2.2)
ALP SERPL-CCNC: 114 U/L (ref 45–117)
ALT SERPL-CCNC: 109 U/L (ref 12–78)
ANION GAP SERPL CALC-SCNC: 11 MMOL/L (ref 5–15)
AST SERPL-CCNC: 117 U/L (ref 15–37)
BASOPHILS # BLD: 0.2 K/UL (ref 0–0.1)
BASOPHILS NFR BLD: 1 % (ref 0–1)
BILIRUB SERPL-MCNC: 0.4 MG/DL (ref 0.2–1)
BUN SERPL-MCNC: 19 MG/DL (ref 6–20)
BUN/CREAT SERPL: 19 (ref 12–20)
CALCIUM SERPL-MCNC: 9.1 MG/DL (ref 8.5–10.1)
CHLORIDE SERPL-SCNC: 100 MMOL/L (ref 97–108)
CO2 SERPL-SCNC: 23 MMOL/L (ref 21–32)
CREAT SERPL-MCNC: 0.98 MG/DL (ref 0.55–1.02)
DIFFERENTIAL METHOD BLD: ABNORMAL
EOSINOPHIL # BLD: 0.4 K/UL (ref 0–0.4)
EOSINOPHIL NFR BLD: 3 % (ref 0–7)
ERYTHROCYTE [DISTWIDTH] IN BLOOD BY AUTOMATED COUNT: 12.6 % (ref 11.5–14.5)
GLOBULIN SER CALC-MCNC: 4.8 G/DL (ref 2–4)
GLUCOSE BLD STRIP.AUTO-MCNC: 394 MG/DL (ref 65–100)
GLUCOSE SERPL-MCNC: 436 MG/DL (ref 65–100)
HCT VFR BLD AUTO: 48.6 % (ref 35–47)
HGB BLD-MCNC: 16.3 G/DL (ref 11.5–16)
IMM GRANULOCYTES # BLD AUTO: 0.1 K/UL (ref 0–0.04)
IMM GRANULOCYTES NFR BLD AUTO: 1 % (ref 0–0.5)
LYMPHOCYTES # BLD: 2.7 K/UL (ref 0.8–3.5)
LYMPHOCYTES NFR BLD: 24 % (ref 12–49)
MCH RBC QN AUTO: 30.8 PG (ref 26–34)
MCHC RBC AUTO-ENTMCNC: 33.5 G/DL (ref 30–36.5)
MCV RBC AUTO: 91.7 FL (ref 80–99)
MONOCYTES # BLD: 0.9 K/UL (ref 0–1)
MONOCYTES NFR BLD: 8 % (ref 5–13)
NEUTS SEG # BLD: 7.3 K/UL (ref 1.8–8)
NEUTS SEG NFR BLD: 63 % (ref 32–75)
NRBC # BLD: 0 K/UL (ref 0–0.01)
NRBC BLD-RTO: 0 PER 100 WBC
PLATELET # BLD AUTO: 320 K/UL (ref 150–400)
PMV BLD AUTO: 10.4 FL (ref 8.9–12.9)
POTASSIUM SERPL-SCNC: 4.4 MMOL/L (ref 3.5–5.1)
PROT SERPL-MCNC: 7.9 G/DL (ref 6.4–8.2)
RBC # BLD AUTO: 5.3 M/UL (ref 3.8–5.2)
SERVICE CMNT-IMP: ABNORMAL
SODIUM SERPL-SCNC: 134 MMOL/L (ref 136–145)
WBC # BLD AUTO: 11.6 K/UL (ref 3.6–11)

## 2020-01-31 PROCEDURE — 74011250636 HC RX REV CODE- 250/636: Performed by: EMERGENCY MEDICINE

## 2020-01-31 PROCEDURE — 74011250637 HC RX REV CODE- 250/637: Performed by: EMERGENCY MEDICINE

## 2020-01-31 PROCEDURE — 80053 COMPREHEN METABOLIC PANEL: CPT

## 2020-01-31 PROCEDURE — 82962 GLUCOSE BLOOD TEST: CPT

## 2020-01-31 PROCEDURE — 85025 COMPLETE CBC W/AUTO DIFF WBC: CPT

## 2020-01-31 PROCEDURE — 99283 EMERGENCY DEPT VISIT LOW MDM: CPT

## 2020-01-31 PROCEDURE — 36415 COLL VENOUS BLD VENIPUNCTURE: CPT

## 2020-01-31 PROCEDURE — 73130 X-RAY EXAM OF HAND: CPT

## 2020-01-31 RX ORDER — AMOXICILLIN AND CLAVULANATE POTASSIUM 875; 125 MG/1; MG/1
1 TABLET, FILM COATED ORAL
Status: COMPLETED | OUTPATIENT
Start: 2020-01-31 | End: 2020-01-31

## 2020-01-31 RX ORDER — AMOXICILLIN AND CLAVULANATE POTASSIUM 875; 125 MG/1; MG/1
1 TABLET, FILM COATED ORAL 2 TIMES DAILY
Qty: 20 TAB | Refills: 0 | Status: SHIPPED | OUTPATIENT
Start: 2020-01-31 | End: 2020-02-10

## 2020-01-31 RX ADMIN — SODIUM CHLORIDE 1000 ML: 900 INJECTION, SOLUTION INTRAVENOUS at 21:40

## 2020-01-31 RX ADMIN — AMOXICILLIN AND CLAVULANATE POTASSIUM 1 TABLET: 875; 125 TABLET, FILM COATED ORAL at 21:41

## 2020-02-01 NOTE — DISCHARGE INSTRUCTIONS

## 2020-02-01 NOTE — ED PROVIDER NOTES
7:39 PM  I have evaluated the patient as the Provider in Triage. I have reviewed Her vital signs and the triage nurse assessment. I have talked with the patient and any available family and advised that I am the provider in triage and have ordered the appropriate study to initiate their work up based on the clinical presentation during my assessment. I have advised that the patient will be accommodated in the Main ED as soon as possible. I have also requested to contact the triage nurse or myself immediately if the patient experiences any changes in their condition during this brief waiting period. Right fourth finger with yellow drainage and discoloration for about a month. Patient is a type 2 diabetic but non compliant with medication. Patient with increased fatigue and \"sleeps all day\" according to her daughter. Tachycardic in triage. 100's. No chest pain or shortness of breath. Patient tearful in triage, daughter states she \"suffers from depression. \" States pain in the hand is 8/10. Denies any fever or chills. Cass Muir, SHARIF        Ms. Mo Espinal is a 76-year-old female who presents to the wound to her right ring finger. The swelling has been there approximately 1 to 2 months. He said that it somewhat waxes and wanes. Sometimes, it is full of pus and drains pus. Other times, it does not drain as much. Currently, it is not draining much pus. She came to the ER today because she says that her daughter saw it for the first time. She has not sought any medical care prior to today. She reports having history of diabetes. However, that she has been off all of her meds for the last year or 2. She said that she got tired of taking all the medicines. She reports some pain in the finger. She denies fevers or chills. No nausea or vomiting. No known trauma to the finger. She denies any other complaints.            Past Medical History:   Diagnosis Date    Hypercholesterolemia     Hyperglycemia due to type 2 diabetes mellitus ([facility] [address] 8/4/2016    Ill-defined condition     cellulitis    Major depression     depression    Pneumonia     2011 or so    S/P PICC central line placement 78/27/5040    Right basilic PICC placement for LT antbx    Transient ischemic attack        Past Surgical History:   Procedure Laterality Date    COLONOSCOPY  6/24/2016         COLONOSCOPY N/A 6/24/2016    COLONOSCOPY performed by Cali Jaimes MD at [address] [facility] N/A 8/26/2016    SIGMOIDOSCOPY FLEXIBLE performed by Cali Jaimes MD at [address] [address] [facility] HX APPENDECTOMY      UPPER GI ENDOSCOPY,DIAGNOSIS  8/26/2016              No family history on file.     Social History     Socioeconomic History    Marital status:      Spouse name: Not on file    Number of children: Not on file    Years of education: Not on file    Highest education level: Not on file   Occupational History    Not on file   Social Needs    Financial resource strain: Not on file    Food insecurity:     Worry: Not on file     Inability: Not on file    Transportation needs:     Medical: Not on file     Non-medical: Not on file   Tobacco Use    Smoking status: Former Smoker   Substance and Sexual Activity    Alcohol use: No    Drug use: No    Sexual activity: Not on file   Lifestyle    Physical activity:     Days per week: Not on file     Minutes per session: Not on file    Stress: Not on file   Relationships    Social connections:     Talks on phone: Not on file     Gets together: Not on file     Attends Sabianist service: Not on file     Active member of club or organization: Not on file     Attends meetings of clubs or organizations: Not on file     Relationship status: Not on file    Intimate partner violence:     Fear of current or ex partner: Not on file     Emotionally abused: Not on file     Physically abused: Not on file     Forced sexual activity: Not on file   Other Topics Concern    Not on file Social History Narrative    Not on file         ALLERGIES: Ceftriaxone and Vancomycin    Review of Systems   Constitutional: Negative for chills and fever. HENT: Negative for rhinorrhea and sore throat. Respiratory: Negative for cough and shortness of breath. Cardiovascular: Negative for chest pain. Gastrointestinal: Negative for abdominal pain, diarrhea, nausea and vomiting. Genitourinary: Negative for dysuria and urgency. Musculoskeletal:        Finger pain   Skin: Positive for wound. Neurological: Negative for dizziness, weakness and light-headedness. There were no vitals filed for this visit. Physical Exam     Vital signs reviewed. Nursing notes reviewed. Const:  No acute distress, well developed, well nourished  Head:  Atraumatic, normocephalic  Eyes:  PERRL, conjunctiva normal, no scleral icterus  Neck:  Supple, trachea midline  Cardiovascular:  RRR, no murmurs, no gallops, no rubs  Resp:  No resp distress, no increased work of breathing, no wheezes, no rhonchi, no rales,  Abd:  Soft, non-tender, non-distended, no rebound, no guarding, no CVA tenderness  MSK:  No pedal edema, normal ROM, mild tenderness to palpation of the distal right 4th digit  Neuro:  Alert and oriented x3, no cranial nerve defect  Skin: Erythema and small ulceration at the dorsal aspect of the base of the nail on the right fourth digit, no fluctuance, no induration, no active drainage  Psych: normal mood and affect, behavior is normal, judgement and thought content is normal          MDM  Number of Diagnoses or Management Options  Cellulitis of finger of right hand:   Hyperglycemia:      Amount and/or Complexity of Data Reviewed  Clinical lab tests: reviewed  Tests in the radiology section of CPT®: reviewed  Review and summarize past medical records: yes    Patient Progress  Patient progress: stable          Ms. Joan Jimenez is a 78-year-old female who presents to the ER with a wound to her right ring finger. Looks like she may have originally had a paronychia still has some persistent cellulitis. There is no drainable abscess at this time. She is well-appearing. Her blood sugar is quite high. She denies being on any medicines for over a year. He said that she still has her medications delivered to her house monthly. Therefore, she can start taking them at home. She is not in DKA. She also has not seen a physician in over a year. I strongly urged her and her daughter that she needs to be compliant with her medications and have proper follow-up. They agreed to do this. I will start her on Augmentin. She is to follow-up with her doctor return to the ER with any new or worsening symptoms.       Procedures

## 2020-02-01 NOTE — ED NOTES
Patient discharged by provider. Discharge paperwork reviewed and patient denies questions.   Leaves in wheelchair and in no apparent distress

## 2020-02-01 NOTE — ED TRIAGE NOTES
Culture Follow-up Pt arrives w/ complaints of sore on 4th finger on R hand that has been there for over a month. Area is red, swollen, and warm to touch. Per pt's daughter, pt is a diabetic and has not taken medication in over a year.

## 2020-02-14 ENCOUNTER — HOSPITAL ENCOUNTER (EMERGENCY)
Age: 80
Discharge: HOME | End: 2020-02-15
Attending: EMERGENCY MEDICINE
Payer: MEDICAID

## 2020-02-14 ENCOUNTER — APPOINTMENT (OUTPATIENT)
Dept: GENERAL RADIOLOGY | Age: 80
End: 2020-02-14
Attending: EMERGENCY MEDICINE
Payer: MEDICAID

## 2020-02-14 DIAGNOSIS — E11.65 TYPE 2 DIABETES MELLITUS WITH HYPERGLYCEMIA, WITHOUT LONG-TERM CURRENT USE OF INSULIN (HCC): Primary | ICD-10-CM

## 2020-02-14 LAB
ALBUMIN SERPL-MCNC: 3 G/DL (ref 3.5–5)
ALBUMIN/GLOB SERPL: 0.6 {RATIO} (ref 1.1–2.2)
ALP SERPL-CCNC: 114 U/L (ref 45–117)
ALT SERPL-CCNC: 102 U/L (ref 12–78)
AMYLASE SERPL-CCNC: 27 U/L (ref 25–115)
ANION GAP SERPL CALC-SCNC: 12 MMOL/L (ref 5–15)
AST SERPL-CCNC: 96 U/L (ref 15–37)
BASE DEFICIT BLDV-SCNC: 1.9 MMOL/L
BASOPHILS # BLD: 0.2 K/UL (ref 0–0.1)
BASOPHILS NFR BLD: 1 % (ref 0–1)
BDY SITE: ABNORMAL
BILIRUB SERPL-MCNC: 0.4 MG/DL (ref 0.2–1)
BUN SERPL-MCNC: 15 MG/DL (ref 6–20)
BUN/CREAT SERPL: 15 (ref 12–20)
CALCIUM SERPL-MCNC: 9.8 MG/DL (ref 8.5–10.1)
CHLORIDE SERPL-SCNC: 100 MMOL/L (ref 97–108)
CO2 SERPL-SCNC: 20 MMOL/L (ref 21–32)
COMMENT, HOLDF: NORMAL
CREAT SERPL-MCNC: 0.99 MG/DL (ref 0.55–1.02)
DIFFERENTIAL METHOD BLD: ABNORMAL
EOSINOPHIL # BLD: 0.2 K/UL (ref 0–0.4)
EOSINOPHIL NFR BLD: 2 % (ref 0–7)
ERYTHROCYTE [DISTWIDTH] IN BLOOD BY AUTOMATED COUNT: 12.7 % (ref 11.5–14.5)
GLOBULIN SER CALC-MCNC: 4.8 G/DL (ref 2–4)
GLUCOSE BLD STRIP.AUTO-MCNC: 337 MG/DL (ref 65–100)
GLUCOSE BLD STRIP.AUTO-MCNC: 432 MG/DL (ref 65–100)
GLUCOSE BLD STRIP.AUTO-MCNC: 459 MG/DL (ref 65–100)
GLUCOSE SERPL-MCNC: 456 MG/DL (ref 65–100)
HCO3 BLDV-SCNC: 22 MMOL/L (ref 23–28)
HCT VFR BLD AUTO: 48.7 % (ref 35–47)
HGB BLD-MCNC: 16.2 G/DL (ref 11.5–16)
IMM GRANULOCYTES # BLD AUTO: 0.1 K/UL (ref 0–0.04)
IMM GRANULOCYTES NFR BLD AUTO: 1 % (ref 0–0.5)
LIPASE SERPL-CCNC: 151 U/L (ref 73–393)
LYMPHOCYTES # BLD: 2.7 K/UL (ref 0.8–3.5)
LYMPHOCYTES NFR BLD: 21 % (ref 12–49)
MAGNESIUM SERPL-MCNC: 1.8 MG/DL (ref 1.6–2.4)
MCH RBC QN AUTO: 30.7 PG (ref 26–34)
MCHC RBC AUTO-ENTMCNC: 33.3 G/DL (ref 30–36.5)
MCV RBC AUTO: 92.2 FL (ref 80–99)
MONOCYTES # BLD: 1.4 K/UL (ref 0–1)
MONOCYTES NFR BLD: 11 % (ref 5–13)
NEUTS SEG # BLD: 8.6 K/UL (ref 1.8–8)
NEUTS SEG NFR BLD: 64 % (ref 32–75)
NRBC # BLD: 0 K/UL (ref 0–0.01)
NRBC BLD-RTO: 0 PER 100 WBC
PCO2 BLDV: 35 MMHG (ref 41–51)
PH BLDV: 7.41 [PH] (ref 7.32–7.42)
PHOSPHATE SERPL-MCNC: 4.2 MG/DL (ref 2.6–4.7)
PLATELET # BLD AUTO: 317 K/UL (ref 150–400)
PMV BLD AUTO: 10.7 FL (ref 8.9–12.9)
PO2 BLDV: 47 MMHG (ref 25–40)
POTASSIUM SERPL-SCNC: 4.9 MMOL/L (ref 3.5–5.1)
PROT SERPL-MCNC: 7.8 G/DL (ref 6.4–8.2)
RBC # BLD AUTO: 5.28 M/UL (ref 3.8–5.2)
SAMPLES BEING HELD,HOLD: NORMAL
SAO2 % BLDV: 84 % (ref 65–88)
SAO2% DEVICE SAO2% SENSOR NAME: ABNORMAL
SERVICE CMNT-IMP: ABNORMAL
SODIUM SERPL-SCNC: 132 MMOL/L (ref 136–145)
SPECIMEN SITE: ABNORMAL
WBC # BLD AUTO: 13.2 K/UL (ref 3.6–11)

## 2020-02-14 PROCEDURE — 74011636637 HC RX REV CODE- 636/637: Performed by: EMERGENCY MEDICINE

## 2020-02-14 PROCEDURE — 82803 BLOOD GASES ANY COMBINATION: CPT

## 2020-02-14 PROCEDURE — 87086 URINE CULTURE/COLONY COUNT: CPT

## 2020-02-14 PROCEDURE — 93005 ELECTROCARDIOGRAM TRACING: CPT

## 2020-02-14 PROCEDURE — 74011250636 HC RX REV CODE- 250/636: Performed by: EMERGENCY MEDICINE

## 2020-02-14 PROCEDURE — 71046 X-RAY EXAM CHEST 2 VIEWS: CPT

## 2020-02-14 PROCEDURE — 96374 THER/PROPH/DIAG INJ IV PUSH: CPT

## 2020-02-14 PROCEDURE — 83690 ASSAY OF LIPASE: CPT

## 2020-02-14 PROCEDURE — 82962 GLUCOSE BLOOD TEST: CPT

## 2020-02-14 PROCEDURE — 84100 ASSAY OF PHOSPHORUS: CPT

## 2020-02-14 PROCEDURE — 96375 TX/PRO/DX INJ NEW DRUG ADDON: CPT

## 2020-02-14 PROCEDURE — 83735 ASSAY OF MAGNESIUM: CPT

## 2020-02-14 PROCEDURE — 81001 URINALYSIS AUTO W/SCOPE: CPT

## 2020-02-14 PROCEDURE — 87147 CULTURE TYPE IMMUNOLOGIC: CPT

## 2020-02-14 PROCEDURE — 99285 EMERGENCY DEPT VISIT HI MDM: CPT

## 2020-02-14 PROCEDURE — 82150 ASSAY OF AMYLASE: CPT

## 2020-02-14 PROCEDURE — 80053 COMPREHEN METABOLIC PANEL: CPT

## 2020-02-14 PROCEDURE — 85025 COMPLETE CBC W/AUTO DIFF WBC: CPT

## 2020-02-14 RX ORDER — ONDANSETRON 2 MG/ML
8 INJECTION INTRAMUSCULAR; INTRAVENOUS
Status: COMPLETED | OUTPATIENT
Start: 2020-02-14 | End: 2020-02-14

## 2020-02-14 RX ORDER — LANOLIN ALCOHOL/MO/W.PET/CERES
325 CREAM (GRAM) TOPICAL
COMMUNITY

## 2020-02-14 RX ORDER — METFORMIN HYDROCHLORIDE 500 MG/1
1000 TABLET ORAL
COMMUNITY
End: 2020-05-27

## 2020-02-14 RX ADMIN — SODIUM CHLORIDE 1000 ML: 900 INJECTION, SOLUTION INTRAVENOUS at 22:14

## 2020-02-14 RX ADMIN — ONDANSETRON 8 MG: 2 INJECTION INTRAMUSCULAR; INTRAVENOUS at 22:15

## 2020-02-14 RX ADMIN — INSULIN HUMAN 10 UNITS: 100 INJECTION, SOLUTION PARENTERAL at 23:22

## 2020-02-15 VITALS
OXYGEN SATURATION: 92 % | RESPIRATION RATE: 18 BRPM | HEIGHT: 57 IN | HEART RATE: 99 BPM | BODY MASS INDEX: 37.54 KG/M2 | WEIGHT: 174 LBS | DIASTOLIC BLOOD PRESSURE: 57 MMHG | TEMPERATURE: 98.1 F | SYSTOLIC BLOOD PRESSURE: 129 MMHG

## 2020-02-15 LAB
APPEARANCE UR: CLEAR
BACTERIA URNS QL MICRO: NEGATIVE /HPF
BILIRUB UR QL: NEGATIVE
COLOR UR: ABNORMAL
EPITH CASTS URNS QL MICRO: ABNORMAL /LPF
GLUCOSE BLD STRIP.AUTO-MCNC: 260 MG/DL (ref 65–100)
GLUCOSE UR STRIP.AUTO-MCNC: >1000 MG/DL
HGB UR QL STRIP: NEGATIVE
HYALINE CASTS URNS QL MICRO: ABNORMAL /LPF (ref 0–5)
KETONES UR QL STRIP.AUTO: NEGATIVE MG/DL
LEUKOCYTE ESTERASE UR QL STRIP.AUTO: NEGATIVE
NITRITE UR QL STRIP.AUTO: NEGATIVE
PH UR STRIP: 5 [PH] (ref 5–8)
PROT UR STRIP-MCNC: NEGATIVE MG/DL
RBC #/AREA URNS HPF: ABNORMAL /HPF (ref 0–5)
SERVICE CMNT-IMP: ABNORMAL
SP GR UR REFRACTOMETRY: 1.01 (ref 1–1.03)
UA: UC IF INDICATED,UAUC: ABNORMAL
UROBILINOGEN UR QL STRIP.AUTO: 0.2 EU/DL (ref 0.2–1)
WBC URNS QL MICRO: ABNORMAL /HPF (ref 0–4)

## 2020-02-15 PROCEDURE — 82962 GLUCOSE BLOOD TEST: CPT

## 2020-02-15 NOTE — ED PROVIDER NOTES
67 y.o. female with past medical history significant for hypercholesteremia, type 2 DM, major depression, and TIA who presents from home accompanied by her daughter and  with chief complaint of high blood sugar. The daughter reports the patient went to dinner with niece this evening where she ate salad and upon returning home, her blood glucose was 576. The daughter notes that the niece contacted her and stated that the patient was dozing off during dinner and complaining of not feeling well. The patient took 2 Metformin immediately after the high reading. The patient endorses associated symptoms of nausea and increased confusion. Of note, the patient's  states she has been nocompliant with her MetforminShe specifically denies abdominal pain, chest pain, back pain, shortness of breath, leg pain/swelling, headache, vomiting, or dysuria. There are no other acute medical concerns at this time. Social hx: former smoker, denies alcohol use, denies illicit drug use  Surgical Hx: appendectomy, colonoscopy, endoscopy, sigmoidoscopy  Allergies: Ceftriaxone, Vancomycin    PCP: Neeru Ponce MD    Note written by Manuelito Kang, as dictated by Cheryln Dakins, MD 9:38 PM          The history is provided by the patient and a relative (daughter). No  was used.         Past Medical History:   Diagnosis Date    Hypercholesterolemia     [facility]    Ill-defined condition     cellulitis    Major depression     depression    Pneumonia     2011 or so    S/P PICC central line placement 32/71/3274    Right basilic PICC placement for LT antbx    Transient ischemic attack        Past Surgical History:   Procedure Laterality Date    COLONOSCOPY  6/24/2016         COLONOSCOPY N/A 6/24/2016    COLONOSCOPY performed by Niesha Rebollar MD at [address] [place] [address] 8/26/2016    SIGMOIDOSCOPY FLEXIBLE performed by Xiomara Koroma MD at [address] [facility] HX APPENDECTOMY      UPPER GI ENDOSCOPY,DIAGNOSIS  8/26/2016              No family history on file. Social History     Socioeconomic History    Marital status:      Spouse name: Not on file    Number of children: Not on file    Years of education: Not on file    Highest education level: Not on file   Occupational History    Not on file   Social Needs    Financial resource strain: Not on file    Food insecurity:     Worry: Not on file     Inability: Not on file    Transportation needs:     Medical: Not on file     Non-medical: Not on file   Tobacco Use    Smoking status: Former Smoker   Substance and Sexual Activity    Alcohol use: No    Drug use: No    Sexual activity: Not on file   Lifestyle    Physical activity:     Days per week: Not on file     Minutes per session: Not on file    Stress: Not on file   Relationships    Social connections:     Talks on phone: Not on file     Gets together: Not on file     Attends Spiritism service: Not on file     Active member of club or organization: Not on file     Attends meetings of clubs or organizations: Not on file     Relationship status: Not on file    Intimate partner violence:     Fear of current or ex partner: Not on file     Emotionally abused: Not on file     Physically abused: Not on file     Forced sexual activity: Not on file   Other Topics Concern    Not on file   Social History Narrative    Not on file         ALLERGIES: Ceftriaxone and Vancomycin    Review of Systems   Respiratory: Negative for shortness of breath. Cardiovascular: Negative for chest pain and leg swelling. Gastrointestinal: Positive for nausea. Negative for abdominal pain and vomiting. Genitourinary: Negative for dysuria. Musculoskeletal: Negative for back pain. Neurological: Negative for headaches. Psychiatric/Behavioral: Positive for confusion. All other systems reviewed and are negative.       Vitals: 02/14/20 2142   BP: 123/78   Pulse: 99   Resp: 18   Temp: 98.1 °F (36.7 °C)   SpO2: 93%   Weight: 78.9 kg (174 lb)   Height: 4' 9\" (1.448 m)            Physical Exam  Vitals signs and nursing note reviewed. CONSTITUTIONAL: Well-appearing; well-nourished; in no mild distress  HEAD: Normocephalic; atraumatic  EYES: PERRL; EOM intact; conjunctiva and sclera are clear bilaterally. ENT: No rhinorrhea; normal pharynx with no tonsillar hypertrophy; mucous membranes pink/moist, no erythema, no exudate. NECK: Supple; non-tender; no cervical lymphadenopathy  CARD: Normal S1, S2; no murmurs, rubs, or gallops. Regular rate and rhythm. RESP: Normal respiratory effort; breath sounds clear and equal bilaterally; no wheezes, rhonchi, or rales. ABD: Normal bowel sounds; non-distended; non-tender; no palpable organomegaly, no masses, no bruits. Back Exam: Normal inspection; no vertebral point tenderness, no CVA tenderness. Normal range of motion. EXT: Normal ROM in all four extremities; non-tender to palpation; no swelling or deformity; distal pulses are normal, no edema. SKIN: Warm; dry; no rash. NEURO:Alert and oriented x 3, coherent, MARYURI-XII grossly intact, sensory and motor are non-focal.        MDM  Number of Diagnoses or Management Options  Diagnosis management comments: Assessment: Elderly female with history of diabetes, hypertension, hypercholesterolemia who presents to the ED with general malaise, nausea, diaphoresis and elevated blood sugar. The patient is noncompliant with her medication according to family. Differential diagnosis include DKA, electrolyte abnormality, infection/sepsis, dehydration. Plan: Lab/IV fluid/antiemetic and analgesia/EKG/chest x-ray/serial exam/ Monitor and Reevaluate.          Amount and/or Complexity of Data Reviewed  Clinical lab tests: ordered and reviewed  Tests in the radiology section of CPT®: ordered and reviewed  Tests in the medicine section of CPT®: reviewed and ordered  Discussion of test results with the performing providers: yes  Decide to obtain previous medical records or to obtain history from someone other than the patient: yes  Obtain history from someone other than the patient: yes  Review and summarize past medical records: yes  Discuss the patient with other providers: yes  Independent visualization of images, tracings, or specimens: yes    Risk of Complications, Morbidity, and/or Mortality  Presenting problems: moderate  Diagnostic procedures: moderate  Management options: moderate    Patient Progress  Patient progress: stable         Procedures    ED EKG interpretation:  Rhythm: normal sinus rhythm; and regular . Rate (approx.): 93; Axis: left axis deviation; P wave: normal; QRS interval: normal ; ST/T wave: non-specific changes; in  Lead: Diffusely; Other findings: abnormal ekg. This EKG was interpreted by Tamiko Adler MD,ED Provider. 10:45PM    XRAY INTERPRETATION (ED MD)  Chest Xray  No acute process seen. Normal heart size. No bony abnormalities. No infiltrate. Jodi Iverson MD 10:46 PM    Progress Note:   Pt has been reexamined by Tamiko Adler MD. Pt is feeling much better. Symptoms have improved. All available results have been reviewed with pt and any available family. Pt understands sx, dx, and tx in ED. Care plan has been outlined and questions have been answered. Pt is ready to go home. Will send home on Hyperglycemia instructions. Outpatient referral with PCP as needed. Written by Tamiko Adler MD,12:27 AM    .   .

## 2020-02-15 NOTE — ED NOTES
Dr. Everton Araya has reviewed discharge instructions with the patient. The patient verbalized understanding. Pt was wheeled out of the ED on her personal rollator by her .

## 2020-02-15 NOTE — ED TRIAGE NOTES
Patient was out having dinner with family member. Family noticed wasn't acting herself. Patient was brought home and daughter checked her blood sugar, 575, and given 2 pills of her metformin.

## 2020-02-15 NOTE — DISCHARGE INSTRUCTIONS
Patient Education        Learning About Low Blood Sugar (Hypoglycemia) in Diabetes  What is low blood sugar (hypoglycemia)? Hypoglycemia means that your blood sugar is low and your body (especially your brain) is not getting enough fuel. If you have diabetes, your blood sugar can go too low if you take too much of some diabetes medicines. It can also go too low if you miss a meal. And it can happen if you exercise too hard without eating enough food. Some medicines used to treat other health problems can cause low blood sugar too. What are the symptoms? Symptoms of low blood sugar can start quickly. It may take just 10 to 15 minutes. If you have had diabetes for many years, you may not realize that your blood sugar is low until it drops very low. · If your blood sugar level drops below 70 (mild low blood sugar), you may feel tired, anxious, dizzy, weak, shaky, or sweaty. You may have a fast heartbeat or blurry vision. · If your blood sugar level continues to drop (usually below 40), your behavior may change. You may feel more irritable. You may find it hard to concentrate or talk. And you may feel unsteady when you stand or walk. You may become too weak or confused to eat something with sugar to raise your blood sugar level. · If your blood sugar level drops very low (usually below 20), you may pass out (lose consciousness). Or you may have a seizure or stroke. If you have symptoms of severe low blood sugar, you need to get medical care right away. If you had a low blood sugar level during the night, you may wake up tired or with a headache. Or you may sweat so much during the night that your pajamas or sheets are damp when you wake up. How is low blood sugar treated? You can treat low blood sugar by eating or drinking something that has 15 grams of carbohydrate. These should be quick-sugar foods.  Check your blood sugar level again 15 minutes after having a quick-sugar food to make sure your level is getting back to your target range. Here are examples of quick-sugar foods that have 15 grams of carbohydrate:  · 3 to 4 glucose tablets  · 1 tube of glucose gel  · Hard candy (such as 3 Jolly Ranchers or 5 to 7 Life Savers)  · 1 tablespoon honey  · 2 tablespoons of raisins  · ½ cup to ¾ cup (4 to 6 ounces) of fruit juice or regular (not diet) soda  · 1 tablespoon of sugar  · 1 cup of fat-free milk  If you have problems with severe low blood sugar, someone else may have to give you a shot of glucagon. This is a hormone that raises blood sugar levels quickly. How can you prevent low blood sugar? You can take steps to prevent low blood sugar. · Follow your treatment plan. Take your insulin or other diabetes medicine exactly as your doctor prescribed it. Talk with your doctor if you're having low blood sugar often. Your medicine may need to be adjusted if it's causing your low blood sugar. · Check your blood sugar levels often. This helps you find early changes before an emergency happens. · Keep a quick-sugar food with you in case your blood sugar level drops low. · Eat small meals more often so that you don't get too hungry between meals. Don't skip meals. · Balance extra exercise with eating more. Check your blood sugar and learn how it changes after exercise. If your blood sugar stays at a normal level, you may not need to eat after you exercise. · Limit how much alcohol you drink. Alcohol can make low blood sugar go even lower. Don't drink alcohol if you have problems recognizing the early signs of low blood sugar. · Keep a diary of your symptoms. This helps you learn when changes in your body may signal low blood sugar. And keep track of how often you have low blood sugar, including when you last ate and what you ate. This will help you learn what causes your blood sugar to drop. · Learn about diabetes and low blood sugar.  Support groups or a diabetes education center can help you understand how medicines, diet, and exercise affect your blood sugar levels. Since low blood sugar levels can quickly become an emergency, be sure to wear medical alert jewelry, such as a medical alert bracelet. This is to let people know you have diabetes so they can get help for you. You can buy this at most drugstores. And make sure your family, friends, and coworkers know the symptoms of low blood sugar. Teach them what to do to get your sugar level up. Follow-up care is a key part of your treatment and safety. Be sure to make and go to all appointments, and call your doctor if you are having problems. It's also a good idea to know your test results and keep a list of the medicines you take. Where can you learn more? Go to http://anabell-blake.info/. Enter Z440 in the search box to learn more about \"Learning About Low Blood Sugar (Hypoglycemia) in Diabetes. \"  Current as of: April 16, 2019  Content Version: 12.2  © 5404-5695 [employer]. Care instructions adapted under license by [employer] (which disclaims liability or warranty for this information). If you have questions about a medical condition or this instruction, always ask your healthcare professional. [address] any warranty or liability for your use of this information.

## 2020-02-15 NOTE — ED NOTES
MD's, nurses and techs at the bedside. 2156  Pt taken to XR via stretcher. 2206  Pt back from XR. Introduced self as primary RN. Initial assessment completed. VSS. Pt denies additional complaints at this time. 2220  Blood obtained from pt's IV. IVF and anti-nausea medication administered. Pt reminded to call out whenever she is ready to give a urine sample. Both  and daughter are at the bedside. Bed locked and in low position with call bell within reach. Instructed pt to press call bell when needed. 2231  Pharmacist at bedside.

## 2020-02-15 NOTE — PROGRESS NOTES
[facility]: MED RECONCILIATION    Comments/Recommendations:   Patient provided medication labels from the blister pack that is filled for her at [facility]. Patient reports that she took her morning meds and progress notes indicate that she also took her evening dose of metformin. Patient  reports that she is not very compliant with some of her medications (mainly aspirin) unless he puts it out for her to take. Compliance would likely improve if the aspirin were included in the blister pack with the rest of her meds. Medications added:     Ferrous sulfate 325 mg PO daily    Medications removed:    Glipizide 10 mg PO BID    Medications adjusted:    Metformin 500 mg PO in the morning and 1000 mg in the evening    Information obtained from: patient, patient's , Rx query, medication labels from blister pack (filled at [facility])    Significant PMH/Disease States:   Past Medical History:   Diagnosis Date    Hypercholesterolemia     Hyperglycemia due to type 2 diabetes mellitus ([facility] [address] 8/4/2016    Ill-defined condition     cellulitis    Major depression     depression    Pneumonia     2011 or so    S/P PICC central line placement 32/17/1264    Right basilic PICC placement for LT antbx    Transient ischemic attack      Chief Complaint for this Admission:   Chief Complaint   Patient presents with    High Blood Sugar     Allergies: Ceftriaxone and Vancomycin    Prior to Admission Medications:     Medication Documentation Review Audit       Reviewed by Eduardo Claude, PHARMD (Pharmacist) on 02/14/20 at 2242      Medication Sig Documenting Provider Last Dose Status Taking?   aspirin delayed-release 81 mg tablet Take 81 mg by mouth daily.  Provider, Historical 2/7/2020 Unknown time Active Yes           Med Note (Daisha Urban Feb 14, 2020 10:39 PM) Supposed to be taking, but misses doses (this is NOT included in her blister pack of medications from [facility])   atorvastatin (LIPITOR) 40 mg tablet Take 40 mg by mouth daily. Provider, Historical 2/14/2020 am Active Yes   escitalopram oxalate (LEXAPRO) 20 mg tablet Take 20 mg by mouth daily. Provider, Historical 2/14/2020 am Active Yes   ferrous sulfate 325 mg (65 mg iron) tablet Take 325 mg by mouth Daily (before breakfast). Provider, Historical 2/14/2020 am Active Yes   losartan (COZAAR) 25 mg tablet Take 25 mg by mouth daily. Provider, Historical 2/14/2020 am Active Yes   metFORMIN (GLUCOPHAGE) 500 mg tablet Take 500 mg by mouth daily (with breakfast). Provider, Historical 2/14/2020 am Active Yes           Med Note (Mayte Notice   Fri Feb 14, 2020 10:41 PM) Takes 500 mg in the morning and 1000 mg in the evening   metFORMIN (GLUCOPHAGE) 500 mg tablet Take 1,000 mg by mouth daily (with dinner). Provider, Historical 2/14/2020 after dinner Active Yes           Med Note (Mayte Notice   Fri Feb 14, 2020 10:41 PM) Takes 500 mg in the morning and 1000 mg in the evening   omega-3 acid ethyl esters (LOVAZA) 1 gram capsule Take 4 g by mouth daily (with breakfast). Provider, Historical 2/14/2020 am Active Yes                  Thank you for the consult,  Julian Patrick [facility]

## 2020-02-16 LAB
ATRIAL RATE: 93 BPM
BACTERIA SPEC CULT: ABNORMAL
CALCULATED P AXIS, ECG09: 33 DEGREES
CALCULATED R AXIS, ECG10: -15 DEGREES
CALCULATED T AXIS, ECG11: 47 DEGREES
CC UR VC: ABNORMAL
DIAGNOSIS, 93000: NORMAL
P-R INTERVAL, ECG05: 160 MS
Q-T INTERVAL, ECG07: 356 MS
QRS DURATION, ECG06: 82 MS
QTC CALCULATION (BEZET), ECG08: 442 MS
SERVICE CMNT-IMP: ABNORMAL
VENTRICULAR RATE, ECG03: 93 BPM

## 2020-05-27 ENCOUNTER — HOSPITAL ENCOUNTER (EMERGENCY)
Age: 80
Discharge: HOME | End: 2020-05-27
Attending: EMERGENCY MEDICINE
Payer: MEDICARE

## 2020-05-27 ENCOUNTER — APPOINTMENT (OUTPATIENT)
Dept: GENERAL RADIOLOGY | Age: 80
End: 2020-05-27
Attending: EMERGENCY MEDICINE
Payer: MEDICARE

## 2020-05-27 VITALS
DIASTOLIC BLOOD PRESSURE: 89 MMHG | TEMPERATURE: 98.7 F | RESPIRATION RATE: 18 BRPM | HEART RATE: 98 BPM | SYSTOLIC BLOOD PRESSURE: 160 MMHG | OXYGEN SATURATION: 93 %

## 2020-05-27 DIAGNOSIS — M25.531 RIGHT WRIST PAIN: Primary | ICD-10-CM

## 2020-05-27 DIAGNOSIS — E11.65 TYPE 2 DIABETES MELLITUS WITH HYPERGLYCEMIA, WITHOUT LONG-TERM CURRENT USE OF INSULIN (HCC): ICD-10-CM

## 2020-05-27 LAB
ANION GAP SERPL CALC-SCNC: 12 MMOL/L (ref 5–15)
BASOPHILS # BLD: 0.1 K/UL (ref 0–0.1)
BASOPHILS NFR BLD: 1 % (ref 0–1)
BUN SERPL-MCNC: 14 MG/DL (ref 6–20)
BUN/CREAT SERPL: 16 (ref 12–20)
CALCIUM SERPL-MCNC: 9.1 MG/DL (ref 8.5–10.1)
CHLORIDE SERPL-SCNC: 103 MMOL/L (ref 97–108)
CO2 SERPL-SCNC: 23 MMOL/L (ref 21–32)
CREAT SERPL-MCNC: 0.89 MG/DL (ref 0.55–1.02)
DIFFERENTIAL METHOD BLD: ABNORMAL
EOSINOPHIL # BLD: 0.4 K/UL (ref 0–0.4)
EOSINOPHIL NFR BLD: 3 % (ref 0–7)
ERYTHROCYTE [DISTWIDTH] IN BLOOD BY AUTOMATED COUNT: 13 % (ref 11.5–14.5)
GLUCOSE SERPL-MCNC: 239 MG/DL (ref 65–100)
HCT VFR BLD AUTO: 44.2 % (ref 35–47)
HGB BLD-MCNC: 14.5 G/DL (ref 11.5–16)
IMM GRANULOCYTES # BLD AUTO: 0.1 K/UL (ref 0–0.04)
IMM GRANULOCYTES NFR BLD AUTO: 1 % (ref 0–0.5)
LYMPHOCYTES # BLD: 3 K/UL (ref 0.8–3.5)
LYMPHOCYTES NFR BLD: 24 % (ref 12–49)
MCH RBC QN AUTO: 29.6 PG (ref 26–34)
MCHC RBC AUTO-ENTMCNC: 32.8 G/DL (ref 30–36.5)
MCV RBC AUTO: 90.2 FL (ref 80–99)
MONOCYTES # BLD: 1 K/UL (ref 0–1)
MONOCYTES NFR BLD: 8 % (ref 5–13)
NEUTS SEG # BLD: 7.7 K/UL (ref 1.8–8)
NEUTS SEG NFR BLD: 63 % (ref 32–75)
NRBC # BLD: 0 K/UL (ref 0–0.01)
NRBC BLD-RTO: 0 PER 100 WBC
PLATELET # BLD AUTO: 308 K/UL (ref 150–400)
PMV BLD AUTO: 10.3 FL (ref 8.9–12.9)
POTASSIUM SERPL-SCNC: 4.1 MMOL/L (ref 3.5–5.1)
RBC # BLD AUTO: 4.9 M/UL (ref 3.8–5.2)
SODIUM SERPL-SCNC: 138 MMOL/L (ref 136–145)
WBC # BLD AUTO: 12.3 K/UL (ref 3.6–11)

## 2020-05-27 PROCEDURE — 85025 COMPLETE CBC W/AUTO DIFF WBC: CPT

## 2020-05-27 PROCEDURE — 73110 X-RAY EXAM OF WRIST: CPT

## 2020-05-27 PROCEDURE — 99284 EMERGENCY DEPT VISIT MOD MDM: CPT

## 2020-05-27 PROCEDURE — 36415 COLL VENOUS BLD VENIPUNCTURE: CPT

## 2020-05-27 PROCEDURE — 74011250637 HC RX REV CODE- 250/637: Performed by: EMERGENCY MEDICINE

## 2020-05-27 PROCEDURE — 80048 BASIC METABOLIC PNL TOTAL CA: CPT

## 2020-05-27 RX ORDER — ACETAMINOPHEN 500 MG
1000 TABLET ORAL 3 TIMES DAILY
Qty: 24 TAB | Refills: 0 | Status: SHIPPED | OUTPATIENT
Start: 2020-05-27 | End: 2020-05-31

## 2020-05-27 RX ORDER — ACETAMINOPHEN 500 MG
1000 TABLET ORAL 3 TIMES DAILY
Qty: 24 TAB | Refills: 0 | Status: SHIPPED | OUTPATIENT
Start: 2020-05-27 | End: 2020-05-27

## 2020-05-27 RX ORDER — ACETAMINOPHEN 500 MG
1000 TABLET ORAL
Status: COMPLETED | OUTPATIENT
Start: 2020-05-27 | End: 2020-05-27

## 2020-05-27 RX ORDER — METFORMIN HYDROCHLORIDE 500 MG/1
500 TABLET ORAL 2 TIMES DAILY WITH MEALS
Qty: 60 TAB | Refills: 0 | Status: SHIPPED | OUTPATIENT
Start: 2020-05-27 | End: 2020-06-26

## 2020-05-27 RX ADMIN — ACETAMINOPHEN 1000 MG: 500 TABLET ORAL at 22:41

## 2020-05-28 ENCOUNTER — PATIENT OUTREACH (OUTPATIENT)
Dept: INTERNAL MEDICINE CLINIC | Age: 80
End: 2020-05-28

## 2020-05-28 NOTE — ED NOTES
The patient was discharged home by Dr. Jeanie Dial and Melissa Landin rn in stable condition. The patient is alert and oriented, is in no respiratory distress and has vital signs within normal limits . The patient's diagnosis, condition and treatment were explained to patient. The patient expressed understanding. Prescriptions given to pt. No work/school note given to pt. A discharge plan has been developed. A  was not involved in the process. Aftercare instructions were given to the patient. Family will transport pt home.

## 2020-05-28 NOTE — ED PROVIDER NOTES
78-year-old female with significant past medical history of type 2 diabetes hypertension previous TIA presented to the ER with complaint of right wrist pain. Patient having right wrist pain with no reported trauma or injuries. No numbness tingling weakness. Pain does not radiate. Pain worsened to palpation. Has not taken anything for pain. Patient daughter who is at bedside says that she has been taking care of her and reports that she has been out of her metformin and he has been receiving her metformin to try to control her diabetes. Have an appointment coming up this next month with her family doctor that she has not seen in several years. Past Medical History:   Diagnosis Date    Hypercholesterolemia     Hyperglycemia due to type 2 diabetes mellitus ([facility] [address] 8/4/2016    Ill-defined condition     cellulitis    Major depression     depression    Pneumonia     2011 or so    S/P PICC central line placement 42/09/0582    Right basilic PICC placement for LT antbx    Transient ischemic attack        Past Surgical History:   Procedure Laterality Date    COLONOSCOPY  6/24/2016         COLONOSCOPY N/A 6/24/2016    COLONOSCOPY performed by María Melissa MD at [address] N/A 8/26/2016    SIGMOIDOSCOPY FLEXIBLE performed by María Melissa MD at [address] [facility] APPENDECTOMY      UPPER GI ENDOSCOPY,DIAGNOSIS  8/26/2016              History reviewed. No pertinent family history.     Social History     Socioeconomic History    Marital status:      Spouse name: Not on file    Number of children: Not on file    Years of education: Not on file    Highest education level: Not on file   Occupational History    Not on file   Social Needs    Financial resource strain: Not on file    Food insecurity     Worry: Not on file     Inability: Not on file    Transportation needs     Medical: Not on file     Non-medical: Not on file   Tobacco Use    Smoking status: Former Smoker    Smokeless tobacco: Never Used   Substance and Sexual Activity    Alcohol use: No    Drug use: No    Sexual activity: Not on file   Lifestyle    Physical activity     Days per week: Not on file     Minutes per session: Not on file    Stress: Not on file   Relationships    Social connections     Talks on phone: Not on file     Gets together: Not on file     Attends Worship service: Not on file     Active member of club or organization: Not on file     Attends meetings of clubs or organizations: Not on file     Relationship status: Not on file    Intimate partner violence     Fear of current or ex partner: Not on file     Emotionally abused: Not on file     Physically abused: Not on file     Forced sexual activity: Not on file   Other Topics Concern    Not on file   Social History Narrative    Not on file         ALLERGIES: Ceftriaxone and Vancomycin    Review of Systems   Constitutional: Negative for chills and fever. HENT: Negative for congestion and sore throat. Eyes: Negative for pain. Respiratory: Negative for shortness of breath. Cardiovascular: Negative for chest pain. Gastrointestinal: Negative for abdominal pain, diarrhea, nausea and vomiting. Genitourinary: Negative for dysuria and flank pain. Musculoskeletal: Positive for arthralgias. Negative for back pain and neck pain. Skin: Negative for rash. Neurological: Negative for dizziness, weakness, numbness and headaches. Vitals:    05/27/20 2135   BP: 183/86   Pulse: 98   Resp: 18   Temp: 98.7 °F (37.1 °C)   SpO2: 96%            Physical Exam  Constitutional:       Appearance: She is well-developed. HENT:      Head: Normocephalic. Eyes:      Conjunctiva/sclera: Conjunctivae normal.   Neck:      Musculoskeletal: Normal range of motion and neck supple. Cardiovascular:      Rate and Rhythm: Normal rate and regular rhythm.    Pulmonary:      Effort: Pulmonary effort is normal. No respiratory distress. Breath sounds: Normal breath sounds. Abdominal:      General: Bowel sounds are normal.      Palpations: Abdomen is soft. Tenderness: There is no abdominal tenderness. Musculoskeletal: Normal range of motion. Right elbow: Normal.     Right wrist: She exhibits tenderness and bony tenderness. She exhibits no swelling, no effusion, no crepitus, no deformity and no laceration. Right forearm: Normal.      Comments: Negative Phalen's, neurovascular intact. No rash or warmth induration or swelling. Skin:     General: Skin is warm. Capillary Refill: Capillary refill takes less than 2 seconds. Findings: No rash. Neurological:      Mental Status: She is alert and oriented to person, place, and time. Comments: No gross motor or sensory deficits          MDM  Number of Diagnoses or Management Options  Right wrist pain:   Type 2 diabetes mellitus with hyperglycemia, without long-term current use of insulin [facility]):   Diagnosis management comments: X-ray unremarkable however patient symptoms seem most consistent with some arthritis. Advised high-dose Tylenol for treatment. Check labs. No electrolyte abnormalities however patient having elevated glucose. No signs of DKA. Will start patient back on her metformin until she can be seen by her PCP.   Given referral to orthopedics for her wrist pain       Amount and/or Complexity of Data Reviewed  Clinical lab tests: reviewed  Tests in the radiology section of CPT®: reviewed      ED Course as of May 28 1929   Wed May 27, 2020   4718 Glucose(!): 239 [ZD]      ED Course User Index  [ZD] Latrice Sorenson MD       Procedures

## 2020-05-28 NOTE — ED NOTES
The patient was discharged home by Dr Jaime Aguirre in stable condition. The patient is alert and oriented, in no respiratory distress and discharge vital signs obtained. The patient's diagnosis, condition and treatment were explained. The patient expressed understanding. Two prescriptions given. No work/school note given. A discharge plan has been developed. A  was not involved in the process. Aftercare instructions were given, patient expressed verbal understanding. Pt ambulatory out of the ED.

## 2020-05-29 NOTE — PROGRESS NOTES
Patient contacted regarding recent discharge and COVID-19 risk. Discussed COVID-19 related testing which was not done at this time. Ambulatory Care Manager contacted the Patient's daughter, Raiza Prince, by telephone to perform post discharge assessment. Verified name and  with Patient's daughter, Raiza Prince, as identifiers. Patient has following risk factors of: ED visit 20. ACM reviewed discharge instructions, medical action plan and red flags related to discharge diagnosis. Reviewed and educated them on any new and changed medications related to discharge diagnosis. Patient's daughter, Raiza Prince, reports patient's spouse Isabel Carpenter has dementia. Daughters listed as decision makers have correct information listed. Writer did not remove spouse as decision maker due to not speaking with patient directly and receiving permission to do so. Education provided regarding infection prevention, and signs and symptoms of COVID-19 and when to seek medical attention with Patient's daughter, Raiza Prince, who verbalized understanding. Discussed exposure protocols and quarantine from [address] you at higher risk for severe illness  and given an opportunity for questions and concerns. The Patient's daughter, Raiza Prince, agrees to contact the COVID-19 hotline [phone] or PCP office for questions related to their healthcare. [facility] provided contact information for future reference. From CDC: Are you at higher risk for severe illness?  Wash your hands often.  Avoid close contact (6 feet, which is about two arm lengths) with people who are sick.  Put distance between yourself and other people if COVID-19 is spreading in your community.  Clean and disinfect frequently touched surfaces.  Avoid all cruise travel and non-essential air travel.  Call your healthcare professional if you have concerns about COVID-19 and your underlying condition or if you are sick.     For more information on steps you can take to protect yourself, see CDC's How to Protect Yourself      Patient/family/caregiver given information for [employer] and agrees to enroll no    Plan for follow-up call in 7-14 days based on severity of symptoms and risk factors.     Barbie Holguin, RN  Ambulatory Care Manager

## 2020-06-13 ENCOUNTER — PATIENT OUTREACH (OUTPATIENT)
Dept: INTERNAL MEDICINE CLINIC | Age: 80
End: 2020-06-13

## 2020-06-13 NOTE — PROGRESS NOTES
Patient resolved from Transition of Care episode on 6/13/20  Discussed COVID-19 related testing which was not done at this time. Patient/family has been provided the following resources and education related to COVID-19:                         Signs, symptoms and red flags related to COVID-19            CDC exposure and quarantine guidelines            [facility] exposure contact - [phone]            Contact for their local Department of Health                 Patient currently reports that the following symptoms have improved:  no new symptoms and no worsening symptoms. No further outreach scheduled with this ACM. Episode of Care resolved. Patient has this ACM contact information if future needs arise.   Gina Anderson, RN  Ambulatory Care Manager

## 2020-12-19 ENCOUNTER — HOSPITAL ENCOUNTER (EMERGENCY)
Age: 80
Discharge: HOME | End: 2020-12-19
Attending: EMERGENCY MEDICINE
Payer: MEDICARE

## 2020-12-19 ENCOUNTER — APPOINTMENT (OUTPATIENT)
Dept: GENERAL RADIOLOGY | Age: 80
End: 2020-12-19
Attending: EMERGENCY MEDICINE
Payer: MEDICARE

## 2020-12-19 VITALS
BODY MASS INDEX: 38.76 KG/M2 | SYSTOLIC BLOOD PRESSURE: 118 MMHG | HEART RATE: 85 BPM | WEIGHT: 179.68 LBS | DIASTOLIC BLOOD PRESSURE: 77 MMHG | HEIGHT: 57 IN | OXYGEN SATURATION: 94 % | TEMPERATURE: 97.4 F | RESPIRATION RATE: 20 BRPM

## 2020-12-19 DIAGNOSIS — R09.81 NASAL CONGESTION: Primary | ICD-10-CM

## 2020-12-19 DIAGNOSIS — R60.0 LOWER LEG EDEMA: ICD-10-CM

## 2020-12-19 LAB
ALBUMIN SERPL-MCNC: 3.1 G/DL (ref 3.5–5)
ALBUMIN/GLOB SERPL: 0.7 {RATIO} (ref 1.1–2.2)
ALP SERPL-CCNC: 93 U/L (ref 45–117)
ALT SERPL-CCNC: 30 U/L (ref 12–78)
ANION GAP SERPL CALC-SCNC: 9 MMOL/L (ref 5–15)
AST SERPL-CCNC: 22 U/L (ref 15–37)
BASOPHILS # BLD: 0.1 K/UL (ref 0–0.1)
BASOPHILS NFR BLD: 1 % (ref 0–1)
BILIRUB SERPL-MCNC: 0.4 MG/DL (ref 0.2–1)
BNP SERPL-MCNC: 65 PG/ML (ref 0–450)
BUN SERPL-MCNC: 18 MG/DL (ref 6–20)
BUN/CREAT SERPL: 21 (ref 12–20)
CALCIUM SERPL-MCNC: 9.6 MG/DL (ref 8.5–10.1)
CHLORIDE SERPL-SCNC: 101 MMOL/L (ref 97–108)
CO2 SERPL-SCNC: 27 MMOL/L (ref 21–32)
COMMENT, HOLDF: NORMAL
CREAT SERPL-MCNC: 0.87 MG/DL (ref 0.55–1.02)
DIFFERENTIAL METHOD BLD: ABNORMAL
EOSINOPHIL # BLD: 0.3 K/UL (ref 0–0.4)
EOSINOPHIL NFR BLD: 2 % (ref 0–7)
ERYTHROCYTE [DISTWIDTH] IN BLOOD BY AUTOMATED COUNT: 12.9 % (ref 11.5–14.5)
GLOBULIN SER CALC-MCNC: 4.2 G/DL (ref 2–4)
GLUCOSE SERPL-MCNC: 260 MG/DL (ref 65–100)
HCT VFR BLD AUTO: 42.9 % (ref 35–47)
HGB BLD-MCNC: 14 G/DL (ref 11.5–16)
IMM GRANULOCYTES # BLD AUTO: 0.1 K/UL (ref 0–0.04)
IMM GRANULOCYTES NFR BLD AUTO: 1 % (ref 0–0.5)
LYMPHOCYTES # BLD: 2.3 K/UL (ref 0.8–3.5)
LYMPHOCYTES NFR BLD: 18 % (ref 12–49)
MCH RBC QN AUTO: 29.5 PG (ref 26–34)
MCHC RBC AUTO-ENTMCNC: 32.6 G/DL (ref 30–36.5)
MCV RBC AUTO: 90.3 FL (ref 80–99)
MONOCYTES # BLD: 1 K/UL (ref 0–1)
MONOCYTES NFR BLD: 8 % (ref 5–13)
NEUTS SEG # BLD: 8.9 K/UL (ref 1.8–8)
NEUTS SEG NFR BLD: 71 % (ref 32–75)
NRBC # BLD: 0 K/UL (ref 0–0.01)
NRBC BLD-RTO: 0 PER 100 WBC
PLATELET # BLD AUTO: 271 K/UL (ref 150–400)
PMV BLD AUTO: 10.7 FL (ref 8.9–12.9)
POTASSIUM SERPL-SCNC: 4.3 MMOL/L (ref 3.5–5.1)
PROT SERPL-MCNC: 7.3 G/DL (ref 6.4–8.2)
RBC # BLD AUTO: 4.75 M/UL (ref 3.8–5.2)
SAMPLES BEING HELD,HOLD: NORMAL
SODIUM SERPL-SCNC: 137 MMOL/L (ref 136–145)
TROPONIN I SERPL-MCNC: <0.05 NG/ML
WBC # BLD AUTO: 12.6 K/UL (ref 3.6–11)

## 2020-12-19 PROCEDURE — 83880 ASSAY OF NATRIURETIC PEPTIDE: CPT

## 2020-12-19 PROCEDURE — 80053 COMPREHEN METABOLIC PANEL: CPT

## 2020-12-19 PROCEDURE — 36415 COLL VENOUS BLD VENIPUNCTURE: CPT

## 2020-12-19 PROCEDURE — 99283 EMERGENCY DEPT VISIT LOW MDM: CPT

## 2020-12-19 PROCEDURE — 85025 COMPLETE CBC W/AUTO DIFF WBC: CPT

## 2020-12-19 PROCEDURE — 84484 ASSAY OF TROPONIN QUANT: CPT

## 2020-12-19 PROCEDURE — 93005 ELECTROCARDIOGRAM TRACING: CPT

## 2020-12-19 PROCEDURE — 71046 X-RAY EXAM CHEST 2 VIEWS: CPT

## 2020-12-19 RX ORDER — METFORMIN HYDROCHLORIDE 500 MG/1
500 TABLET ORAL 2 TIMES DAILY WITH MEALS
COMMUNITY

## 2020-12-19 NOTE — DISCHARGE INSTRUCTIONS
Follow-up closely with your primary care doctor. Take Flonase over-the-counter for your nasal congestion. Elevate your legs while resting to decrease the edema. Wear compression stockings to also help with your edema. Return to the emergency department for any new or worsening symptoms such as worsening shortness of breath, fever, worsening swelling, or any other concerns.

## 2020-12-19 NOTE — ED PROVIDER NOTES
67-year-old female with a history of diabetes, hypertension, hypercholesterolemia presents with leg swelling and nasal congestion. She states that she is having a hard time breathing because of the nasal congestion. She denies any fevers or chills. Is unclear whether she is short of breath from her nasal congestion or really short of breath. She is accompanied by her daughter who brings her in concern for congestive heart failure. Nasal Congestion    Leg Swelling          Past Medical History:   Diagnosis Date    Hypercholesterolemia     Hyperglycemia due to type 2 diabetes mellitus ([facility] [address] 8/4/2016    Ill-defined condition     cellulitis    Major depression     depression    Pneumonia     2011 or so    S/P PICC central line placement 83/04/8899    Right basilic PICC placement for LT antbx    Transient ischemic attack        Past Surgical History:   Procedure Laterality Date    COLONOSCOPY  6/24/2016         COLONOSCOPY N/A 6/24/2016    COLONOSCOPY performed by Meche Hendricks MD at [place] [address] N/A 8/26/2016    SIGMOIDOSCOPY FLEXIBLE performed by Meche Hendricks MD at [address] [place] [address] APPENDECTOMY      UPPER GI ENDOSCOPY,DIAGNOSIS  8/26/2016              History reviewed. No pertinent family history.     Social History     Socioeconomic History    Marital status:      Spouse name: Not on file    Number of children: Not on file    Years of education: Not on file    Highest education level: Not on file   Occupational History    Not on file   Social Needs    Financial resource strain: Not on file    Food insecurity     Worry: Not on file     Inability: Not on file    Transportation needs     Medical: Not on file     Non-medical: Not on file   Tobacco Use    Smoking status: Former Smoker    Smokeless tobacco: Never Used   Substance and Sexual Activity    Alcohol use: No    Drug use: No    Sexual activity: Not on file   Lifestyle    Physical activity     Days per week: Not on file     Minutes per session: Not on file    Stress: Not on file   Relationships    Social connections     Talks on phone: Not on file     Gets together: Not on file     Attends Restoration service: Not on file     Active member of club or organization: Not on file     Attends meetings of clubs or organizations: Not on file     Relationship status: Not on file    Intimate partner violence     Fear of current or ex partner: Not on file     Emotionally abused: Not on file     Physically abused: Not on file     Forced sexual activity: Not on file   Other Topics Concern    Not on file   Social History Narrative    Not on file         ALLERGIES: Ceftriaxone and Vancomycin    Review of Systems   All other systems reviewed and are negative. Vitals:    12/19/20 1317   BP: 118/77   Pulse: 85   Resp: 20   Temp: 97.4 °F (36.3 °C)   SpO2: 94%   Weight: 81.5 kg (179 lb 10.8 oz)   Height: 4' 9\" (1.448 m)            Physical Exam  Vitals signs and nursing note reviewed. Constitutional:       General: She is not in acute distress. HENT:      Head: Normocephalic and atraumatic. Nose: Congestion present. Mouth/Throat:      Mouth: Mucous membranes are moist.   Eyes:      General: No scleral icterus. Conjunctiva/sclera: Conjunctivae normal.      Pupils: Pupils are equal, round, and reactive to light. Neck:      Musculoskeletal: Neck supple. Trachea: No tracheal deviation. Cardiovascular:      Rate and Rhythm: Normal rate and regular rhythm. Pulmonary:      Effort: Pulmonary effort is normal. No respiratory distress. Breath sounds: Normal breath sounds. No wheezing or rales. Abdominal:      General: There is no distension. Palpations: Abdomen is soft. Tenderness: There is no abdominal tenderness. Genitourinary:     Comments: deferred  Musculoskeletal:         General: No deformity. Right lower leg: Edema present.       Left lower leg: Edema present. Skin:     General: Skin is warm and dry. Neurological:      General: No focal deficit present. Mental Status: She is alert. Psychiatric:         Mood and Affect: Mood normal.          [facility]  ED Course as of Dec 19 1539   Sat Dec 19, 2020   1349 EKG shows normal sinus rhythm at rate 83, normal intervals, normal axis, normal ST segments and T waves. [TT]      ED Course User Index  [TT] Ines Trevino MD       Procedures        3:39 PM  Patient re-evaluated. All questions answered. Patient appropriate for discharge. Given return precautions and follow up instructions. LABORATORY TESTS:  Labs Reviewed   CBC WITH AUTOMATED DIFF - Abnormal; Notable for the following components:       Result Value    WBC 12.6 (*)     IMMATURE GRANULOCYTES 1 (*)     ABS. NEUTROPHILS 8.9 (*)     ABS. IMM. GRANS. 0.1 (*)     All other components within normal limits   METABOLIC PANEL, COMPREHENSIVE - Abnormal; Notable for the following components:    Glucose 260 (*)     BUN/Creatinine ratio 21 (*)     Albumin 3.1 (*)     Globulin 4.2 (*)     A-G Ratio 0.7 (*)     All other components within normal limits   NT-PRO BNP   TROPONIN I   SAMPLES BEING HELD       IMAGING RESULTS:  XR CHEST PA LAT   Final Result   IMPRESSION:      No acute pulmonary process. MEDICATIONS GIVEN:  Medications - No data to display    IMPRESSION:  1. Nasal congestion    2. Lower leg edema        PLAN:  1. Current Discharge Medication List        2. Follow-up Information     Follow up With Specialties Details Why [address] [place] [address],[address]  Schedule an appointment as soon as possible for a visit   [address]  [phone]    [address] [place] [address] DEPT Emergency Medicine  If symptoms worsen or new concerns [facility] [phone] [phone]        3. Advised Flonase over-the-counter for nasal congestion.   Compression hose, elevate legs. Return to ED for new or worsening symptoms       Tarlton Campbell.  Rico Bernstein MD

## 2020-12-19 NOTE — ED TRIAGE NOTES
Pt ambulated to the treatment area with a steady gait accompanied by her daughter. Pts daughter states Eric Harley normally has a cough but starting today she also has nasal congestion and swelling in both legs. \" Pt appears in no distress at this time.

## 2020-12-20 LAB
ATRIAL RATE: 83 BPM
CALCULATED P AXIS, ECG09: 44 DEGREES
CALCULATED R AXIS, ECG10: -14 DEGREES
CALCULATED T AXIS, ECG11: 36 DEGREES
DIAGNOSIS, 93000: NORMAL
P-R INTERVAL, ECG05: 186 MS
Q-T INTERVAL, ECG07: 384 MS
QRS DURATION, ECG06: 72 MS
QTC CALCULATION (BEZET), ECG08: 451 MS
VENTRICULAR RATE, ECG03: 83 BPM

## 2021-04-13 ENCOUNTER — TRANSCRIBE ORDER (OUTPATIENT)
Dept: SCHEDULING | Age: 81
End: 2021-04-13

## 2021-04-13 DIAGNOSIS — N18.30 CHRONIC KIDNEY DISEASE, STAGE III (MODERATE) (HCC): ICD-10-CM

## 2021-04-13 DIAGNOSIS — E78.9 DISORDER OF LIPOPROTEIN AND LIPID METABOLISM: ICD-10-CM

## 2021-04-13 DIAGNOSIS — E11.9 DIABETES MELLITUS (HCC): Primary | ICD-10-CM

## 2022-03-19 PROBLEM — M00.9 SEPTIC ARTHRITIS (HCC): Status: ACTIVE | Noted: 2017-12-01

## 2022-03-19 PROBLEM — E66.01 OBESITY, MORBID (HCC): Status: ACTIVE | Noted: 2017-11-27

## 2022-12-29 ENCOUNTER — TRANSCRIBE ORDER (OUTPATIENT)
Dept: SCHEDULING | Age: 82
End: 2022-12-29

## 2022-12-29 DIAGNOSIS — R01.1 CARDIAC MURMUR, UNSPECIFIED: Primary | ICD-10-CM

## 2023-01-10 ENCOUNTER — TRANSCRIBE ORDER (OUTPATIENT)
Dept: SCHEDULING | Age: 83
End: 2023-01-10

## 2023-01-10 DIAGNOSIS — Z12.31 ENCOUNTER FOR SCREENING MAMMOGRAM FOR MALIGNANT NEOPLASM OF BREAST: Primary | ICD-10-CM

## 2023-01-10 DIAGNOSIS — M81.0 AGE-RELATED OSTEOPOROSIS WITHOUT CURRENT PATHOLOGICAL FRACTURE: ICD-10-CM

## 2023-03-04 ENCOUNTER — APPOINTMENT (OUTPATIENT)
Dept: GENERAL RADIOLOGY | Age: 83
End: 2023-03-04
Attending: EMERGENCY MEDICINE
Payer: MEDICARE

## 2023-03-04 ENCOUNTER — HOSPITAL ENCOUNTER (OUTPATIENT)
Age: 83
Setting detail: OBSERVATION
Discharge: HOME HEALTH | End: 2023-03-06
Attending: EMERGENCY MEDICINE | Admitting: INTERNAL MEDICINE
Payer: MEDICARE

## 2023-03-04 DIAGNOSIS — D72.829 LEUKOCYTOSIS, UNSPECIFIED TYPE: ICD-10-CM

## 2023-03-04 DIAGNOSIS — R06.02 SOB (SHORTNESS OF BREATH): ICD-10-CM

## 2023-03-04 DIAGNOSIS — R73.9 HYPERGLYCEMIA: ICD-10-CM

## 2023-03-04 DIAGNOSIS — R09.02 HYPOXIA: Primary | ICD-10-CM

## 2023-03-04 DIAGNOSIS — E11.65 TYPE 2 DIABETES MELLITUS WITH HYPERGLYCEMIA, WITHOUT LONG-TERM CURRENT USE OF INSULIN (HCC): ICD-10-CM

## 2023-03-04 PROBLEM — J96.01 ACUTE RESPIRATORY FAILURE WITH HYPOXIA (HCC): Status: ACTIVE | Noted: 2023-03-04

## 2023-03-04 LAB
ALBUMIN SERPL-MCNC: 3.5 G/DL (ref 3.5–5)
ALBUMIN/GLOB SERPL: 0.9 (ref 1.1–2.2)
ALP SERPL-CCNC: 89 U/L (ref 45–117)
ALT SERPL-CCNC: 26 U/L (ref 12–78)
ANION GAP SERPL CALC-SCNC: 14 MMOL/L (ref 5–15)
AST SERPL-CCNC: 15 U/L (ref 15–37)
BASOPHILS # BLD: 0 K/UL (ref 0–0.1)
BASOPHILS NFR BLD: 0 % (ref 0–1)
BILIRUB SERPL-MCNC: 0.3 MG/DL (ref 0.2–1)
BNP SERPL-MCNC: 790 PG/ML (ref 0–450)
BUN SERPL-MCNC: 27 MG/DL (ref 6–20)
BUN/CREAT SERPL: 28 (ref 12–20)
CALCIUM SERPL-MCNC: 10 MG/DL (ref 8.5–10.1)
CHLORIDE SERPL-SCNC: 100 MMOL/L (ref 97–108)
CO2 SERPL-SCNC: 24 MMOL/L (ref 21–32)
CREAT SERPL-MCNC: 0.96 MG/DL (ref 0.55–1.02)
DIFFERENTIAL METHOD BLD: ABNORMAL
EOSINOPHIL # BLD: 0 K/UL (ref 0–0.4)
EOSINOPHIL NFR BLD: 0 % (ref 0–7)
ERYTHROCYTE [DISTWIDTH] IN BLOOD BY AUTOMATED COUNT: 15.9 % (ref 11.5–14.5)
GLOBULIN SER CALC-MCNC: 3.8 G/DL (ref 2–4)
GLUCOSE SERPL-MCNC: 348 MG/DL (ref 65–100)
HCT VFR BLD AUTO: 33.3 % (ref 35–47)
HGB BLD-MCNC: 9.9 G/DL (ref 11.5–16)
IMM GRANULOCYTES # BLD AUTO: 0.2 K/UL (ref 0–0.04)
IMM GRANULOCYTES NFR BLD AUTO: 1 % (ref 0–0.5)
LYMPHOCYTES # BLD: 1.1 K/UL (ref 0.8–3.5)
LYMPHOCYTES NFR BLD: 6 % (ref 12–49)
MCH RBC QN AUTO: 21.3 PG (ref 26–34)
MCHC RBC AUTO-ENTMCNC: 29.7 G/DL (ref 30–36.5)
MCV RBC AUTO: 71.6 FL (ref 80–99)
MONOCYTES # BLD: 0.6 K/UL (ref 0–1)
MONOCYTES NFR BLD: 3 % (ref 5–13)
NEUTS SEG # BLD: 16.9 K/UL (ref 1.8–8)
NEUTS SEG NFR BLD: 90 % (ref 32–75)
NRBC # BLD: 0 K/UL (ref 0–0.01)
NRBC BLD-RTO: 0 PER 100 WBC
PLATELET # BLD AUTO: 542 K/UL (ref 150–400)
PMV BLD AUTO: 10.1 FL (ref 8.9–12.9)
POTASSIUM SERPL-SCNC: 4.5 MMOL/L (ref 3.5–5.1)
PROT SERPL-MCNC: 7.3 G/DL (ref 6.4–8.2)
RBC # BLD AUTO: 4.65 M/UL (ref 3.8–5.2)
RBC MORPH BLD: ABNORMAL
SARS-COV-2 RDRP RESP QL NAA+PROBE: NOT DETECTED
SODIUM SERPL-SCNC: 138 MMOL/L (ref 136–145)
SOURCE, COVRS: NORMAL
TROPONIN I SERPL HS-MCNC: 28 NG/L (ref 0–51)
WBC # BLD AUTO: 18.8 K/UL (ref 3.6–11)

## 2023-03-04 PROCEDURE — 84484 ASSAY OF TROPONIN QUANT: CPT

## 2023-03-04 PROCEDURE — 71045 X-RAY EXAM CHEST 1 VIEW: CPT

## 2023-03-04 PROCEDURE — 83880 ASSAY OF NATRIURETIC PEPTIDE: CPT

## 2023-03-04 PROCEDURE — 74011250636 HC RX REV CODE- 250/636: Performed by: EMERGENCY MEDICINE

## 2023-03-04 PROCEDURE — 85025 COMPLETE CBC W/AUTO DIFF WBC: CPT

## 2023-03-04 PROCEDURE — 80053 COMPREHEN METABOLIC PANEL: CPT

## 2023-03-04 PROCEDURE — 93005 ELECTROCARDIOGRAM TRACING: CPT

## 2023-03-04 PROCEDURE — 36415 COLL VENOUS BLD VENIPUNCTURE: CPT

## 2023-03-04 PROCEDURE — 99285 EMERGENCY DEPT VISIT HI MDM: CPT

## 2023-03-04 PROCEDURE — 65270000029 HC RM PRIVATE

## 2023-03-04 PROCEDURE — 87040 BLOOD CULTURE FOR BACTERIA: CPT

## 2023-03-04 PROCEDURE — G0378 HOSPITAL OBSERVATION PER HR: HCPCS

## 2023-03-04 PROCEDURE — 87635 SARS-COV-2 COVID-19 AMP PRB: CPT

## 2023-03-04 PROCEDURE — 96374 THER/PROPH/DIAG INJ IV PUSH: CPT

## 2023-03-04 RX ORDER — ONDANSETRON 2 MG/ML
4 INJECTION INTRAMUSCULAR; INTRAVENOUS
Status: DISCONTINUED | OUTPATIENT
Start: 2023-03-04 | End: 2023-03-06 | Stop reason: HOSPADM

## 2023-03-04 RX ORDER — SODIUM CHLORIDE 0.9 % (FLUSH) 0.9 %
5-40 SYRINGE (ML) INJECTION AS NEEDED
Status: DISCONTINUED | OUTPATIENT
Start: 2023-03-04 | End: 2023-03-06 | Stop reason: HOSPADM

## 2023-03-04 RX ORDER — ACETAMINOPHEN 650 MG/1
650 SUPPOSITORY RECTAL
Status: DISCONTINUED | OUTPATIENT
Start: 2023-03-04 | End: 2023-03-06 | Stop reason: HOSPADM

## 2023-03-04 RX ORDER — ONDANSETRON 4 MG/1
4 TABLET, ORALLY DISINTEGRATING ORAL
Status: DISCONTINUED | OUTPATIENT
Start: 2023-03-04 | End: 2023-03-06 | Stop reason: HOSPADM

## 2023-03-04 RX ORDER — ESCITALOPRAM OXALATE 10 MG/1
20 TABLET ORAL DAILY
Status: DISCONTINUED | OUTPATIENT
Start: 2023-03-05 | End: 2023-03-06 | Stop reason: HOSPADM

## 2023-03-04 RX ORDER — ACETAMINOPHEN 325 MG/1
650 TABLET ORAL
Status: DISCONTINUED | OUTPATIENT
Start: 2023-03-04 | End: 2023-03-06 | Stop reason: HOSPADM

## 2023-03-04 RX ORDER — SODIUM CHLORIDE 0.9 % (FLUSH) 0.9 %
5-40 SYRINGE (ML) INJECTION EVERY 8 HOURS
Status: DISCONTINUED | OUTPATIENT
Start: 2023-03-04 | End: 2023-03-06 | Stop reason: HOSPADM

## 2023-03-04 RX ORDER — ATORVASTATIN CALCIUM 20 MG/1
40 TABLET, FILM COATED ORAL DAILY
Status: DISCONTINUED | OUTPATIENT
Start: 2023-03-05 | End: 2023-03-06 | Stop reason: HOSPADM

## 2023-03-04 RX ORDER — PREDNISONE 20 MG/1
60 TABLET ORAL DAILY
COMMUNITY
End: 2023-03-06

## 2023-03-04 RX ORDER — BENZONATATE 100 MG/1
100 CAPSULE ORAL
COMMUNITY

## 2023-03-04 RX ORDER — DEXTROSE MONOHYDRATE 100 MG/ML
0-250 INJECTION, SOLUTION INTRAVENOUS AS NEEDED
Status: DISCONTINUED | OUTPATIENT
Start: 2023-03-04 | End: 2023-03-06 | Stop reason: HOSPADM

## 2023-03-04 RX ORDER — ENOXAPARIN SODIUM 100 MG/ML
40 INJECTION SUBCUTANEOUS DAILY
Status: DISCONTINUED | OUTPATIENT
Start: 2023-03-05 | End: 2023-03-06 | Stop reason: HOSPADM

## 2023-03-04 RX ORDER — POLYETHYLENE GLYCOL 3350 17 G/17G
17 POWDER, FOR SOLUTION ORAL DAILY PRN
Status: DISCONTINUED | OUTPATIENT
Start: 2023-03-04 | End: 2023-03-06 | Stop reason: HOSPADM

## 2023-03-04 RX ORDER — IBUPROFEN 200 MG
4 TABLET ORAL AS NEEDED
Status: DISCONTINUED | OUTPATIENT
Start: 2023-03-04 | End: 2023-03-05

## 2023-03-04 RX ORDER — LEVOFLOXACIN 5 MG/ML
750 INJECTION, SOLUTION INTRAVENOUS ONCE
Status: COMPLETED | OUTPATIENT
Start: 2023-03-04 | End: 2023-03-05

## 2023-03-04 RX ORDER — FUROSEMIDE 10 MG/ML
40 INJECTION INTRAMUSCULAR; INTRAVENOUS DAILY
Status: DISCONTINUED | OUTPATIENT
Start: 2023-03-05 | End: 2023-03-06 | Stop reason: HOSPADM

## 2023-03-04 RX ADMIN — LEVOFLOXACIN 750 MG: 5 INJECTION, SOLUTION INTRAVENOUS at 22:33

## 2023-03-04 NOTE — ED TRIAGE NOTES
Pt assisted to treatment area with Daughter she states that a week ago she took her Mom to  [facility] ER for a cough and congestion was told she had an URI. Since then her cough has gotten worse with whitish yellow sputum. Denies any fevers. She has been sweating profusely as well.

## 2023-03-05 ENCOUNTER — APPOINTMENT (OUTPATIENT)
Dept: NON INVASIVE DIAGNOSTICS | Age: 83
End: 2023-03-05
Attending: INTERNAL MEDICINE
Payer: MEDICARE

## 2023-03-05 LAB
ECHO AO ASC DIAM: 3 CM
ECHO AO ASCENDING AORTA INDEX: 1.65 CM/M2
ECHO AV AREA PEAK VELOCITY: 0.9 CM2
ECHO AV AREA VTI: 1.2 CM2
ECHO AV AREA/BSA PEAK VELOCITY: 0.5 CM2/M2
ECHO AV AREA/BSA VTI: 0.7 CM2/M2
ECHO AV MEAN GRADIENT: 16 MMHG
ECHO AV MEAN VELOCITY: 1.9 M/S
ECHO AV PEAK GRADIENT: 31 MMHG
ECHO AV PEAK VELOCITY: 2.8 M/S
ECHO AV VELOCITY RATIO: 0.29
ECHO AV VTI: 40.8 CM
ECHO LA DIAMETER INDEX: 1.7 CM/M2
ECHO LA DIAMETER: 3.1 CM
ECHO LA VOL 2C: 32 ML (ref 22–52)
ECHO LA VOL 4C: 55 ML (ref 22–52)
ECHO LA VOL BP: 48 ML (ref 22–52)
ECHO LA VOL/BSA BIPLANE: 26 ML/M2 (ref 16–34)
ECHO LA VOLUME AREA LENGTH: 51 ML
ECHO LA VOLUME INDEX A2C: 18 ML/M2 (ref 16–34)
ECHO LA VOLUME INDEX A4C: 30 ML/M2 (ref 16–34)
ECHO LA VOLUME INDEX AREA LENGTH: 28 ML/M2 (ref 16–34)
ECHO LV E' LATERAL VELOCITY: 3 CM/S
ECHO LV E' SEPTAL VELOCITY: 4 CM/S
ECHO LV FRACTIONAL SHORTENING: 30 % (ref 28–44)
ECHO LV INTERNAL DIMENSION DIASTOLE INDEX: 2.36 CM/M2
ECHO LV INTERNAL DIMENSION DIASTOLIC: 4.3 CM (ref 3.9–5.3)
ECHO LV INTERNAL DIMENSION SYSTOLIC INDEX: 1.65 CM/M2
ECHO LV INTERNAL DIMENSION SYSTOLIC: 3 CM
ECHO LV IVSD: 1.2 CM (ref 0.6–0.9)
ECHO LV MASS 2D: 184.7 G (ref 67–162)
ECHO LV MASS INDEX 2D: 101.5 G/M2 (ref 43–95)
ECHO LV POSTERIOR WALL DIASTOLIC: 1.2 CM (ref 0.6–0.9)
ECHO LV RELATIVE WALL THICKNESS RATIO: 0.56
ECHO LVOT AREA: 3.1 CM2
ECHO LVOT AV VTI INDEX: 0.39
ECHO LVOT DIAM: 2 CM
ECHO LVOT MEAN GRADIENT: 1 MMHG
ECHO LVOT PEAK GRADIENT: 3 MMHG
ECHO LVOT PEAK VELOCITY: 0.8 M/S
ECHO LVOT STROKE VOLUME INDEX: 27.8 ML/M2
ECHO LVOT SV: 50.6 ML
ECHO LVOT VTI: 16.1 CM
ECHO MV A VELOCITY: 0.86 M/S
ECHO MV AREA VTI: 1.7 CM2
ECHO MV E DECELERATION TIME (DT): 209 MS
ECHO MV E VELOCITY: 0.79 M/S
ECHO MV E/A RATIO: 0.92
ECHO MV E/E' LATERAL: 26.33
ECHO MV E/E' RATIO (AVERAGED): 23.04
ECHO MV E/E' SEPTAL: 19.75
ECHO MV LVOT VTI INDEX: 1.9
ECHO MV MAX VELOCITY: 1 M/S
ECHO MV MEAN GRADIENT: 2 MMHG
ECHO MV MEAN VELOCITY: 0.7 M/S
ECHO MV PEAK GRADIENT: 4 MMHG
ECHO MV VTI: 30.6 CM
ECHO PV MAX VELOCITY: 1 M/S
ECHO PV PEAK GRADIENT: 4 MMHG
ECHO RV INTERNAL DIMENSION: 3.1 CM
ECHO RV TAPSE: 1.7 CM (ref 1.7–?)
ECHO RVOT PEAK GRADIENT: 2 MMHG
ECHO RVOT PEAK VELOCITY: 0.7 M/S
ECHO TV REGURGITANT MAX VELOCITY: 1.79 M/S
ECHO TV REGURGITANT PEAK GRADIENT: 13 MMHG
EST. AVERAGE GLUCOSE BLD GHB EST-MCNC: 240 MG/DL
GLUCOSE BLD STRIP.AUTO-MCNC: 224 MG/DL (ref 65–117)
GLUCOSE BLD STRIP.AUTO-MCNC: 289 MG/DL (ref 65–117)
GLUCOSE BLD STRIP.AUTO-MCNC: 327 MG/DL (ref 65–117)
GLUCOSE BLD STRIP.AUTO-MCNC: 357 MG/DL (ref 65–117)
HBA1C MFR BLD: 10 % (ref 4–5.6)
PROCALCITONIN SERPL-MCNC: <0.05 NG/ML
SERVICE CMNT-IMP: ABNORMAL

## 2023-03-05 PROCEDURE — G0378 HOSPITAL OBSERVATION PER HR: HCPCS

## 2023-03-05 PROCEDURE — 74011000250 HC RX REV CODE- 250: Performed by: INTERNAL MEDICINE

## 2023-03-05 PROCEDURE — 65270000029 HC RM PRIVATE

## 2023-03-05 PROCEDURE — 83036 HEMOGLOBIN GLYCOSYLATED A1C: CPT

## 2023-03-05 PROCEDURE — 36415 COLL VENOUS BLD VENIPUNCTURE: CPT

## 2023-03-05 PROCEDURE — 93306 TTE W/DOPPLER COMPLETE: CPT

## 2023-03-05 PROCEDURE — 74011636637 HC RX REV CODE- 636/637: Performed by: NURSE PRACTITIONER

## 2023-03-05 PROCEDURE — 82962 GLUCOSE BLOOD TEST: CPT

## 2023-03-05 PROCEDURE — 77010033678 HC OXYGEN DAILY

## 2023-03-05 PROCEDURE — 77030038269 HC DRN EXT URIN PURWCK BARD -A

## 2023-03-05 PROCEDURE — 74011250637 HC RX REV CODE- 250/637: Performed by: INTERNAL MEDICINE

## 2023-03-05 PROCEDURE — 94640 AIRWAY INHALATION TREATMENT: CPT

## 2023-03-05 PROCEDURE — 96372 THER/PROPH/DIAG INJ SC/IM: CPT

## 2023-03-05 PROCEDURE — 84145 PROCALCITONIN (PCT): CPT

## 2023-03-05 PROCEDURE — 94761 N-INVAS EAR/PLS OXIMETRY MLT: CPT

## 2023-03-05 PROCEDURE — 96375 TX/PRO/DX INJ NEW DRUG ADDON: CPT

## 2023-03-05 PROCEDURE — 74011636637 HC RX REV CODE- 636/637: Performed by: INTERNAL MEDICINE

## 2023-03-05 PROCEDURE — 74011250636 HC RX REV CODE- 250/636: Performed by: INTERNAL MEDICINE

## 2023-03-05 PROCEDURE — 93306 TTE W/DOPPLER COMPLETE: CPT | Performed by: INTERNAL MEDICINE

## 2023-03-05 PROCEDURE — 94664 DEMO&/EVAL PT USE INHALER: CPT

## 2023-03-05 RX ORDER — LEVOFLOXACIN 5 MG/ML
750 INJECTION, SOLUTION INTRAVENOUS
Status: DISCONTINUED | OUTPATIENT
Start: 2023-03-05 | End: 2023-03-05

## 2023-03-05 RX ORDER — ARFORMOTEROL TARTRATE 15 UG/2ML
15 SOLUTION RESPIRATORY (INHALATION)
Status: DISCONTINUED | OUTPATIENT
Start: 2023-03-05 | End: 2023-03-06 | Stop reason: HOSPADM

## 2023-03-05 RX ORDER — BENZONATATE 100 MG/1
100 CAPSULE ORAL
Status: DISCONTINUED | OUTPATIENT
Start: 2023-03-05 | End: 2023-03-06 | Stop reason: HOSPADM

## 2023-03-05 RX ORDER — INSULIN LISPRO 100 [IU]/ML
6 INJECTION, SOLUTION INTRAVENOUS; SUBCUTANEOUS ONCE
Status: COMPLETED | OUTPATIENT
Start: 2023-03-05 | End: 2023-03-05

## 2023-03-05 RX ORDER — IBUPROFEN 200 MG
4 TABLET ORAL AS NEEDED
Status: DISCONTINUED | OUTPATIENT
Start: 2023-03-05 | End: 2023-03-06 | Stop reason: HOSPADM

## 2023-03-05 RX ORDER — INSULIN GLARGINE 100 [IU]/ML
0.2 INJECTION, SOLUTION SUBCUTANEOUS DAILY
Status: DISCONTINUED | OUTPATIENT
Start: 2023-03-05 | End: 2023-03-06

## 2023-03-05 RX ORDER — LANOLIN ALCOHOL/MO/W.PET/CERES
3 CREAM (GRAM) TOPICAL
Status: DISCONTINUED | OUTPATIENT
Start: 2023-03-05 | End: 2023-03-06 | Stop reason: HOSPADM

## 2023-03-05 RX ORDER — INSULIN LISPRO 100 [IU]/ML
INJECTION, SOLUTION INTRAVENOUS; SUBCUTANEOUS
Status: DISCONTINUED | OUTPATIENT
Start: 2023-03-05 | End: 2023-03-06 | Stop reason: HOSPADM

## 2023-03-05 RX ORDER — DEXTROSE MONOHYDRATE 100 MG/ML
0-250 INJECTION, SOLUTION INTRAVENOUS AS NEEDED
Status: DISCONTINUED | OUTPATIENT
Start: 2023-03-05 | End: 2023-03-06 | Stop reason: HOSPADM

## 2023-03-05 RX ADMIN — ESCITALOPRAM OXALATE 20 MG: 10 TABLET ORAL at 08:08

## 2023-03-05 RX ADMIN — Medication 5 ML: at 06:00

## 2023-03-05 RX ADMIN — INSULIN GLARGINE 16 UNITS: 100 INJECTION, SOLUTION SUBCUTANEOUS at 08:08

## 2023-03-05 RX ADMIN — Medication 10 UNITS: at 15:53

## 2023-03-05 RX ADMIN — ATORVASTATIN CALCIUM 40 MG: 20 TABLET, FILM COATED ORAL at 08:09

## 2023-03-05 RX ADMIN — BENZONATATE 100 MG: 100 CAPSULE ORAL at 22:22

## 2023-03-05 RX ADMIN — FUROSEMIDE 40 MG: 10 INJECTION, SOLUTION INTRAMUSCULAR; INTRAVENOUS at 00:35

## 2023-03-05 RX ADMIN — Medication 4 UNITS: at 11:26

## 2023-03-05 RX ADMIN — ARFORMOTEROL TARTRATE 15 MCG: 15 SOLUTION RESPIRATORY (INHALATION) at 19:13

## 2023-03-05 RX ADMIN — Medication 10 ML: at 14:00

## 2023-03-05 RX ADMIN — ENOXAPARIN SODIUM 40 MG: 100 INJECTION SUBCUTANEOUS at 08:08

## 2023-03-05 RX ADMIN — INSULIN LISPRO 6 UNITS: 100 INJECTION, SOLUTION INTRAVENOUS; SUBCUTANEOUS at 22:21

## 2023-03-05 RX ADMIN — Medication 3 MG: at 02:20

## 2023-03-05 RX ADMIN — Medication 10 ML: at 22:24

## 2023-03-05 RX ADMIN — Medication 7 UNITS: at 08:08

## 2023-03-05 RX ADMIN — ARFORMOTEROL TARTRATE 15 MCG: 15 SOLUTION RESPIRATORY (INHALATION) at 08:30

## 2023-03-05 RX ADMIN — Medication 10 ML: at 00:35

## 2023-03-05 NOTE — PROGRESS NOTES
Hospitalist Progress Note      NAME: Xiomara Jones   :  1940  MRM:  [id number]    Date/Time: 3/5/2023  1:44 PM           Assessment / Plan:     82F hx obesity, DM p/w dyspnea and AHRF    #AHRF: Requiring 2L NC. Clinical hx might suggest PNA, though imaging w/o consolidation. Murmur noted as well and she improved with diuresis. Likely also with OHS. Procal neg so will stop abx. No cp or tachycardia so doubt PE   - Echo, hold further diuresis   - RT walk test before dc; awaiting PT/OT recs    #Leukocytosis: Potentially due to recent steroids, but with other affected cell lines and diff including abdirahman cells. - Check peripheral smear, consider Heme consult    #DM: Poorly controlled with A1c 10.0%   - Start glargine 0.2u/kg, suspect she will need more   - SSI   - DM mgmt consult fo rinpt and outpt recs    #OLIVE/OHS: High c/f this. Will follow up echo and consider inpt sleep consult                      Care Plan discussed with: Patient, Care Manager, Nursing Staff, and Consultant/Specialist    Discussed:  Care Plan    Prophylaxis:  Lovenox    Disposition:  Home w/Family           ___________________________________________________    Attending Physician: Ollie Fang MD        Subjective:     Chief Complaint:  No acute events overnight. Feels a little better today. No complaints. ROS:  (bold if positive, if negative)    Tolerating PT  Tolerating Diet          Objective:       Vitals:          Last 24hrs VS reviewed since prior progress note.  Most recent are:    Visit Vitals  BP (!) 117/57 (BP 1 Location: Left upper arm, BP Patient Position: At rest)   Pulse 68   Temp 98.5 °F (36.9 °C)   Resp 18   Ht 5' 2\" (1.575 m)   Wt 81.2 kg (179 lb)   SpO2 98%   BMI 32.74 kg/m²     SpO2 Readings from Last 6 Encounters:   23 98%   20 94%   20 93%   20 92%   20 93%   17 99%    O2 Flow Rate (L/min): 2 l/min     Intake/Output Summary (Last 24 hours) at 3/5/2023 1344  Last data filed at 3/5/2023 0358  Gross per 24 hour   Intake 450 ml   Output 1100 ml   Net -650 ml          Exam:     Physical Exam:    Gen:  Well-developed, well-nourished, in no acute distress  HEENT:  Pink conjunctivae, PERRL, hearing intact to voice, moist mucous membranes  Neck:  Supple, without masses, thyroid non-tender  Resp:  No accessory muscle use, clear breath sounds without wheezes rales or rhonchi  Card:  3/6 murmur, normal S1, S2 without thrills, bruits or peripheral edema  Abd:  Soft, non-tender, non-distended, normoactive bowel sounds are present  Musc:  No cyanosis or clubbing  Skin:  No rashes or ulcers, skin turgor is good  Neuro:  Cranial nerves 3-12 are grossly intact,  strength is 5/5 bilaterally and dorsi / plantarflexion is 5/5 bilaterally, follows commands appropriately  Psych:  Good insight, oriented to person, place and time, alert  Medications Reviewed: (see below)    Lab Data Reviewed: (see below)    ______________________________________________________________________    Medications:     Current Facility-Administered Medications   Medication Dose Route Frequency    melatonin tablet 3 mg  3 mg Oral QHS PRN    benzonatate (TESSALON) capsule 100 mg  100 mg Oral TID PRN    arformoteroL (BROVANA) neb solution 15 mcg  15 mcg Nebulization BID RT    levoFLOXacin (LEVAQUIN) 750 mg in D5W IVPB  750 mg IntraVENous Q48H    glucose chewable tablet 16 g  4 Tablet Oral PRN    glucagon (GLUCAGEN) injection 1 mg  1 mg IntraMUSCular PRN    dextrose 10% infusion 0-250 mL  0-250 mL IntraVENous PRN    insulin glargine (LANTUS) injection 16 Units  0.2 Units/kg SubCUTAneous DAILY    insulin lispro (HUMALOG) injection   SubCUTAneous AC&HS    sodium chloride (NS) flush 5-40 mL  5-40 mL IntraVENous Q8H    sodium chloride (NS) flush 5-40 mL  5-40 mL IntraVENous PRN    acetaminophen (TYLENOL) tablet 650 mg  650 mg Oral Q6H PRN    Or    acetaminophen (TYLENOL) suppository 650 mg  650 mg Rectal Q6H PRN    polyethylene glycol (MIRALAX) packet 17 g  17 g Oral DAILY PRN    ondansetron (ZOFRAN ODT) tablet 4 mg  4 mg Oral Q8H PRN    Or    ondansetron (ZOFRAN) injection 4 mg  4 mg IntraVENous Q6H PRN    enoxaparin (LOVENOX) injection 40 mg  40 mg SubCUTAneous DAILY    escitalopram oxalate (LEXAPRO) tablet 20 mg  20 mg Oral DAILY    atorvastatin (LIPITOR) tablet 40 mg  40 mg Oral DAILY    dextrose 10% infusion 0-250 mL  0-250 mL IntraVENous PRN    [Held by provider] furosemide (LASIX) injection 40 mg  40 mg IntraVENous DAILY            Lab Review:     Recent Labs     03/04/23 1909   WBC 18.8*   HGB 9.9*   HCT 33.3*   *     Recent Labs     03/04/23  1909      K 4.5      CO2 24   *   BUN 27*   CREA 0.96   CA 10.0   ALB 3.5   ALT 26     No components found for: [place]

## 2023-03-05 NOTE — H&P
History and Physical  NAME: Josué Borjas  MRN: [id number]  YOB: 1940  AGE: 81 y.o.  female  SOCIAL SECURITY NUMBER: [id number]  Primary Care Provider: Ariel, MD Scott  CODE STATUS: Full Code      ASSESSMENT/PLAN:  Acute respiratory failure with hypoxia. Oxygen saturation dropped to 88% on room air. Patient is currently on 2 L/min oxygen via nasal cannula. Field that this is likely secondary to community-acquired pneumonia, although some element of heart failure cannot be ruled out in the setting of elevated proBNP. Patient was started on antibiotics at [facility] emergency department with IV Levaquin which we will continue for now. Awaiting procalcitonin levels -leukocytosis could be due to the recent steroids. Tessalon Perles 100 mg p.o. every 8 hours as needed cough. Patient is being given 1 dose of Lasix 40 mg IV tonight with future doses being held until results of new echocardiogram arrive. Systolic heart murmur. Previous echocardiogram of 12/1/2017 was essentially normal.  Patient had outpatient orders for echocardiogram on 2/15/2023 which was canceled by the patient herself, and has new echocardiogram ordered for next month on 4/6/23 by cardiologist, Dr. Marcos Girard, which she was completely unaware of. Suspect  aortic stenosis which could certainly be contributing to dyspnea on exertion. Awaiting results of new echocardiogram.  Diabetes mellitus 2, non-insulin-dependent, with hyperglycemia at this time. This is likely secondary to recent prednisone use. Hold metformin while inpatient. Sliding scale insulin as needed. May need to be started on basal bolus insulin though. Patient does not have any systemic steroids ordered for now, only Brovana 15 mcg nebulizer treatments twice a day for pneumonia/chest congestion. Class I obesity. Current BMI is 32.74. Patient is a candidate for obstructive sleep apnea.   She states that she has never had a polysomnogram.  Obesity with alveolar hypoventilation. Alzheimer's disease, per review of records. History of Presenting Illness:   Jael Guzman is a 81 y.o. female who is independent in the activities of daily life. She was able to tell me her name, where she was, the month, but stated that the year was 0. She states that she does not use any assistive devices at home. Patient states that she does have home oxygen to be used as needed. She states that she typically, she does not use the home oxygen at night, but has used it in the past for shortness of breath with exertion. She states that the oxygen use does not occur frequently. Patient states that she has had a cough over the last 2 weeks, productive with whitish phlegm, that has failed to improve. She states that she has not been on any antibiotics for the cough. She has been on prednisone  (60 mg p.o. daily ) as prescribed by the [facility] emergency department apparently 1 week ago. Patient herself could not confirm the prednisone, but this was evident per tonight's emergency depart nursing notes in triage triage at [facility]. Patient does ask us to refer to her daughter regarding her medications at home. Patient denies any leg swelling. She denies particular orthopnea. She states that her shortness of breath over the last 2 weeks at home occurs mostly with exertion. Remarkably, she denied any rhinorrhea, sore throat, postnasal drip, chest congestion, but stated that it is just the frequent coughing that is bothering her. She states that she did not feel febrile, but did not check her temperature at home. Patient states that she has never been diagnosed with sleep apnea. Patient quit smoking cigarettes 60 years ago, at the age of 24 or 22 after smoking briefly. Patient denies chest pain, wheezing. She denies any palpitations.   Patient states that her abdominal girth is at baseline and she does not think that she is accumulating fluid there. Review of ED records from [facility]:  Per triage nursing notes:  Daughter she states that a week ago she took her Mom to  [facility] [place] operated by [facility] ER for a cough and congestion was told she had an URI. Since then her cough has gotten worse with whitish yellow sputum. Denies any fevers. She has been sweating profusely as well. Review of Systems:  A comprehensive review of systems was negative except for that written in the History of Present Illness. Past Medical History:   Diagnosis Date    Alzheimer's disease ([facility] [address]     Hypercholesterolemia     Hyperglycemia due to type 2 diabetes mellitus ([facility] [address] 08/04/2016    Major depression     S/P PICC central line placement 93/09/2477    Right basilic PICC placement for LT antbx    Transient ischemic attack         PAST SURGICAL HISTORY:   Past Surgical History:   Procedure Laterality Date    COLONOSCOPY N/A 06/24/2016    COLONOSCOPY performed by Lina Waller MD at [address] [facility] N/A 08/26/2016    SIGMOIDOSCOPY FLEXIBLE performed by Lina Waller MD at [address] [place] [address] GI ENDOSCOPY,DIAGNOSIS  08/26/2016            Prior to Admission medications    Medication Sig Start Date End Date Taking? Authorizing Provider   benzonatate (Tessalon Perles) 100 mg capsule Take 100 mg by mouth three (3) times daily as needed for Cough. Yes Other, MD Scott   predniSONE (DELTASONE) 20 mg tablet Take 60 mg by mouth daily. Yes Other, MD Scott   metFORMIN (GLUCOPHAGE) 500 mg tablet Take 500 mg by mouth two (2) times daily (with meals). Yes Ariel, MD Scott   atorvastatin (LIPITOR) 40 mg tablet Take 40 mg by mouth daily. Yes Provider, Historical   escitalopram oxalate (LEXAPRO) 20 mg tablet Take 20 mg by mouth daily. Yes Provider, Historical       Allergies   Allergen Reactions    Ceftriaxone Rash    Vancomycin Rash        History reviewed. No pertinent family history.      Social History Tobacco Use    Smoking status: Former     Types: Cigarettes     Quit date:      Years since quittin.2    Smokeless tobacco: Never   Substance Use Topics    Alcohol use: No    Drug use: No       Physical exam  Patient Vitals for the past 24 hrs:   BP Temp Pulse Resp SpO2 Oxygen therapy   23 2353 -- -- 76 -- --    23 2334 131/74 98.2 °F (36.8 °C) 74 20 95 % 2 L/min   23 2206 128/81 98.2 °F (36.8 °C) 81 16 96 % 2 L/min   23 (!) 117/93 -- -- -- 96 % 2 L/min   23 (!) 127/102 -- -- -- 96 % 2 L/min   23 (!) 133/57 -- -- -- 95 % 2 L/min   23 193 114/83 -- -- -- 95 % 2 L/min   23 192 -- -- -- -- 95 % 2 L/min   23 190 (!) 140/67 -- -- -- 94 % 2 L/min   23 1847 (!) 142/71 98.2 °F (36.8 °C) 96 24 (!) 88 % Room air     Estimated body mass index is 32.74 kg/m² as calculated from the following:    Height as of this encounter: 5' 2\" (1.575 m). Weight as of this encounter: 81.2 kg (179 lb). General: In no acute distress. Well developed, well nourished. Head: Normocephalic, atraumatic. Eyes: Anicteric sclera. PERRL. Extraocular muscles intact. ENT: External ears and nose appear normal.  Oral mucosa moist.  Neck: Supple. No jugular venous distention. Heart: Regular rate and rhythm. Grade 2/6 crescendo-decrescendo systolic ejection murmur heard best at the left upper sternal border. Chest: Symmetrical excursion. Clear to auscultation bilaterally. Abdomen: Soft, nontender. No abnormal distention. Bowel sounds are present throughout. Extremities: No gross deformities. No edema, no cyanosis. Feet are warm to touch. Neurological: No lateralizing deficits. Alert, oriented X3. Skin: No jaundice. No rashes.           Recent Results (from the past 24 hour(s))   CBC WITH AUTOMATED DIFF    Collection Time: 23  7:09 PM   Result Value Ref Range    WBC 18.8 (H) 3.6 - 11.0 K/uL    RBC 4.65 3.80 - 5.20 M/uL    HGB 9.9 (L) 11.5 - 16.0 g/dL    HCT 33.3 (L) 35.0 - 47.0 %    MCV 71.6 (L) 80.0 - 99.0 FL    MCH 21.3 (L) 26.0 - 34.0 PG    MCHC 29.7 (L) 30.0 - 36.5 g/dL    RDW 15.9 (H) 11.5 - 14.5 %    PLATELET 358 (H) 076 - 400 K/uL    MPV 10.1 8.9 - 12.9 FL    NRBC 0.0 0.0  WBC    ABSOLUTE NRBC 0.00 0.00 - 0.01 K/uL    NEUTROPHILS 90 (H) 32 - 75 %    LYMPHOCYTES 6 (L) 12 - 49 %    MONOCYTES 3 (L) 5 - 13 %    EOSINOPHILS 0 0 - 7 %    BASOPHILS 0 0 - 1 %    IMMATURE GRANULOCYTES 1 (H) 0 - 0.5 %    ABS. NEUTROPHILS 16.9 (H) 1.8 - 8.0 K/UL    ABS. LYMPHOCYTES 1.1 0.8 - 3.5 K/UL    ABS. MONOCYTES 0.6 0.0 - 1.0 K/UL    ABS. EOSINOPHILS 0.0 0.0 - 0.4 K/UL    ABS. BASOPHILS 0.0 0.0 - 0.1 K/UL    ABS. IMM. GRANS. 0.2 (H) 0.00 - 0.04 K/UL    DF SMEAR SCANNED      RBC COMMENTS MICROCYTOSIS  1+        RBC COMMENTS HYPOCHROMIA  2+        RBC COMMENTS OVALOCYTES  PRESENT        RBC COMMENTS SCHISTOCYTES  PRESENT        RBC COMMENTS MANOHAR CELLS  PRESENT       METABOLIC PANEL, COMPREHENSIVE    Collection Time: 03/04/23  7:09 PM   Result Value Ref Range    Sodium 138 136 - 145 mmol/L    Potassium 4.5 3.5 - 5.1 mmol/L    Chloride 100 97 - 108 mmol/L    CO2 24 21 - 32 mmol/L    Anion gap 14 5 - 15 mmol/L    Glucose 348 (H) 65 - 100 mg/dL    BUN 27 (H) 6 - 20 MG/DL    Creatinine 0.96 0.55 - 1.02 MG/DL    BUN/Creatinine ratio 28 (H) 12 - 20      eGFR 59 (L) >60 ml/min/1.73m2    Calcium 10.0 8.5 - 10.1 MG/DL    Bilirubin, total 0.3 0.2 - 1.0 MG/DL    ALT (SGPT) 26 12 - 78 U/L    AST (SGOT) 15 15 - 37 U/L    Alk.  phosphatase 89 45 - 117 U/L    Protein, total 7.3 6.4 - 8.2 g/dL    Albumin 3.5 3.5 - 5.0 g/dL    Globulin 3.8 2.0 - 4.0 g/dL    A-G Ratio 0.9 (L) 1.1 - 2.2     COVID-19 RAPID TEST    Collection Time: 03/04/23  7:09 PM   Result Value Ref Range    Specimen source Nasopharyngeal      COVID-19 rapid test Not detected NOTD     TROPONIN-HIGH SENSITIVITY    Collection Time: 03/04/23  7:09 PM   Result Value Ref Range    Troponin-High Sensitivity 28 0 - 51 ng/L NT-PRO BNP    Collection Time: 03/04/23  7:09 PM   Result Value Ref Range    NT pro- (H) 0 - 450 PG/ML   EKG, 12 LEAD, INITIAL    Collection Time: 03/04/23  7:10 PM   Result Value Ref Range    Ventricular Rate 94 BPM    Atrial Rate 94 BPM    P-R Interval 138 ms    QRS Duration 82 ms    Q-T Interval 362 ms    QTC Calculation (Bezet) 452 ms    Calculated P Axis 63 degrees    Calculated R Axis 1 degrees    Calculated T Axis 45 degrees    Diagnosis       Normal sinus rhythm  Normal ECG  When compared with ECG of 19-DEC-2020 13:42,  No significant change was found     PROCALCITONIN    Collection Time: 03/05/23  2:05 AM   Result Value Ref Range    Procalcitonin <0.05 ng/mL   SAMPLES BEING HELD    Collection Time: 03/05/23  2:05 AM   Result Value Ref Range    SAMPLES BEING HELD 1lav,1pst     COMMENT        Add-on orders for these samples will be processed based on acceptable specimen integrity and analyte stability, which may vary by analyte. XR CHEST PORT  Narrative: EXAM: Portable CXR. 1947 hours. INDICATION: Cough, hypoxia    FINDINGS:  The lungs appear clear. Heart is normal in size. There is no pulmonary edema. There is no evident pneumothorax or pleural effusion. Impression: No Acute Disease. Echocardiogram of 12/1/2017:  PROCEDURE: This was a routine study. The study included limited 2D  imaging, limited spectral Doppler, and color Doppler. The heart rate was  76 bpm, at the start of the study. Systolic blood pressure was 105 mmHg,  at the start of the study. Diastolic blood pressure was 71 mmHg, at the  start of the study. Images were obtained from the parasternal, apical, and  subcostal acoustic windows. This was a technically difficult study. LEFT VENTRICLE: Size was normal. Systolic function was normal. Ejection  fraction was estimated to be 65 %. There were no regional wall motion  abnormalities.  Wall thickness was normal.     RIGHT VENTRICLE: The size was normal. Systolic function was normal.     LEFT ATRIUM: Size was normal.     ATRIAL SEPTUM: The atrial septum appeared intact. RIGHT ATRIUM: Size was normal.     MITRAL VALVE: Normal valve structure. Not well visualized. DOPPLER: There  was no regurgitation. AORTIC VALVE: Normal valve structure. Not well visualized. DOPPLER:  Transaortic velocity was within the normal range. There was no stenosis. There was no regurgitation. TRICUSPID VALVE: Not well visualized. Normal valve structure. DOPPLER:  There was trivial regurgitation. PULMONIC VALVE: Not well visualized, but normal Doppler findings. AORTA: The root exhibited normal size. PERICARDIUM: There was no pericardial effusion.             Signature:  Alexandre Silver DO

## 2023-03-05 NOTE — PROGRESS NOTES
Problem: Pain  Goal: *Control of Pain  Outcome: Progressing Towards Goal     Problem: Falls - Risk of  Goal: *Absence of Falls  Description: Document Lakisha Fall Risk and appropriate interventions in the flowsheet.   Outcome: Progressing Towards Goal  Note: Fall Risk Interventions:                                Problem: General Medical Care Plan  Goal: *Vital signs within specified parameters  Outcome: Progressing Towards Goal  Goal: *Labs within defined limits  Outcome: Progressing Towards Goal  Goal: *Absence of infection signs and symptoms  Outcome: Progressing Towards Goal  Goal: *Optimal pain control at patient's stated goal  Outcome: Progressing Towards Goal  Goal: *Skin integrity maintained  Outcome: Progressing Towards Goal  Goal: *Fluid volume balance  Outcome: Progressing Towards Goal  Goal: *Optimize nutritional status  Outcome: Progressing Towards Goal  Goal: *Anxiety reduced or absent  Outcome: Progressing Towards Goal  Goal: *Progressive mobility and function (eg: ADL's)  Outcome: Progressing Towards Goal     Problem: Gas Exchange - Impaired  Goal: *Absence of hypoxia  Outcome: Progressing Towards Goal

## 2023-03-05 NOTE — ED PROVIDER NOTES
Date of Service:  3/4/2023    Patient:  Jacqueline Hernandez    Chief Complaint:  Cough and Shortness of Breath       HPI:  Jacqueline Hernandez is a 81 y.o.  female who presents for evaluation of cough and shortness of breath. Patient had been feeling well [facility]. She was placed on steroids and Tessalon Perles. She is continue to use her nebulizer machine at home as directed. She comes in here today with worsening shortness of breath cough and congestion. She arrives hypoxic. Patient does wear oxygen as needed at home but never is ever hypoxic. Today the 88% was the lowest they have ever noticed. Patient denies other complaints. Past Medical History:   Diagnosis Date    Hypercholesterolemia     Hyperglycemia due to type 2 diabetes mellitus ([facility] [address] 8/4/2016    Ill-defined condition     cellulitis    Major depression     depression    Pneumonia     2011 or so    S/P PICC central line placement 91/43/9098    Right basilic PICC placement for LT antbx    Transient ischemic attack        Past Surgical History:   Procedure Laterality Date    COLONOSCOPY  6/24/2016         COLONOSCOPY N/A 6/24/2016    COLONOSCOPY performed by Raman Fabian MD at [address] [facility] N/A 8/26/2016    SIGMOIDOSCOPY FLEXIBLE performed by Raman Fabian MD at [address] GI ENDOSCOPY,DIAGNOSIS  8/26/2016              History reviewed. No pertinent family history.     Social History     Socioeconomic History    Marital status:      Spouse name: Not on file    Number of children: Not on file    Years of education: Not on file    Highest education level: Not on file   Occupational History    Not on file   Tobacco Use    Smoking status: Former    Smokeless tobacco: Never   Substance and Sexual Activity    Alcohol use: No    Drug use: No    Sexual activity: Not on file   Other Topics Concern    Not on file   Social History Narrative    Not on file     Social Determinants of Health     Financial Resource Strain: Not on file   Food Insecurity: Not on file   Transportation Needs: Not on file   Physical Activity: Not on file   Stress: Not on file   Social Connections: Not on file   Intimate Partner Violence: Not on file   Housing Stability: Not on file         ALLERGIES: Ceftriaxone and Vancomycin    Review of Systems   All other systems reviewed and are negative. Vitals:    03/04/23 1902 03/04/23 1915 03/04/23 1924 03/04/23 1934   BP: (!) 140/67 (!) 149/63     Pulse:       Resp:       Temp:       SpO2: 94%  95%    Weight:       Height:    5' 2\" (1.575 m)            Physical Exam  Vitals and nursing note reviewed. Constitutional:       Appearance: She is well-developed. HENT:      Head: Normocephalic and atraumatic. Cardiovascular:      Rate and Rhythm: Normal rate. Pulmonary:      Effort: Pulmonary effort is normal.      Breath sounds: Wheezing and rhonchi present. Chest:      Chest wall: No mass. Abdominal:      Palpations: Abdomen is soft. Skin:     General: Skin is warm. Neurological:      Mental Status: She is alert. Comments: Baseline   Psychiatric:         Mood and Affect: Mood normal.        Medical Decision Making  Amount and/or Complexity of Data Reviewed  Labs: ordered. Decision-making details documented in ED Course. Radiology: ordered. Decision-making details documented in ED Course. ECG/medicine tests: ordered and independent interpretation performed. Decision-making details documented in ED Course. Risk  Prescription drug management. Decision regarding hospitalization. ED Course as of 03/04/23 2049   Sat Mar 04, 2023   2012 EKG 1910  Normal sinus rhythm at 94 bpm.  With normal axis and intervals.   No STEMI [GG]   2027 IMMATURE GRANULOCYTES(!): 1 [GG]   2028 COVID-19 rapid test: Not detected [GG]      ED Course User Index  [GG] Cole Pinon DO     VITAL SIGNS:  Patient Vitals for the past 4 hrs:   Temp Pulse Resp BP SpO2   03/04/23 1930 -- -- -- 114/83 95 %   03/04/23 1924 -- -- -- -- 95 %   03/04/23 1915 -- -- -- (!) 149/63 --   03/04/23 1902 -- -- -- (!) 140/67 94 %   03/04/23 1847 98.2 °F (36.8 °C) 96 24 (!) 142/71 (!) 88 %           LABS:  The Following labs have been ordered while in the emergency department and I have independently evaluated them. Recent Results (from the past 6 hour(s))   CBC WITH AUTOMATED DIFF    Collection Time: 03/04/23  7:09 PM   Result Value Ref Range    WBC 18.8 (H) 3.6 - 11.0 K/uL    RBC 4.65 3.80 - 5.20 M/uL    HGB 9.9 (L) 11.5 - 16.0 g/dL    HCT 33.3 (L) 35.0 - 47.0 %    MCV 71.6 (L) 80.0 - 99.0 FL    MCH 21.3 (L) 26.0 - 34.0 PG    MCHC 29.7 (L) 30.0 - 36.5 g/dL    RDW 15.9 (H) 11.5 - 14.5 %    PLATELET 104 (H) 427 - 400 K/uL    MPV 10.1 8.9 - 12.9 FL    NRBC 0.0 0.0  WBC    ABSOLUTE NRBC 0.00 0.00 - 0.01 K/uL    NEUTROPHILS 90 (H) 32 - 75 %    LYMPHOCYTES 6 (L) 12 - 49 %    MONOCYTES 3 (L) 5 - 13 %    EOSINOPHILS 0 0 - 7 %    BASOPHILS 0 0 - 1 %    IMMATURE GRANULOCYTES 1 (H) 0 - 0.5 %    ABS. NEUTROPHILS 16.9 (H) 1.8 - 8.0 K/UL    ABS. LYMPHOCYTES 1.1 0.8 - 3.5 K/UL    ABS. MONOCYTES 0.6 0.0 - 1.0 K/UL    ABS. EOSINOPHILS 0.0 0.0 - 0.4 K/UL    ABS. BASOPHILS 0.0 0.0 - 0.1 K/UL    ABS. IMM.  GRANS. 0.2 (H) 0.00 - 0.04 K/UL    DF SMEAR SCANNED      RBC COMMENTS MICROCYTOSIS  1+        RBC COMMENTS HYPOCHROMIA  2+        RBC COMMENTS OVALOCYTES  PRESENT        RBC COMMENTS SCHISTOCYTES  PRESENT        RBC COMMENTS MANOHAR CELLS  PRESENT       METABOLIC PANEL, COMPREHENSIVE    Collection Time: 03/04/23  7:09 PM   Result Value Ref Range    Sodium 138 136 - 145 mmol/L    Potassium 4.5 3.5 - 5.1 mmol/L    Chloride 100 97 - 108 mmol/L    CO2 24 21 - 32 mmol/L    Anion gap 14 5 - 15 mmol/L    Glucose 348 (H) 65 - 100 mg/dL    BUN 27 (H) 6 - 20 MG/DL    Creatinine 0.96 0.55 - 1.02 MG/DL    BUN/Creatinine ratio 28 (H) 12 - 20      eGFR 59 (L) >60 ml/min/1.73m2    Calcium 10.0 8.5 - 10.1 MG/DL Bilirubin, total 0.3 0.2 - 1.0 MG/DL    ALT (SGPT) 26 12 - 78 U/L    AST (SGOT) 15 15 - 37 U/L    Alk. phosphatase 89 45 - 117 U/L    Protein, total 7.3 6.4 - 8.2 g/dL    Albumin 3.5 3.5 - 5.0 g/dL    Globulin 3.8 2.0 - 4.0 g/dL    A-G Ratio 0.9 (L) 1.1 - 2.2     COVID-19 RAPID TEST    Collection Time: 03/04/23  7:09 PM   Result Value Ref Range    Specimen source Nasopharyngeal      COVID-19 rapid test Not detected NOTD     TROPONIN-HIGH SENSITIVITY    Collection Time: 03/04/23  7:09 PM   Result Value Ref Range    Troponin-High Sensitivity 28 0 - 51 ng/L   NT-PRO BNP    Collection Time: 03/04/23  7:09 PM   Result Value Ref Range    NT pro- (H) 0 - 450 PG/ML   EKG, 12 LEAD, INITIAL    Collection Time: 03/04/23  7:10 PM   Result Value Ref Range    Ventricular Rate 94 BPM    Atrial Rate 94 BPM    P-R Interval 138 ms    QRS Duration 82 ms    Q-T Interval 362 ms    QTC Calculation (Bezet) 452 ms    Calculated P Axis 63 degrees    Calculated R Axis 1 degrees    Calculated T Axis 45 degrees    Diagnosis       Normal sinus rhythm  Normal ECG  When compared with ECG of 19-DEC-2020 13:42,  No significant change was found            IMAGING:  The Following imaging studies have been ordered while in the emergency department and I have reviewed them. XR CHEST PORT   Final Result   No Acute Disease. Medications During Visit:  I ordered/approved the ordering of the following medicines for the patient while in the emergency department. Medications - No data to display      DECISION MAKING:  Henok Ribeiro is a 81 y.o. female who comes in as above. Patient arrives as above. She is hypoxic on arrival requiring supplemental oxygen. Family notes the patient does have some oxygen at home that she uses intermittently as needed but has never been hypoxic. She was seen last week for the same complaints and since has been getting worse. She has complaints of continued shortness of breath productive cough. On exam her lungs are congested. She has a slightly elevated BNP. Unknown if this is related to early heart failure or if this is some other type of pneumonia that was not seen on x-ray such as in the lower portions of the lungs. At this time given her continuous oxygen use and her leukocytosis and hyperglycemia we will admit. The leukocytosis and hyperglycemia could be related to steroid use since she was prescribed steroids last week but cannot state it is not related to underlying infection. Will admit. Antibiotic started. IMPRESSION:  1. Hypoxia    2. Hyperglycemia    3. Leukocytosis, unspecified type    4. SOB (shortness of breath)      Total critical care time (not including time spent performing separately reportable procedures): 35 minutes    DISPOSITION:  Admitted        Procedures      Perfect Serve Consult for Admission  8:50 PM    ED Room Number: WER03/03  Patient Name and age:  Ayden Ford 81 y.o.  female  Working Diagnosis:   1. Hypoxia    2. Hyperglycemia    3. Leukocytosis, unspecified type    4. SOB (shortness of breath)        COVID-19 Suspicion:  no  Sepsis present:  no  Reassessment needed: no  Code Status:  Full Code  Readmission: no  Isolation Requirements:  no  Recommended Level of Care:  telemetry  Department: [facility] ED - [phone]      Other:  She is hypoxic on arrival requiring supplemental oxygen. Family notes the patient does have some oxygen at home that she uses intermittently as needed but has never been hypoxic. She was seen last week for the same complaints and since has been getting worse. She has complaints of continued shortness of breath productive cough. On exam her lungs are congested. She has a slightly elevated BNP. Unknown if this is related to early heart failure or if this is some other type of pneumonia that was not seen on x-ray such as in the lower portions of the lungs.   At this time given her continuous oxygen use and her leukocytosis and hyperglycemia we will admit. The leukocytosis and hyperglycemia could be related to steroid use since she was prescribed steroids last week but cannot state it is not related to underlying infection. Will admit. Antibiotic started. Total critical care time spent exclusive of procedures:  35 minutes.

## 2023-03-05 NOTE — PROGRESS NOTES
3/5/2023   CARE MANAGEMENT NOTE:  (weekend coverage). Reason for Admission:  Acute respiratory failure with hypoxia                     RUR Score:   12%; Low risk                  Plan for utilizing home health:    TBD (PT/OT evals are pending    PCP: First and Last name:  Dr. Maria A Villegas     Name of Practice:    Are you a current patient: Yes/No:    Approximate date of last visit:    Can you participate in a virtual visit with your PCP:                     Current Advanced Directive/Advance Care Plan: Full Code      Healthcare Decision Maker:   Click here to complete [address] including selection of the Healthcare Decision Maker Relationship (ie \"Primary\")  Dtr Nolan Rankin (cell [phone])  Dtr Pushpa Love ([phone]    Initial Assessment:  CM met with pt who was her own historian for this needs assessment. Information was later verified with her dtr Alicja Cervantes). Reportedly, pt resides with her dtr Nolan Rankin and her NERIS in a one story home with 4 entry steps. CM confirmed [place] address. PTA, pt was ambulatory without any assistive device, she requires assistance with ADLs, but does not drive. She has [employer] and obtains medications from "[employer] at [facility] located at [facility]. She does not have any home health care currently. DME in the home includes a w/c that she uses for summer beach trips, oxygen from unknown provider, and a shower chair. PCP is Dr. Maria A Villegas. Transition of Care Plan:   Plan is for pt to return home with her dtr Nolan Annjackelyn and NERIS    PT/OT evals pending; await recs  Home oxygen eval as appropriate (unknown liter flow at home)  Outpatient follow   Dtr Tello Horn) will transport pt home with portable oxygen tank. CM will continue to follow pt until discharged.   Jessica

## 2023-03-05 NOTE — PROGRESS NOTES
Bedside shift change report given to Heena Cardenas (oncoming nurse) by Baudilio Neal (offgoing nurse). Report included the following information SBAR, Kardex, Intake/Output, MAR, and Recent Results.

## 2023-03-05 NOTE — ED NOTES
Report called to Veto Hall RN at [facility] 5th floor

## 2023-03-05 NOTE — PROGRESS NOTES
[facility] Dosing Services: 03/05/23    Pharmacist Renal Dosing Progress Note for [facility]     Physician Dr. Montengero Och    The following medication: Levofloxacin was automatically dose-adjusted per [facility] P&T Committee Protocol, with respect to renal function. Consult provided for this   81 y.o. , female , for the indication of Pneumonia. Pt Weight:   Wt Readings from Last 1 Encounters:   03/04/23 81.2 kg (179 lb)         Previous Regimen Levofloxacin 750 mg IV every 24 hours   Serum Creatinine Lab Results   Component Value Date/Time    Creatinine 0.96 03/04/2023 07:09 PM       Creatinine Clearance Estimated Creatinine Clearance: 45.3 mL/min (based on SCr of 0.96 mg/dL). BUN Lab Results   Component Value Date/Time    BUN 27 (H) 03/04/2023 07:09 PM       Dosage changed to:  Levofloxacin 750 mg IV every 48 hours      Pharmacy to continue to monitor patient daily. Will make dosage adjustments based upon changing renal function.   Signed Ward Galan information:  [phone]

## 2023-03-05 NOTE — PROGRESS NOTES
TRANSFER - IN REPORT:    Verbal report received from [facility] (name) on Vinay Canela  being received from [facility] ED(unit) for routine progression of care      Report consisted of patients Situation, Background, Assessment and   Recommendations(SBAR). Information from the following report(s) SBAR, Kardex, ED Summary, Intake/Output, MAR, and Recent Results was reviewed with the receiving nurse. Opportunity for questions and clarification was provided. Assessment completed upon patients arrival to unit and care assumed.

## 2023-03-06 VITALS
OXYGEN SATURATION: 90 % | WEIGHT: 179 LBS | TEMPERATURE: 98.7 F | BODY MASS INDEX: 32.94 KG/M2 | SYSTOLIC BLOOD PRESSURE: 122 MMHG | DIASTOLIC BLOOD PRESSURE: 85 MMHG | RESPIRATION RATE: 18 BRPM | HEIGHT: 62 IN | HEART RATE: 89 BPM

## 2023-03-06 LAB
ALBUMIN SERPL-MCNC: 3.1 G/DL (ref 3.5–5)
ALBUMIN/GLOB SERPL: 0.8 (ref 1.1–2.2)
ALP SERPL-CCNC: 85 U/L (ref 45–117)
ALT SERPL-CCNC: 23 U/L (ref 12–78)
ANION GAP SERPL CALC-SCNC: 4 MMOL/L (ref 5–15)
AST SERPL-CCNC: 12 U/L (ref 15–37)
ATRIAL RATE: 94 BPM
BASOPHILS # BLD: 0.1 K/UL (ref 0–0.1)
BASOPHILS NFR BLD: 1 % (ref 0–1)
BILIRUB SERPL-MCNC: 0.5 MG/DL (ref 0.2–1)
BUN SERPL-MCNC: 28 MG/DL (ref 6–20)
BUN/CREAT SERPL: 33 (ref 12–20)
CALCIUM SERPL-MCNC: 9.8 MG/DL (ref 8.5–10.1)
CALCULATED P AXIS, ECG09: 63 DEGREES
CALCULATED R AXIS, ECG10: 1 DEGREES
CALCULATED T AXIS, ECG11: 45 DEGREES
CHLORIDE SERPL-SCNC: 105 MMOL/L (ref 97–108)
CO2 SERPL-SCNC: 29 MMOL/L (ref 21–32)
CREAT SERPL-MCNC: 0.86 MG/DL (ref 0.55–1.02)
DIAGNOSIS, 93000: NORMAL
DIFFERENTIAL METHOD BLD: ABNORMAL
EOSINOPHIL # BLD: 0.2 K/UL (ref 0–0.4)
EOSINOPHIL NFR BLD: 1 % (ref 0–7)
ERYTHROCYTE [DISTWIDTH] IN BLOOD BY AUTOMATED COUNT: 15.9 % (ref 11.5–14.5)
GLOBULIN SER CALC-MCNC: 4.1 G/DL (ref 2–4)
GLUCOSE BLD STRIP.AUTO-MCNC: 210 MG/DL (ref 65–117)
GLUCOSE BLD STRIP.AUTO-MCNC: 352 MG/DL (ref 65–117)
GLUCOSE SERPL-MCNC: 222 MG/DL (ref 65–100)
HCT VFR BLD AUTO: 34.5 % (ref 35–47)
HGB BLD-MCNC: 10.2 G/DL (ref 11.5–16)
IMM GRANULOCYTES # BLD AUTO: 0.1 K/UL (ref 0–0.04)
IMM GRANULOCYTES NFR BLD AUTO: 1 % (ref 0–0.5)
LYMPHOCYTES # BLD: 4 K/UL (ref 0.8–3.5)
LYMPHOCYTES NFR BLD: 27 % (ref 12–49)
MCH RBC QN AUTO: 21.3 PG (ref 26–34)
MCHC RBC AUTO-ENTMCNC: 29.6 G/DL (ref 30–36.5)
MCV RBC AUTO: 72.2 FL (ref 80–99)
MONOCYTES # BLD: 1.5 K/UL (ref 0–1)
MONOCYTES NFR BLD: 10 % (ref 5–13)
NEUTS SEG # BLD: 9.2 K/UL (ref 1.8–8)
NEUTS SEG NFR BLD: 60 % (ref 32–75)
NRBC # BLD: 0.02 K/UL (ref 0–0.01)
NRBC BLD-RTO: 0.1 PER 100 WBC
P-R INTERVAL, ECG05: 138 MS
PERIPHERAL SMEAR,PSM: NORMAL
PLATELET # BLD AUTO: 528 K/UL (ref 150–400)
PMV BLD AUTO: 9.9 FL (ref 8.9–12.9)
POTASSIUM SERPL-SCNC: 3.6 MMOL/L (ref 3.5–5.1)
PROT SERPL-MCNC: 7.2 G/DL (ref 6.4–8.2)
Q-T INTERVAL, ECG07: 362 MS
QRS DURATION, ECG06: 82 MS
QTC CALCULATION (BEZET), ECG08: 452 MS
RBC # BLD AUTO: 4.78 M/UL (ref 3.8–5.2)
SERVICE CMNT-IMP: ABNORMAL
SERVICE CMNT-IMP: ABNORMAL
SODIUM SERPL-SCNC: 138 MMOL/L (ref 136–145)
VENTRICULAR RATE, ECG03: 94 BPM
WBC # BLD AUTO: 15 K/UL (ref 3.6–11)

## 2023-03-06 PROCEDURE — 94761 N-INVAS EAR/PLS OXIMETRY MLT: CPT

## 2023-03-06 PROCEDURE — 74011250636 HC RX REV CODE- 250/636: Performed by: INTERNAL MEDICINE

## 2023-03-06 PROCEDURE — 97535 SELF CARE MNGMENT TRAINING: CPT

## 2023-03-06 PROCEDURE — 97116 GAIT TRAINING THERAPY: CPT

## 2023-03-06 PROCEDURE — 80053 COMPREHEN METABOLIC PANEL: CPT

## 2023-03-06 PROCEDURE — 94640 AIRWAY INHALATION TREATMENT: CPT

## 2023-03-06 PROCEDURE — 82962 GLUCOSE BLOOD TEST: CPT

## 2023-03-06 PROCEDURE — 74011250637 HC RX REV CODE- 250/637: Performed by: INTERNAL MEDICINE

## 2023-03-06 PROCEDURE — 36415 COLL VENOUS BLD VENIPUNCTURE: CPT

## 2023-03-06 PROCEDURE — 97162 PT EVAL MOD COMPLEX 30 MIN: CPT

## 2023-03-06 PROCEDURE — 74011000250 HC RX REV CODE- 250: Performed by: INTERNAL MEDICINE

## 2023-03-06 PROCEDURE — 74011636637 HC RX REV CODE- 636/637: Performed by: INTERNAL MEDICINE

## 2023-03-06 PROCEDURE — 85025 COMPLETE CBC W/AUTO DIFF WBC: CPT

## 2023-03-06 PROCEDURE — 99233 SBSQ HOSP IP/OBS HIGH 50: CPT

## 2023-03-06 PROCEDURE — 97165 OT EVAL LOW COMPLEX 30 MIN: CPT

## 2023-03-06 PROCEDURE — 94664 DEMO&/EVAL PT USE INHALER: CPT

## 2023-03-06 PROCEDURE — 96372 THER/PROPH/DIAG INJ SC/IM: CPT

## 2023-03-06 PROCEDURE — 77010033678 HC OXYGEN DAILY

## 2023-03-06 PROCEDURE — G0378 HOSPITAL OBSERVATION PER HR: HCPCS

## 2023-03-06 RX ORDER — DOXYCYCLINE 100 MG/1
100 CAPSULE ORAL 2 TIMES DAILY
Qty: 14 CAPSULE | Refills: 0 | Status: SHIPPED | OUTPATIENT
Start: 2023-03-06 | End: 2023-03-13

## 2023-03-06 RX ORDER — INSULIN GLARGINE 100 [IU]/ML
0.3 INJECTION, SOLUTION SUBCUTANEOUS DAILY
Status: DISCONTINUED | OUTPATIENT
Start: 2023-03-07 | End: 2023-03-06 | Stop reason: HOSPADM

## 2023-03-06 RX ORDER — INSULIN GLARGINE 100 [IU]/ML
8 INJECTION, SOLUTION SUBCUTANEOUS
Status: DISCONTINUED | OUTPATIENT
Start: 2023-03-06 | End: 2023-03-06

## 2023-03-06 RX ADMIN — INSULIN GLARGINE 16 UNITS: 100 INJECTION, SOLUTION SUBCUTANEOUS at 09:18

## 2023-03-06 RX ADMIN — ESCITALOPRAM OXALATE 20 MG: 10 TABLET ORAL at 09:19

## 2023-03-06 RX ADMIN — ENOXAPARIN SODIUM 40 MG: 100 INJECTION SUBCUTANEOUS at 09:18

## 2023-03-06 RX ADMIN — Medication 10 ML: at 05:24

## 2023-03-06 RX ADMIN — ARFORMOTEROL TARTRATE 15 MCG: 15 SOLUTION RESPIRATORY (INHALATION) at 07:15

## 2023-03-06 RX ADMIN — ATORVASTATIN CALCIUM 40 MG: 20 TABLET, FILM COATED ORAL at 09:18

## 2023-03-06 RX ADMIN — Medication 4 UNITS: at 09:18

## 2023-03-06 RX ADMIN — Medication 12 UNITS: at 11:30

## 2023-03-06 NOTE — PROGRESS NOTES
3/6/2023  Case Management Progress Note    2:26 PM  Noted discharge order. PT saw patient and recommended HH--sent to [facility] as patient has had them before. Await response. MORA Vance    10:03 AM  Patient is 81year old female admitted 3/4 with acute respiratory failure  Patient's RUR is 12% green/low risk for readmission  Covid test: negative 3/4  Chart reviewed  Per chart patient is improved and pending consults yet. At baseline patient needs some assistance with ADLs provided by family. At time of assessment, CM was not sure how much oxygen she was on at baseline however per chart review, looks like 2L. She has had [facility] in the past as well. Will continue to follow and assist with discharge planning as clinical course continues and needs arise/become more clear.      Transition of Care Plan   Continue medical management/treatment  Home with family assistance when ready   [facility] if needed, pending PT and OT  Home O2 2L at home, may need updated  CM will continue to follow    MORA Vance

## 2023-03-06 NOTE — PROGRESS NOTES
Problem: Mobility Impaired (Adult and Pediatric)  Goal: *Acute Goals and Plan of Care (Insert Text)  Description: FUNCTIONAL STATUS PRIOR TO ADMISSION: Patient was modified independent using a SB quad cane for functional mobility. Family assists with ADL's. HOME SUPPORT PRIOR TO ADMISSION: The patient lived with dtr and required minimal assistance/contact guard assist for ADL's. Physical Therapy Goals  Initiated 3/6/2023  1. Patient will move from supine to sit and sit to supine  in bed with modified independence within 7 day(s). 2.  Patient will transfer from bed to chair and chair to bed with supervision/set-up using the least restrictive device within 7 day(s). 3.  Patient will perform sit to stand with supervision/set-up within 7 day(s). 4.  Patient will ambulate with supervision/set-up for 150 feet with the least restrictive device within 7 day(s). 5.  Patient will ascend/descend 2 stairs with  handrail(s) with minimal assistance/contact guard assist within 7 day(s). Outcome: Progressing Towards Goal   PHYSICAL THERAPY EVALUATION  Patient: Britney Houston (65 y.o. female)  Date: 3/6/2023  Primary Diagnosis: Acute respiratory failure with hypoxia (HCC) [J96.01]       Precautions:        ASSESSMENT  Based on the objective data described below, the patient presents with admission due to SOB, hypoxia, productive cough for 1wk. Chart stating URI/PNA likely. Pt received supine in bed with n/c out of nose. O2 sats 91-92% on room air. Pt alert and cooperative. Supine to sit with Min A.  Good sitting balance. Sit to stand with same. Gait of 50' tolerated well. Pt able to statically stand without support of RW safely. G-dtr stating pt walks at home without a.d. sometimes or uses cane. Pt placed in recliner in NAD. HHPT recommended to achieve functional status PTA. Current Level of Function Impacting Discharge (mobility/balance): Min A/good with RW    Functional Outcome Measure:   The patient scored 18/24 on the [facility] outcome measure which is indicative of needing HHPT vs rehab. Douglas Benedict AM-PAC®      Basic Mobility Inpatient Short Form (6-Clicks) Version 2  How much HELP from another person do you currently need. .. (If the patient hasn't done an activity recently, how much help from another person do you think they would need if they tried?) Total A Lot A Little None   1. Turning from your back to your side while in a flat bed without using bedrails? []  1 []  2 [x]  3  []  4   2. Moving from lying on your back to sitting on the side of a flat bed without using bedrails? []  1 []  2 [x]  3  []  4   3. Moving to and from a bed to a chair (including a wheelchair)? []  1 []  2 [x]  3  []  4   4. Standing up from a chair using your arms (e.g. wheelchair or bedside chair)? []  1 []  2 [x]  3  []  4   5. Walking in hospital room? []  1 []  2 [x]  3  []  4   6. Climbing 3-5 steps with a railing? []  1 []  2 [x]  3  []  4     Raw Score: 18/24                            Cutoff score ?171,2,3 had higher odds of discharging home with home health or need of SNF/IPR. [address] [facility], Amber Ponce [facility], Helena Michel [facility] Jordan [place]ofer[place]reket. Validity of the AM-PAC 6-Clicks Inpatient Daily Activity and Basic Mobility Short Forms. Physical Therapy Mar 2014, 94 (3) 379-391; DOI: 10.2522/ptj.66525160  2. Xander Mckinley. Association of AM-PAC \"6-Clicks\" Basic Mobility and Daily Activity Scores With Discharge Destination. Phys Ther. 2021 Apr 4;101(4):kqcg456. doi: 10.1093/ptj/etor062. PMID: 67439306. V Flori Lao, Shawn ROSA, Bipin Ann K, Luiz S. Activity Measure for Post-Acute Care \"6-Clicks\" Basic Mobility Scores Predict Discharge Destination After Acute Care Hospitalization in Select Patient Groups: A Retrospective, Observational Study. Arch Rehabil Res Clin Transl. 2022 Jul 16;4(3):038521. doi: 10.1016/j.arrct. 8935.466758. PMID: 01446944; PMCID: CBN3966393. 4. Luisa Gonzalez Ni P. AM-PAC Short Forms Manual 4.0. Revised 2/2020. Other factors to consider for discharge: per above     Patient will benefit from skilled therapy intervention to address the above noted impairments. PLAN :  Recommendations and Planned Interventions: bed mobility training, transfer training, gait training, therapeutic exercises, neuromuscular re-education, edema management/control, patient and family training/education, and therapeutic activities      Frequency/Duration: Patient will be followed by physical therapy:  5 times a week to address goals. Recommendation for discharge: (in order for the patient to meet his/her long term goals)  Physical therapy at least 2 days/week in the home     This discharge recommendation:  Has been made in collaboration with the attending provider and/or case management    IF patient discharges home will need the following DME: has DME and Home O2         SUBJECTIVE:   Patient stated You are getting on my nerves. (said in jest)    OBJECTIVE DATA SUMMARY:   HISTORY:    Past Medical History:   Diagnosis Date    [facility]     Hypercholesterolemia     Hyperglycemia due to type 2 diabetes mellitus ([facility] 08/04/2016    Major depression     S/P PICC central line placement 58/11/3151    Right basilic PICC placement for LT antbx    Transient ischemic attack      Past Surgical History:   Procedure Laterality Date    COLONOSCOPY N/A 06/24/2016    COLONOSCOPY performed by Melissa Vegas MD at [facility] N/A 08/26/2016    SIGMOIDOSCOPY FLEXIBLE performed by Melissa Vegas MD at [address] [place] [address] GI ENDOSCOPY,DIAGNOSIS  08/26/2016            Personal factors and/or comorbidities impacting plan of care: per  above and below    Home Situation  Home Environment: Private residence  # Steps to Enter: 2  Rails to Enter: Yes  Hand Rails : Bilateral  One/Two Story Residence: One story  Living Alone: No  Support Systems: Child(katie), Other Family Member(s)  Patient Expects to be Discharged to[de-identified] Home with home health  Current DME Used/Available at Home: Oxygen, portable, Shower chair, Walker, rolling, Cane, straight, Grab bars, Wheelchair  Tub or Shower Type: Shower    EXAMINATION/PRESENTATION/DECISION MAKING:   Critical Behavior:  Neurologic State: Alert  Orientation Level: Oriented to person, Oriented to place, Oriented to time  Cognition: Follows commands     Hearing: Auditory  Auditory Impairment: None, Hard of hearing, bilateral  Skin:  IV; 2LO2  Edema: abdominal distention  Range Of Motion:  AROM: Within functional limits                       Strength:    Strength: Generally decreased, functional                    Tone & Sensation:   Tone: Normal                              Coordination:  Coordination: Within functional limits  Vision:      Functional Mobility:  Bed Mobility:  Rolling: Contact guard assistance  Supine to Sit: Minimum assistance     Scooting: Stand-by assistance  Transfers:  Sit to Stand: Contact guard assistance  Stand to Sit: Contact guard assistance                       Balance:   Sitting: Intact; High guard  Standing: Impaired  Standing - Static: Constant support;Good  Standing - Dynamic : Constant support; Fair  Ambulation/Gait Training:  Distance (ft): 50 Feet (ft)  Assistive Device: Walker, rolling;Gait belt (2LO2)  Ambulation - Level of Assistance: Minimal assistance        Gait Abnormalities: Decreased step clearance        Base of Support: Widened     Speed/Chelsea: Slow;Pace decreased (<100 feet/min)  Step Length: Right shortened;Left shortened                    Functional Measure:  See [facility] above       Physical Therapy Evaluation Charge Determination   History Examination Presentation Decision-Making   MEDIUM  Complexity : 1-2 comorbidities / personal factors will impact the outcome/ POC  MEDIUM Complexity : 3 Standardized tests and measures addressing body structure, function, activity limitation and / or participation in recreation  MEDIUM Complexity : Evolving with changing characteristics  Other outcome measures AMPAC  LOW       Based on the above components, the patient evaluation is determined to be of the following complexity level: LOW     Pain Rating:  none    Activity Tolerance:   Good    After treatment patient left in no apparent distress:   Sitting in chair, Heels elevated for pressure relief, Call bell within reach, Bed / chair alarm activated, and Caregiver / family present    COMMUNICATION/EDUCATION:   The patients plan of care was discussed with: Occupational therapist, Registered nurse, and Case management. Fall prevention education was provided and the patient/caregiver indicated understanding., Patient/family have participated as able in goal setting and plan of care. , and Patient/family agree to work toward stated goals and plan of care.     Thank you for this referral.  Marcial Garcia, PT   Time Calculation: 23 mins

## 2023-03-06 NOTE — PROGRESS NOTES
The pt is discharged to home with granddaughter as transport. Discharge instructions were provided along with the opportunity for questions and concerns. The pt and granddaughter verbalized understanding and volunteer service is on the way to take her to the front for home.

## 2023-03-06 NOTE — DISCHARGE SUMMARY
Hospitalist Discharge Summary     Patient ID:  Donya Pascual  [id number]  19 y.o.  1940    Admit date: 3/4/2023    Discharge date and time: 3/6/2023    Admission Diagnoses: Acute respiratory failure with hypoxia [facility]) [J96.01]      Discharge Diagnoses: Active Problems:    Acute respiratory failure with hypoxia ([address] (3/4/2023)         Acute on chronic hypoxic respiratory failure  Acute bronchitis  Leukocytosis, likely due to recent steroids  Mild lymphocytosis and Monocytosis  Diabetes mellitus, uncontrolled. OLIVE/OHS  Dementia      Hospital Course:     Donya Pascual  is a 81 y.o.  female with history of obesity, diabetes, and dementia presenting with acute dyspnea and acute on chronic hypoxic respiratory failure. The patient was on 2 L via nasal cannula at home. The patient was admitted to the hospitalist service. She had a cough productive of yellow sputum. However, chest x-ray showed no consolidation and procalcitonin level was negative. Antibiotics were initially started and then were discontinued. Her condition improved with diuresis. An echocardiogram was conducted which showed grade 1 diastolic dysfunction with mild aortic stenosis. Patient's respiratory status improved and she was stable on 2 L. Her diabetes was treated with basal glargine and sliding scale while she was inpatient. She plans to resume her home glipizide and metformin on discharge. Given her advanced age, this is reasonable, however if tighter control is desired she should discuss initiating insulin with her primary care physician. Patient continued to have cough productive of yellow sputum. Therefore empiric course of doxycycline will be prescribed for bronchitis. She had leukocytosis with mild lymphocytosis and monocytosis. Is most likely due to recent steroids however may be indicative of a viral infection. The above antibiotic course should treat/prevent secondary infection.   She was discharged in stable and improved condition. * * *FINAL PATHOLOGIC DIAGNOSIS* * *     Peripheral blood, smear:        RBC's:          Mild microcytic hypochromic anemia with mild   anisopoikilocytosis. WBC's:     Leukocytosis with mild neutrophilia, lymphocytosis and   monocytosis. Platelets:     Mild thrombocytosis. [facility] [place]/3/6/2023   *Electronically Signed Out By Tyra Quan M.D.*         Echocardiogram 3/5/23  Result status: Final result       Left Ventricle: Normal left ventricular systolic function with a visually estimated EF of 55 - 60%. Left ventricle size is normal. Mildly increased wall thickness. Findings consistent with mild concentric hypertrophy. Normal wall motion. Grade I diastolic dysfunction with normal LAP. Aortic Valve: Not well visualized. Mild sclerosis of the aortic valve cusp. Mild stenosis of the aortic valve. Technical qualifiers: Echo study was technically difficult with poor endocardial visualization. PCP: Other, Phys, MD     Consults: None    Condition of patient at discharge: improved    Discharge Exam:     Visit Vitals  /85 (BP 1 Location: Left upper arm, BP Patient Position: Sitting)   Pulse 89   Temp 98.7 °F (37.1 °C)   Resp 18   Ht 5' 2\" (1.575 m)   Wt 81.2 kg (179 lb)   SpO2 90%   BMI 32.74 kg/m²        General:   No acute distress, resting comfortably in bed. Heart:  RRR, No MRG  Lungs:  Rhonchi. Wet cough. Abdomen:  Soft . Nondistended. NTTP. BS present. Extremities:  No edema. Neuro:  Alert and interactive. Dementia. No focal deficits. Disposition: home    Current Discharge Medication List        START taking these medications    Details   doxycycline (MONODOX) 100 mg capsule Take 1 Capsule by mouth two (2) times a day for 7 days.   Qty: 14 Capsule, Refills: 0  Start date: 3/6/2023, End date: 3/13/2023           CONTINUE these medications which have NOT CHANGED    Details   benzonatate (Tessalon Perles) 100 mg capsule Take 100 mg by mouth three (3) times daily as needed for Cough. metFORMIN (GLUCOPHAGE) 500 mg tablet Take 500 mg by mouth two (2) times daily (with meals). atorvastatin (LIPITOR) 40 mg tablet Take 40 mg by mouth daily. escitalopram oxalate (LEXAPRO) 20 mg tablet Take 20 mg by mouth daily. STOP taking these medications       predniSONE (DELTASONE) 20 mg tablet Comments:   Reason for Stopping:                I have reviewed discharge instructions with the patient and caregiver. The patient and caregiver verbalized understanding.      Approximate time spent in patient care on day of discharge: 30 mins    Signed:  Gen Sánchez MD  3/6/2023  2:17 PM

## 2023-03-06 NOTE — PROGRESS NOTES
Problem: Self Care Deficits Care Plan (Adult)  Goal: *Acute Goals and Plan of Care (Insert Text)  Description: FUNCTIONAL STATUS PRIOR TO ADMISSION: Patient was modified independent using a SB quad cane for functional mobility. FAmily assists with ADLs. HOME SUPPORT: The patient lived with daughter but required min assist.    Occupational Therapy Goals  Initiated 3/6/2023  1. Patient will perform lower body dressing with supervision/set-up within 7 day(s). 2.  Patient will perform grooming, standing at sink,  with supervision/set-up within 7 day(s). 4.  Patient will perform toilet transfers with supervision/set-up within 7 day(s). 5.  Patient will perform all aspects of toileting with supervision/set-up within 7 day(s). 6.  Patient will participate in upper extremity therapeutic exercise/activities with supervision/set-up for 10 minutes within 7 day(s). Outcome: Progressing Towards Goal   OCCUPATIONAL THERAPY EVALUATION  Patient: Antionette Quintero (82 y.o. female)  Date: 3/6/2023  Primary Diagnosis: Acute respiratory failure with hypoxia (HCC) [J96.01]       Precautions: fall       ASSESSMENT  Based on the objective data described below, the patient presents with hospital admission secondary to acute respiratory failure. Patient received in bed, granddaughter present and assists with answering questions due to patient's TRACE [facility]. Patient agreeable to activity, and reports wearing 2L at home. Patient O2 sats, reading 90% on room air as patient with cannula over nose. Patient requires CGA to min assist for ADL tasks. Patient with productive cough. Patient with very supportive family and members rotate assisting patient. Patient to benefit from OT services. Current Level of Function Impacting Discharge (ADLs/self-care): CGA- Min A    Functional Outcome Measure: The patient scored 21/24 on the AM PAC outcome measure.       Other factors to consider for discharge: lives with family     Patient will benefit from skilled therapy intervention to address the above noted impairments. PLAN :  Recommendations and Planned Interventions: self care training, functional mobility training, therapeutic exercise, balance training, therapeutic activities, endurance activities, patient education, home safety training, and family training/education    Frequency/Duration: Patient will be followed by occupational therapy 5 times a week to address goals.     Recommendation for discharge: (in order for the patient to meet his/her long term goals)  Occupational therapy at least 2 days/week in the home     This discharge recommendation:  Has been made in collaboration with the attending provider and/or case management    IF patient discharges home will need the following DME:        SUBJECTIVE:   Patient stated german De.    OBJECTIVE DATA SUMMARY:   HISTORY:   Past Medical History:   Diagnosis Date    Alzheimer's disease ([facility]     Hypercholesterolemia     Hyperglycemia due to type 2 diabetes mellitus ([facility] 08/04/2016    Major depression     S/P PICC central line placement 70/44/9416    Right basilic PICC placement for LT antbx    Transient ischemic attack      Past Surgical History:   Procedure Laterality Date    COLONOSCOPY N/A 06/24/2016    COLONOSCOPY performed by Patricia Mcclain MD at [address] [place] [address] N/A 08/26/2016    SIGMOIDOSCOPY FLEXIBLE performed by Patricia Mcclain MD at [facility] ENDOSCOPY,DIAGNOSIS  08/26/2016            Expanded or extensive additional review of patient history:     Home Situation  Home Environment: Private residence  # Steps to Enter: 2  Rails to Enter: Yes  Hand Rails : Bilateral  One/Two Story Residence: One story  Living Alone: No  Support Systems: Child(katie), Other Family Member(s)  Patient Expects to be Discharged to[de-identified] Home with home health  Current DME Used/Available at Home: Oxygen, portable, Shower chair, Walker, rolling, Cane, straight, Grab bars, Wheelchair  Tub or Shower Type: Shower    Hand dominance: Right    EXAMINATION OF PERFORMANCE DEFICITS:  Cognitive/Behavioral Status:  Neurologic State: Alert  Orientation Level: Oriented to person;Oriented to place;Oriented to time  Cognition: Follows commands  Perception: Appears intact  Perseveration: No perseveration noted  Safety/Judgement: Awareness of environment    Skin: intact as seen    Edema: none noted     Hearing: Auditory  Auditory Impairment: None, Hard of hearing, bilateral    Vision/Perceptual:                                     Range of Motion:  AROM: Within functional limits                         Strength:  Strength: Generally decreased, functional                Coordination:  Coordination: Within functional limits  Fine Motor Skills-Upper: Left Intact; Right Intact    Gross Motor Skills-Upper: Left Intact; Right Intact    Tone & Sensation:    Tone: Normal                         Balance:  Sitting: Intact; High guard  Standing: Impaired  Standing - Static: Constant support;Good  Standing - Dynamic : Constant support; Fair    Functional Mobility and Transfers for ADLs:  Bed Mobility:  Rolling: Contact guard assistance  Supine to Sit: Minimum assistance  Scooting: Stand-by assistance    Transfers:  Sit to Stand: Contact guard assistance  Stand to Sit: Contact guard assistance  Bed to Chair: Contact guard assistance  Toilet Transfer : Contact guard assistance  Assistive Device : Walker, rolling    ADL Assessment:  Feeding: Independent    Oral Facial Hygiene/Grooming: Contact guard assistance    Bathing: Minimum assistance         Upper Body Dressing: Contact guard assistance    Lower Body Dressing: Minimum assistance    Toileting: Contact guard assistance                  ADL Intervention and task modifications:                                          Cognitive Retraining  Safety/Judgement: Awareness of environment      Therapeutic Exercise:     Functional Measure:  [facility] AM-PAC®      Daily Activity Inpatient Short Form (6-Clicks) Version 2  How much HELP from another person do you currently need. .. (If the patient hasn't done an activity recently, how much help from another person do you think they would need if they tried?) Total A Lot A Little None   1. Putting on and taking off regular lower body clothing? []   1 []   2 [x]   3 []   4   2. Bathing (including washing, rinsing, drying)? []   1 []   2 [x]   3 []   4   3. Toileting, which includes using toilet, bedpan, or urinal? []   1 []   2 [x]   3 []   4   4. Putting on and taking off regular upper body clothing? []   1 []   2 []   3 [x]   4   5. Taking care of personal grooming such as brushing teeth? []   1 []   2 []   3 [x]   4   6. Eating meals? []   1 []   2 []   3 [x]   4     Raw Score: 21/24                            Cutoff score ?191,2,3 had higher odds of discharging home with home health or need of SNF/IPR    1. Oswaldo Carrillo. Validity of the AM-PAC 6-Clicks Inpatient Daily Activity and Basic Mobility Short Forms. Physical Therapy Mar 2014, 94 (3) 379-391; DOI: 10.2522/ptj.63445231  2. Pamela Arana. Association of AM-PAC \"6-Clicks\" Basic Mobility and Daily Activity Scores With Discharge Destination. Phys Ther. 2021 Apr 4;101(4):yacu129. doi: 10.1093/ptj/vnsy508. PMID: 55059794. V Flori Lao, Shawn D, Gael Desir, Bipin K, Luiz S. Activity Measure for Post-Acute Care \"6-Clicks\" Basic Mobility Scores Predict Discharge Destination After Acute Care Hospitalization in Select Patient Groups: A Retrospective, Observational Study. Arch Rehabil Res Clin Transl. 2022 Jul 16;4(3):047336. doi: 10.1016/j.arrct. 2438.116426. PMID: 39234753; PMCID: AFR1922262. 4. Luisa Eduardo W, Flower CEBALLOS. AM-PAC Short Forms Manual 4.0. Revised 2/2020.       Occupational Therapy Evaluation Charge Determination   History Examination Decision-Making   LOW Complexity : Brief history review  LOW Complexity : 1-3 performance deficits relating to physical, cognitive , or psychosocial skils that result in activity limitations and / or participation restrictions  LOW Complexity : No comorbidities that affect functional and no verbal or physical assistance needed to complete eval tasks       Based on the above components, the patient evaluation is determined to be of the following complexity level: LOW   Pain Rating:  Does not report    Activity Tolerance:   requires rest breaks    After treatment patient left in no apparent distress:    Sitting in chair, Call bell within reach, Bed / chair alarm activated, and Caregiver / family present    COMMUNICATION/EDUCATION:   The patients plan of care was discussed with: Physical therapist and Registered nurse. Home safety education was provided and the patient/caregiver indicated understanding., Patient/family have participated as able in goal setting and plan of care. , and Patient/family agree to work toward stated goals and plan of care. This patients plan of care is appropriate for delegation to [facility].     Thank you for this referral.  Paula Carranza OTR/L  Time Calculation: 25 mins

## 2023-03-06 NOTE — DISCHARGE INSTRUCTIONS
HOSPITALIST DISCHARGE INSTRUCTIONS  NAME: Tamera Duane   :  1940   MRN:  [id number]     Date/Time:  3/6/2023 2:15 PM    ADMIT DATE: 3/4/2023     DISCHARGE DATE: 3/6/2023     ADMITTING DIAGNOSIS:  Acute on chronic hypoxic respiratory failure  Acute bronchitis  Leukocytosis    DISCHARGE DIAGNOSIS:  You were admitted for evaluation and treatment of the above. MEDICATIONS:    It is important that you take the medication exactly as they are prescribed. Keep your medication in the bottles provided by the pharmacist and keep a list of the medication names, dosages, and times to be taken in your wallet. Do not take other medications without consulting your doctor. DIET:  Diabetic    ACTIVITY:  as tolerated    OTHER INSTRUCTIONS:    Follow up with PCP in one week. Seek medical attention for worsening cough, shortness of breath, or fever. If you experience any of the following symptoms then please call your primary care physician or return to the emergency room if you cannot get hold of your doctor:  Fever, chills, nausea, vomiting, diarrhea, change in mentation, falling, bleeding, shortness of breath    Follow Up:  Please call and set up an appointment to see them in 1 week. Other, MD Scott  Patient can only remember the practice name and not the physician              Information obtained by :  I understand that if any problems occur once I am at home I am to contact my physician. I understand and acknowledge receipt of the instructions indicated above.                                                                                                                                            Physician's or R.N.'s Signature                                                                  Date/Time                                                                                                                                              Patient or Representative Signature Date/Time                                                                                                                                              Patient or Representative Signature                                                          Date/Time      Signed By: Penny Landers MD     March 6, 2023

## 2023-03-06 NOTE — PROGRESS NOTES
Bedside shift change report given to [address] Arnal[address] Cecilio (oncoming nurse) by Africa Cummings RN (offgoing nurse). Report included the following information SBAR, Kardex, MAR, and Recent Results.

## 2023-03-06 NOTE — PROGRESS NOTES
Hospital Follow-up with Dr. Esperanza Villarreal on 3/17/2023 at 1:00 pm. Please arrive at 12:45 pm.  Vera [facility] CMS

## 2023-03-06 NOTE — DIABETES MGMT
[facility]  DIABETES MANAGEMENT CONSULT    Consulted by Provider for advanced nursing evaluation and care for inpatient blood glucose management. Evaluation and Action Plan   This 81 y.o. female with Type 2 diabetes is admitted with worsening cough/congestion and SOB. Patient's daughter (patient lives with her) states patient has recently been on prednisone for what was thought to be upper respiratory issue. Last dose of steroid was day before she came to hospital. She does admit to a high CHO/sugar diet as well. A1c is 10%. Daughter states that BGs are \"never low\", but daughter will sometimes hold her Metformin and Glipizide in the evening if pre-dinner is less than 160, for fear that patient's BG will go too low overnight. Daughter also states \"she's 81 y.o., so I try to compromise a little with the food'. Patient is not currently on steroids. BGs continue to be high. She was started on a 0.2 unit/kg/D basal dose yesterday and also required 27 units of correctional yesterday.  this am. Patient eating small amounts so far, but will likely also need mealtime insulin when eating better. Will give additional 8 units Lantus now to get her to a 0.3 unit/kg/D basal dose and re-evaluate for mealtime insulin tomorrow. 5 - spoke to Dr. Regina Ladd, who plans on discharging patient today. Agree with just sending her home on her PTA meds. Patient is 81 y.o. and insulin would not be recommended.     Management Rationale Action Plan   Medication   Basal needs Using 0.3 units/kg/D based on weight  24 units Lantus daily   Nutritional needs Using normal sensitivity  4 units Humalog with breakfast and dinner   Corrective insulin Using insulin resistant sensitivity based on weight and renal         Diabetes Discharge Plan   Medication: PTA Metformin and Glipizide     Referral  []        Outpatient diabetes education   Additional orders            Initial Presentation   Narinder Gomez is a 81 y.o. female admitted on 3/4/23 after experiencing worsening cough, congestion, and shortness of breath. LAB:, A1c 10.0%, NT pro-, WBC 18.8  CXR:IMPRESSION  No Acute Disease. HX:   Past Medical History:   Diagnosis Date    Alzheimer's disease ([facility]     Hypercholesterolemia     Hyperglycemia due to type 2 diabetes mellitus ([facility] 08/04/2016    Major depression     S/P PICC central line placement 63/84/1266    Right basilic PICC placement for LT antbx    Transient ischemic attack         INITIAL DX:   Acute respiratory failure with hypoxia (HCC) [J96.01]     Current Treatment     TX: nebulizers, insulin, tessalon, statin, antidepressants, O2    Hospital Course   Clinical progress has been complicated by acute asthma exacerbation. Diabetes History   Patient with dementia and [place]. Granddaughter Henry Rick states she has had diabetes \"for a long time\". Patient's daughter, Imer Dewey, gave rest of history    Diabetes-related Medical History  Acute complications  Hyperglycemia  Macrovascular disease  Cerebral vascular accident  Other associated conditions     Depression    Diabetes Medication History  Key Antihyperglycemic Medications               metFORMIN (GLUCOPHAGE) 500 mg tablet (Taking) Take 500 mg by mouth two (2) times daily (with meals).              Diabetes self-management practices:   Eating pattern   Eats 2 large meals a day   Breakfast: waffles, eggs, [employer] brambila, grits, coffee   Lunch: Aj Avery takes a few bites\"   Dinner: chicken, rice, veggies   Late evening: small ice cream cone  Physical activity pattern   [x] Not likely employing a physical activity program to control BG  Monitoring pattern   [x] Testing BGs sufficiently to inform self-management adjustments   AM BGs: 140-160   PM BGs : 160-180  Taking medications pattern: Metformin and Glipizide - daughter states that she sometimes holds the PM medications if BGs less than 160  [x] Consistent administration  [x] Affordable  Reducing risks  [] Influenza: There is no immunization history on file for this patient. [] Pneumonia:   There is no immunization history on file for this patient. [] Hepatitis:   There is no immunization history on file for this patient. Social determinants of health impacting diabetes self-management practices   Struggling with anxiety and/or depression and Concerned that you need to know more about how to stay healthy with diabetes  Overall evaluation:    [x] Not achieving A1c target with drug therapy & self-care practices: A1c 10.0%    Subjective   I'm hot.      Objective   Physical exam  General Obese female in no acute distress.  Conversant and cooperative  Neuro  Sleepy  Vital Signs Visit Vitals  BP (!) 121/59 (BP 1 Location: Left upper arm, BP Patient Position: At rest)   Pulse 85   Temp 98.7 °F (37.1 °C)   Resp 18   Ht 5' 2\" (1.575 m)   Wt 81.2 kg (179 lb)   SpO2 94%   BMI 32.74 kg/m²     Extremities No foot wounds    Diabetic foot exam:    Left Foot     Visual Exam: normal    Pulse DP: 2+ (normal)  Right Foot   Visual Exam: normal    Pulse DP: 2+ (normal)  Laboratory  Recent Labs     03/06/23  0231 03/04/23  1909   * 348*   AGAP 4* 14   WBC 15.0* 18.8*   CREA 0.86 0.96   AST 12* 15   ALT 23 26       Factors impacting BG management  Factor Dose Comments   Nutrition:  Standard meals     60 grams/meal      Other:   Kidney function  Liver function   Normal  AST 12      Blood glucose pattern    Significant diabetes-related events over the past 24-72 hours  BG range 210-357     Assessment and Nursing Intervention   Nursing Diagnosis Risk for unstable blood glucose pattern   Nursing Intervention Domain 5256 Decision-making Support   Nursing Interventions Examined current inpatient diabetes/blood glucose control   Explored factors facilitating and impeding inpatient management  Explored corrective strategies with patient and responsible inpatient provider   Informed patient of rational for insulin strategy while hospitalized     Billing Code(s)   [x] 81150 IP subsequent hospital care - 50 minutes   [] 80978 IP subsequent hospital care - 35 minutes   [] 53674 IP subsequent hospital care - 25 minutes   [] [id number] IP initial hospital care - 40 minutes     Before making these care recommendations, I personally reviewed the hospitalization record, including notes, laboratory & diagnostic data and current medications, and examined the patient at the bedside (circumstances permitting) before determining care. More than fifty (50) percent of the time was spent in patient counseling and/or care coordination.   Total minutes: [address], CNS  Diabetes Clinical Nurse Specialist  [facility]  Access via [address] [place] [address]

## 2023-03-11 LAB
BACTERIA SPEC CULT: NORMAL
SERVICE CMNT-IMP: NORMAL

## 2023-04-22 DIAGNOSIS — M81.0 AGE-RELATED OSTEOPOROSIS WITHOUT CURRENT PATHOLOGICAL FRACTURE: Primary | ICD-10-CM

## 2023-04-22 DIAGNOSIS — Z12.31 ENCOUNTER FOR SCREENING MAMMOGRAM FOR MALIGNANT NEOPLASM OF BREAST: Primary | ICD-10-CM

## 2023-04-23 DIAGNOSIS — Z12.31 ENCOUNTER FOR SCREENING MAMMOGRAM FOR MALIGNANT NEOPLASM OF BREAST: Primary | ICD-10-CM

## (undated) DEVICE — COVER LT HNDL BLU PLAS

## (undated) DEVICE — ADAPTER MON OD15X22MM ID15MM STD LUER FIT W/ LIFESAVER

## (undated) DEVICE — SUPER TURBOVAC 90 INTEGRATED CABLE WAND ICW: Brand: COBLATION

## (undated) DEVICE — LIGHT HANDLE: Brand: DEVON

## (undated) DEVICE — DRAPE,REIN 53X77,STERILE: Brand: MEDLINE

## (undated) DEVICE — STERILE POLYISOPRENE POWDER-FREE SURGICAL GLOVES WITH EMOLLIENT COATING: Brand: PROTEXIS

## (undated) DEVICE — ARTHROSCOPY RICHMOND-LF: Brand: MEDLINE INDUSTRIES, INC.

## (undated) DEVICE — Z CONVERTED USE 2271148 CONNECTOR TBNG POLYPR 5IN1 TOUGH SHATTERPROOF FOR 5-11MM TB

## (undated) DEVICE — SOLUTION IRRIG 3000ML LAC R FLX CONT

## (undated) DEVICE — GAUZE SPONGES,12 PLY: Brand: CURITY

## (undated) DEVICE — DYONICS 25 INFLOW TUBE SET, 3 PER BOX

## (undated) DEVICE — STERILE POLYISOPRENE POWDER-FREE SURGICAL GLOVES: Brand: PROTEXIS

## (undated) DEVICE — (D)PREP SKN CHLRAPRP APPL 26ML -- CONVERT TO ITEM 371833

## (undated) DEVICE — SUTURE ETHLN SZ 4-0 L18IN NONABSORBABLE BLK L19MM PS-2 3/8 1667H

## (undated) DEVICE — GRASPER ENDOSCP L15CM 45DEG BLU LO PROF SHRP TIP SUT RETRV

## (undated) DEVICE — PACK,SHOULDER,DRAPE,POUCH: Brand: MEDLINE

## (undated) DEVICE — INFECTION CONTROL KIT SYS

## (undated) DEVICE — DEVON™ KNEE AND BODY STRAP 60" X 3" (1.5 M X 7.6 CM): Brand: DEVON

## (undated) DEVICE — 3M™ STERI-DRAPE™ U-DRAPE 1015: Brand: STERI-DRAPE™

## (undated) DEVICE — 4-PORT MANIFOLD: Brand: NEPTUNE 2

## (undated) DEVICE — ASPIRATOR FLR L72IN SUCT TB W/ 1 DETACH FISH STK PUSH HNDL

## (undated) DEVICE — DRAPE,U/ SHT,SPLIT,PLAS,STERIL: Brand: MEDLINE

## (undated) DEVICE — ABDOMINAL PAD: Brand: DERMACEA

## (undated) DEVICE — MEDI-VAC NON-CONDUCTIVE SUCTION TUBING: Brand: CARDINAL HEALTH

## (undated) DEVICE — CANNULA ARTHSCP L7CM ID8.25MM TRNSLUC THRD FLX W/ NO SQUIRT

## (undated) DEVICE — GOWN,BREATHABLE,IMP SLV 3XL AURORA: Brand: MEDLINE

## (undated) DEVICE — CHISEL VULCAN CURVED: Brand: CHISEL

## (undated) DEVICE — DUAL IRRIGATION ADAPTOR

## (undated) DEVICE — KIT SHLDR TRAC CUST